# Patient Record
Sex: MALE | Race: WHITE | NOT HISPANIC OR LATINO | Employment: FULL TIME | URBAN - METROPOLITAN AREA
[De-identification: names, ages, dates, MRNs, and addresses within clinical notes are randomized per-mention and may not be internally consistent; named-entity substitution may affect disease eponyms.]

---

## 2017-02-03 RX ORDER — CARVEDILOL 6.25 MG/1
TABLET ORAL
Qty: 180 TABLET | Refills: 0 | Status: SHIPPED | OUTPATIENT
Start: 2017-02-03 | End: 2017-07-25 | Stop reason: SDUPTHER

## 2017-04-05 ENCOUNTER — TELEPHONE (OUTPATIENT)
Dept: SLEEP MEDICINE | Facility: HOSPITAL | Age: 72
End: 2017-04-05

## 2017-04-05 NOTE — TELEPHONE ENCOUNTER
"Returned pt's call. He states he opened a new mask Sunday (same make/model he has been using with no trouble), and he had \"red welts\" on his face Monday morning. He described the marks as red lines that extend from the nose down around the bottom of the face. He uses a full face mask, but the marks do not appear on bridge of nose. He said the skin feels tough where the redness is. He states he has never had this problem before. He did not wash the mask before using it. He states that he went to [facility] about it yesterday, and they suggested he wash the mask in mild soapy water, which he has not yet done. I suggested it would be a idea to wash it, and wash any new mask as well. He agreed to do the same tonight, but he asked that we get a message to Dr. Call to see how he advises pt proceed or if he thinks pt needs to be seen.  "

## 2017-04-10 ENCOUNTER — TELEPHONE (OUTPATIENT)
Dept: SLEEP MEDICINE | Facility: HOSPITAL | Age: 72
End: 2017-04-10

## 2017-04-10 NOTE — TELEPHONE ENCOUNTER
Left message regarding note added to epic by tamiko on 4/5/17.    Dr. Call agrees with and tamiko about cleaning msk.  Pt to f/u  With dr. Call if needed.

## 2017-04-19 ENCOUNTER — HOSPITAL ENCOUNTER (OUTPATIENT)
Dept: SLEEP MEDICINE | Facility: HOSPITAL | Age: 72
Discharge: HOME | End: 2017-04-19
Admitting: INTERNAL MEDICINE

## 2017-04-19 PROCEDURE — G0463 HOSPITAL OUTPT CLINIC VISIT: HCPCS

## 2017-04-19 PROCEDURE — 99213 OFFICE O/P EST LOW 20 MIN: CPT | Performed by: INTERNAL MEDICINE

## 2017-04-29 NOTE — PROGRESS NOTES
[facility] Note April 19, 2017      Patient Care Team:  Patricia Ferrara MD as PCP - General  Patricia Ferrara MD as PCP - Family Medicine    Chief Complaint:  PLACIDO     Interval History:   The patient was last seen by me in November 2016.  He obtained a new mask.  It was the same type that he normally gets.  However, he has had a skin reaction.  His nasolabial folds are thickened and somewhat hard and slightly painful.  They are slightly red.  He uses a fullface mask.  He goes to bed 11:30 PM and awakens at 8 AM.  He wakes up once or twice to urinate.  [place] Sleepiness Scale borderline normal at 7.    Review of Systems:  Recorded on the Sleep Questionnaire.  Unremarkable except for nasal congestion and anxiety.    Social History:  He does not smoke cigarettes.  6 alcoholic drinks per week.  3 cups of coffee a day.    Allergies:  Reviewed.     Medication Review:  Reviewed.      Vital Signs:  Height 67 inches and weight 172 he is overweight with a body mass index of 27.    Physical Exam:    Constitutional:  Well developed white male and appears in no apparent distress.  Awake & oriented times 3.  Normal mood with normal recent and remote memory and normal judgement.  Eyes:  Conjunctivae normal.  His nasolabial folds are somewhat thickened and firm.  Slightly tender.  Oropharynx:  moist mucous membranes without exudate and a normal sized tongue and previous UPPP and mild narrowing of the posterior pharyngeal opening and class II-III MP airway.      Results Review:  DME is [facility] and he uses a fullface mask.  Downloads between October 21, 2016 and April 18, 2017 compliances 90%.  There is a time greater than 2 weeks in which he used his travel Z1 CPAP.  Average usage is 8 hours.  Average AHI is normal without leak and his CPAP check is 12.5.       Impression:   Obstructive sleep apnea adequately treated with CPAP with good compliance and usage and no complaints of hypersomnolence.  He has  had a mild mask reaction?      Plan:  Good sleep hygiene measures should be maintained.  Some weight loss would be beneficial in this patient who is overweight by BMI.    The patient will continue his CPAP.  We did provide p.m. AP 10 medium interface to try for one or 2 weeks.  Further recommendations will be made after he calls us back.      Kaden Call MD  04/29/17  1:26 PM

## 2017-05-23 ENCOUNTER — LAB (OUTPATIENT)
Dept: INTERNAL MEDICINE | Facility: CLINIC | Age: 72
End: 2017-05-23

## 2017-05-23 DIAGNOSIS — E78.00 PURE HYPERCHOLESTEROLEMIA: ICD-10-CM

## 2017-05-23 DIAGNOSIS — R97.20 ELEVATED PSA: ICD-10-CM

## 2017-05-23 LAB
ALBUMIN SERPL-MCNC: 4.3 G/DL (ref 3.5–5.2)
ALBUMIN/GLOB SERPL: 1.9 G/DL
ALP SERPL-CCNC: 52 U/L (ref 39–117)
ALT SERPL-CCNC: 22 U/L (ref 1–41)
AST SERPL-CCNC: 20 U/L (ref 1–40)
BILIRUB SERPL-MCNC: 0.5 MG/DL (ref 0.1–1.2)
BUN SERPL-MCNC: 14 MG/DL (ref 8–23)
BUN/CREAT SERPL: 15.2 (ref 7–25)
CALCIUM SERPL-MCNC: 9.6 MG/DL (ref 8.6–10.5)
CHLORIDE SERPL-SCNC: 105 MMOL/L (ref 98–107)
CHOLEST SERPL-MCNC: 149 MG/DL (ref 0–200)
CO2 SERPL-SCNC: 27.1 MMOL/L (ref 22–29)
CREAT SERPL-MCNC: 0.92 MG/DL (ref 0.76–1.27)
GLOBULIN SER CALC-MCNC: 2.3 GM/DL
GLUCOSE SERPL-MCNC: 106 MG/DL (ref 65–99)
HDLC SERPL-MCNC: 54 MG/DL (ref 40–60)
LDLC SERPL CALC-MCNC: 70 MG/DL (ref 0–100)
POTASSIUM SERPL-SCNC: 4.3 MMOL/L (ref 3.5–5.2)
PROT SERPL-MCNC: 6.6 G/DL (ref 6–8.5)
PSA SERPL-MCNC: 3.73 NG/ML (ref 0–4)
SODIUM SERPL-SCNC: 144 MMOL/L (ref 136–145)
TRIGL SERPL-MCNC: 125 MG/DL (ref 0–150)
VLDLC SERPL-MCNC: 25 MG/DL (ref 5–40)

## 2017-06-07 ENCOUNTER — OFFICE VISIT (OUTPATIENT)
Dept: INTERNAL MEDICINE | Facility: CLINIC | Age: 72
End: 2017-06-07

## 2017-06-07 ENCOUNTER — TELEPHONE (OUTPATIENT)
Dept: INTERNAL MEDICINE | Facility: CLINIC | Age: 72
End: 2017-06-07

## 2017-06-07 VITALS
HEART RATE: 72 BPM | HEIGHT: 68 IN | BODY MASS INDEX: 26.95 KG/M2 | DIASTOLIC BLOOD PRESSURE: 60 MMHG | WEIGHT: 177.8 LBS | SYSTOLIC BLOOD PRESSURE: 122 MMHG

## 2017-06-07 DIAGNOSIS — E78.00 PURE HYPERCHOLESTEROLEMIA: Primary | ICD-10-CM

## 2017-06-07 DIAGNOSIS — M79.89 MASS OF SOFT TISSUE OF RIGHT UPPER EXTREMITY: ICD-10-CM

## 2017-06-07 DIAGNOSIS — R73.01 IMPAIRED FASTING BLOOD SUGAR: ICD-10-CM

## 2017-06-07 DIAGNOSIS — Z00.00 HEALTH CARE MAINTENANCE: ICD-10-CM

## 2017-06-07 DIAGNOSIS — R97.20 ELEVATED PSA: ICD-10-CM

## 2017-06-07 DIAGNOSIS — I10 BENIGN ESSENTIAL HYPERTENSION: ICD-10-CM

## 2017-06-07 PROCEDURE — 99214 OFFICE O/P EST MOD 30 MIN: CPT | Performed by: INTERNAL MEDICINE

## 2017-06-07 RX ORDER — ROSUVASTATIN CALCIUM 40 MG/1
40 TABLET, COATED ORAL DAILY
Qty: 90 TABLET | Refills: 3 | Status: SHIPPED | OUTPATIENT
Start: 2017-06-07 | End: 2018-07-06 | Stop reason: SDUPTHER

## 2017-06-07 NOTE — TELEPHONE ENCOUNTER
----- Message from Katlin Tejada MA sent at 6/7/2017 10:25 AM EDT -----  Pt calling to inform you that has has been taking Carvedilol BID but MAR says QD.  Pt wants to know if he should change and take only once daily    pls advise        Pt#[phone]

## 2017-06-07 NOTE — PATIENT INSTRUCTIONS
"HTN - well controlled with current medication regimen. Normal kidney tests and electrolytes. Recommended low salt diet. Continue same medical treatment.  Hyperlipidemia - well controlled with statin. Normal liver function tests. LDL target is below < 70 mg/dl (\"very high\" risk for CHD). Patient's 71. Continue same treatment. Regular exercise recommended.  Elevated PSA - improved. Will monitor.  Impaired fasting blood sugar - still elevated at 106. Needs to continue low starch, low carb diet and regular exercise.  Callus-like soft tissue tumor on the right thumb - patient had it excised twice, but had reoccured every time. Now it is to the point that it effects his bowling and recreation. Will refer for 2nd opinion to .    "

## 2017-06-07 NOTE — PROGRESS NOTES
"Darren Zheng is a 71 y.o. male. Here for HTN, hyperlipidemia and elevated PSA f/u.    History of Present Illness     /60  Pulse 72  Ht 68\" (172.7 cm)  Wt 177 lb 12.8 oz (80.6 kg)  BMI 27.03 kg/m2    Current Outpatient Prescriptions:   •  aspirin (ASPIR) 81 MG EC tablet, Take  by mouth., Disp: , Rfl:   •  carvedilol (COREG) 6.25 MG tablet, TAKE 1 TABLET BY MOUTH EVERY DAY, Disp: 180 tablet, Rfl: 0  •  carvedilol (COREG) 6.25 MG tablet, TAKE 1 TABLET BY MOUTH EVERY DAY, Disp: 180 tablet, Rfl: 0  •  Cetirizine HCl (ZYRTEC ALLERGY) 10 MG capsule, Take  by mouth., Disp: , Rfl:   •  Cholecalciferol (VITAMIN D3) 1000 UNIT/SPRAY liquid, Take  by mouth., Disp: , Rfl:   •  Cyanocobalamin (VITAMIN B 12) 100 MCG lozenge, Take  by mouth., Disp: , Rfl:   •  FLUZONE HIGH-DOSE 0.5 ML suspension prefilled syringe injection, , Disp: , Rfl:   •  Multiple Vitamin (MULTI VITAMIN DAILY) tablet, Take  by mouth., Disp: , Rfl:   •  Omega-3 Fatty Acids (FISH OIL DOUBLE STRENGTH) 1200 MG capsule, Take  by mouth., Disp: , Rfl:   •  rosuvastatin (CRESTOR) 40 MG tablet, TAKE 1 TABLET BY MOUTH DAILY, Disp: 90 tablet, Rfl: 0    Patient has long-standing benign essential HTN.  BP is usually well controlled with daily use of b-blocker. On low salt diet with good compliance. Takes medication regularly. Denies chest pain, dyspnea,lightheadedness,  lower extremity edema. Patient does not check blood pressure    Patient has been diagnosed with hyperlipidemia. Target LDL is < 70 mg/dl (\"very high\" risk for CHD). Patient is on low fat diet with good compliance. Patient is compliant with treatment: daily use of Crestor (rozuvastatin). Side effects of medication: none. Exercise 5-6 days a week and walking.    Patient had been diagnosed with elevated PSA. No FH of prostate cancer.     Patient c/o soft tissue growth on the PIP joint of the right thumb, that had appeared some time ago. He had excision by  twice, and every " time came back. No pain, but had affected his bowling. The area is a subject to the pressure while bowling, that he does once a week. Patient states that he has discomfort an desired second opinion.    The following portions of the patient's history were reviewed and updated as appropriate: allergies, current medications, past family history, past medical history, past social history, past surgical history and problem list.    Review of Systems   Constitutional: Negative.    HENT: Negative.    Eyes: Negative.    Respiratory: Negative.    Cardiovascular: Negative.    Gastrointestinal: Negative.    Endocrine: Negative.    Genitourinary: Negative.    Musculoskeletal: Negative.    Skin: Negative.    Allergic/Immunologic: Negative.    Neurological: Negative.    Hematological: Negative.    Psychiatric/Behavioral: Negative.        Objective   Physical Exam   Constitutional: He is oriented to person, place, and time. He appears well-developed and well-nourished.   HENT:   Head: Normocephalic and atraumatic.   Right Ear: Tympanic membrane, external ear and ear canal normal.   Left Ear: Tympanic membrane, external ear and ear canal normal.   Nose: Nose normal. Right sinus exhibits no maxillary sinus tenderness and no frontal sinus tenderness. Left sinus exhibits no maxillary sinus tenderness and no frontal sinus tenderness.   Mouth/Throat: Uvula is midline, oropharynx is clear and moist and mucous membranes are normal.   Eyes: Conjunctivae and EOM are normal. Pupils are equal, round, and reactive to light. Right eye exhibits no discharge. Left eye exhibits no discharge. No scleral icterus.   Neck: Neck supple. No JVD present.   Cardiovascular: Normal rate, regular rhythm and normal heart sounds.  Exam reveals no gallop and no friction rub.    No murmur heard.  Pulmonary/Chest: Effort normal and breath sounds normal. He has no wheezes. He has no rales.   Musculoskeletal: He exhibits deformity (callus like soft tissue mass over  "the PIP right thumb). He exhibits no edema.   Lymphadenopathy:     He has no cervical adenopathy.   Neurological: He is alert and oriented to person, place, and time. No cranial nerve deficit.   Skin: Skin is warm and dry. No rash noted.   Psychiatric: He has a normal mood and affect. His behavior is normal.   Vitals reviewed.      Assessment/Plan   Chuck was seen today for follow-up, hypertension and hyperlipidemia.    Diagnoses and all orders for this visit:    Pure hypercholesterolemia    Elevated PSA    Benign essential hypertension    Impaired fasting blood sugar    Mass of soft tissue of right upper extremity  -     Ambulatory Referral to Hand Surgery    [facility] maintenance  -     CBC & Differential; Future  -     Comprehensive Metabolic Panel; Future  -     Lipid Panel; Future  -     PSA; Future  -     TSH; Future  -     Urinalysis With / Microscopic If Indicated; Future      HTN - well controlled with current medication regimen. Normal kidney tests and electrolytes. Recommended low salt diet. Continue same medical treatment.  Hyperlipidemia - well controlled with statin. Normal liver function tests. LDL target is below < 70 mg/dl (\"very high\" risk for CHD). Patient's 71. Continue same treatment. Regular exercise recommended.  Elevated PSA - improved. Will monitor.  Impaired fasting blood sugar - still elevated at 106. Needs to continue low starch, low carb diet and regular exercise.  Callus-like soft tissue tumor on the right thumb - patient had it excised twice, but had reoccured every time. Now it is to the point that it effects his bowling and recreation. Will refer for 2nd opinion to .     "

## 2017-06-19 ENCOUNTER — TELEPHONE (OUTPATIENT)
Dept: INTERNAL MEDICINE | Facility: CLINIC | Age: 72
End: 2017-06-19

## 2017-06-19 RX ORDER — AZITHROMYCIN 250 MG/1
TABLET, FILM COATED ORAL
Qty: 6 TABLET | Refills: 0 | Status: SHIPPED | OUTPATIENT
Start: 2017-06-19 | End: 2017-07-03

## 2017-06-19 NOTE — TELEPHONE ENCOUNTER
"----- Message from Leeanne AMY Bower sent at 6/19/2017 10:09 AM EDT -----  Contact: Patient  Patient called with complaints of sinus infection x1 month.  States he does a nasal irrigation every morning with discolored \"chunks\" coming from nose.  Feels warm but does not believe he is running a fever.  Occasional nonproductive cough.  Would like a Z-Romie please.  Please advise.     Patient:  [phone]    Pharmacy:  [facility] [address] [place] [address] [address] AT [facility] - [phone]  - [phone]  "

## 2017-06-21 ENCOUNTER — TRANSCRIBE ORDERS (OUTPATIENT)
Dept: ADMINISTRATIVE | Facility: HOSPITAL | Age: 72
End: 2017-06-21

## 2017-06-21 ENCOUNTER — HOSPITAL ENCOUNTER (OUTPATIENT)
Dept: CARDIOLOGY | Facility: HOSPITAL | Age: 72
Discharge: HOME | End: 2017-06-21
Attending: PLASTIC SURGERY | Admitting: PLASTIC SURGERY

## 2017-06-21 DIAGNOSIS — Z01.818 PRE-OP TESTING: Primary | ICD-10-CM

## 2017-06-21 DIAGNOSIS — M67.449 GANGLION OF HAND, UNSPECIFIED LATERALITY: ICD-10-CM

## 2017-06-21 PROCEDURE — 93010 ELECTROCARDIOGRAM REPORT: CPT | Performed by: INTERNAL MEDICINE

## 2017-06-21 PROCEDURE — 93005 ELECTROCARDIOGRAM TRACING: CPT

## 2017-07-03 ENCOUNTER — OFFICE VISIT (OUTPATIENT)
Dept: INTERNAL MEDICINE | Facility: CLINIC | Age: 72
End: 2017-07-03

## 2017-07-03 VITALS
SYSTOLIC BLOOD PRESSURE: 104 MMHG | DIASTOLIC BLOOD PRESSURE: 60 MMHG | HEIGHT: 68 IN | WEIGHT: 178 LBS | BODY MASS INDEX: 26.98 KG/M2

## 2017-07-03 DIAGNOSIS — R53.83 FATIGUE, UNSPECIFIED TYPE: ICD-10-CM

## 2017-07-03 DIAGNOSIS — R73.01 IMPAIRED FASTING BLOOD SUGAR: Primary | ICD-10-CM

## 2017-07-03 PROBLEM — M79.89 MASS OF SOFT TISSUE OF RIGHT UPPER EXTREMITY: Status: RESOLVED | Noted: 2017-06-07 | Resolved: 2017-07-03

## 2017-07-03 LAB
ALBUMIN SERPL-MCNC: 4.5 G/DL (ref 3.5–5.2)
ALBUMIN/GLOB SERPL: 2 G/DL
ALP SERPL-CCNC: 59 U/L (ref 39–117)
ALT SERPL-CCNC: 22 U/L (ref 1–41)
AST SERPL-CCNC: 24 U/L (ref 1–40)
BILIRUB SERPL-MCNC: 0.5 MG/DL (ref 0.1–1.2)
BUN SERPL-MCNC: 13 MG/DL (ref 8–23)
BUN/CREAT SERPL: 12.3 (ref 7–25)
CALCIUM SERPL-MCNC: 9.3 MG/DL (ref 8.6–10.5)
CHLORIDE SERPL-SCNC: 104 MMOL/L (ref 98–107)
CO2 SERPL-SCNC: 27.2 MMOL/L (ref 22–29)
CREAT SERPL-MCNC: 1.06 MG/DL (ref 0.76–1.27)
ERYTHROCYTE [SEDIMENTATION RATE] IN BLOOD BY WESTERGREN METHOD: 4 MM/HR (ref 0–20)
GLOBULIN SER CALC-MCNC: 2.2 GM/DL
GLUCOSE SERPL-MCNC: 76 MG/DL (ref 65–99)
POTASSIUM SERPL-SCNC: 4.5 MMOL/L (ref 3.5–5.2)
PROT SERPL-MCNC: 6.7 G/DL (ref 6–8.5)
SODIUM SERPL-SCNC: 145 MMOL/L (ref 136–145)

## 2017-07-03 PROCEDURE — 99213 OFFICE O/P EST LOW 20 MIN: CPT | Performed by: INTERNAL MEDICINE

## 2017-07-03 NOTE — PATIENT INSTRUCTIONS
Fatigue, unusual degree - at least 3 of the medications could contribute: Zyrtec, that he takes in am, Coreg and Crestor.BP seems also run on a low side. Will ask patient to stop Coreg and Crestor. Change Zyrtec to Allegra or Claritin. In 190 days restart Coreg, and in a week or ao will reevaluate, if no change, will restart Crestor. Will check labs as per orders. If all negative - possibly due to increased load at work and excessive burn-out at work.

## 2017-07-03 NOTE — PROGRESS NOTES
"Subjective   Chuck Zheng is a 71 y.o. male. Here c/o not feeling well and being fatigued    History of Present Illness   /60  Ht 68\" (172.7 cm)  Wt 178 lb (80.7 kg)  BMI 27.06 kg/m2  Chuck Zheng71 y.o.male presents to the office c/o being very tired and fatigued, stated going to bed earlier, believes that it had affected his concentration at work and in social situations, like ability to stay in the conversation. Continues to play bridge and to win, but feel tiered all the time.  S-ms started few months ago. Precipitating event: possible inc reased work load , as he had to  somebody's load, as one of the coworkers is on extended leave. Patient describes just overwhelming fatigue. Intensity of the s-ms: severe. Patient denies  fevers, chills, chest pressure or pains. Had few episodes of heart racing, that lasted for few seconds only and resolved spontaneously. Denies dysponea. States that the fatigue had been constant. Lost 2 lbs since 11/2016. Has a cat at home, takes care of the litter box.  Patient  has had no similar episode in a past.   Treatments tried:none  PMH is significant for CAD and PLACIDO. Takes Zyrtec in am (usully he rotates antihistamines). Takes Coreg and Crestor.      Current Outpatient Prescriptions:   •  aspirin (ASPIR) 81 MG EC tablet, Take  by mouth., Disp: , Rfl:   •  carvedilol (COREG) 6.25 MG tablet, TAKE 1 TABLET BY MOUTH EVERY DAY, Disp: 180 tablet, Rfl: 0  •  Cetirizine HCl (ZYRTEC ALLERGY) 10 MG capsule, Take  by mouth., Disp: , Rfl:   •  Cholecalciferol (VITAMIN D3) 1000 UNIT/SPRAY liquid, Take  by mouth., Disp: , Rfl:   •  Cyanocobalamin (VITAMIN B 12) 100 MCG lozenge, Take  by mouth., Disp: , Rfl:   •  FLUZONE HIGH-DOSE 0.5 ML suspension prefilled syringe injection, , Disp: , Rfl:   •  Multiple Vitamin (MULTI VITAMIN DAILY) tablet, Take  by mouth., Disp: , Rfl:   •  Omega-3 Fatty Acids (FISH OIL DOUBLE STRENGTH) 1200 MG capsule, Take  by mouth., Disp: , Rfl: "   •  rosuvastatin (CRESTOR) 40 MG tablet, Take 1 tablet by mouth Daily., Disp: 90 tablet, Rfl: 3    The following portions of the patient's history were reviewed and updated as appropriate: allergies, current medications, past family history, past medical history, past social history, past surgical history and problem list.    Review of Systems   Constitutional: Positive for fatigue (becoming more and more tired. ). Negative for activity change, appetite change, chills, fever and unexpected weight change.   HENT: Negative for congestion, postnasal drip and sinus pressure.    Eyes: Negative for pain and itching.   Respiratory: Negative for cough and chest tightness.    Cardiovascular: Positive for palpitations (had few episodes that lasted 20-30 sec). Negative for chest pain and leg swelling.   Gastrointestinal: Negative for abdominal pain, anal bleeding, blood in stool and nausea.   Endocrine: Negative.    Genitourinary: Negative.    Musculoskeletal: Negative for arthralgias and myalgias.   Neurological: Negative for dizziness, light-headedness, numbness and headaches.   Psychiatric/Behavioral: Negative.        Objective   Physical Exam   Constitutional: He is oriented to person, place, and time. He appears well-developed and well-nourished.   HENT:   Head: Normocephalic and atraumatic.   Right Ear: Tympanic membrane, external ear and ear canal normal.   Left Ear: Tympanic membrane, external ear and ear canal normal.   Nose: Nose normal. Right sinus exhibits no maxillary sinus tenderness and no frontal sinus tenderness. Left sinus exhibits no maxillary sinus tenderness and no frontal sinus tenderness.   Mouth/Throat: Uvula is midline, oropharynx is clear and moist and mucous membranes are normal.   Eyes: Conjunctivae and EOM are normal. Pupils are equal, round, and reactive to light. Right eye exhibits no discharge. Left eye exhibits no discharge. No scleral icterus.   Neck: Neck supple. No JVD present.    Cardiovascular: Normal rate, regular rhythm and normal heart sounds.  Exam reveals no gallop and no friction rub.    No murmur heard.  Pulmonary/Chest: Effort normal and breath sounds normal. He has no wheezes. He has no rales.   Musculoskeletal: He exhibits no edema.   Lymphadenopathy:     He has no cervical adenopathy.   Neurological: He is alert and oriented to person, place, and time. No cranial nerve deficit.   Skin: Skin is warm and dry. No rash noted.   Psychiatric: He has a normal mood and affect. His behavior is normal.   Vitals reviewed.      Assessment/Plan   Diagnoses and all orders for this visit:    Impaired fasting blood sugar  -     Hemoglobin A1c; Future    Fatigue, unspecified type  -     Toxoplasma Antibodies IgG / IgM; Future  -     CBC & Differential; Future  -     Comprehensive Metabolic Panel; Future  -     Sedimentation Rate; Future      Fatigue, unusual degree - at least 3 of the medications could contribute: Zyrtec, that he takes in am, Coreg and Crestor.BP seems also run on a low side. Will ask patient to stop Coreg and Crestor. Change Zyrtec to Allegra or Claritin. In 190 days restart Coreg, and in a week or ao will reevaluate, if no change, will restart Crestor. Will check labs as per orders. If all negative - possibly due to increased load at work and excessive burn-out at work.

## 2017-07-04 LAB
BASOPHILS # BLD AUTO: 0.03 10*3/MM3 (ref 0–0.2)
BASOPHILS NFR BLD AUTO: 0.6 % (ref 0–1.5)
EOSINOPHIL # BLD AUTO: 0.07 10*3/MM3 (ref 0–0.7)
EOSINOPHIL # BLD AUTO: 1.5 % (ref 0.3–6.2)
ERYTHROCYTE [DISTWIDTH] IN BLOOD BY AUTOMATED COUNT: 13.8 % (ref 11.5–14.5)
HBA1C MFR BLD: 5.54 % (ref 4.8–5.6)
HCT VFR BLD AUTO: 41.6 % (ref 40.4–52.2)
HGB BLD-MCNC: 13.8 G/DL (ref 13.7–17.6)
IMM GRANULOCYTES # BLD: 0 10*3/MM3 (ref 0–0.03)
IMM GRANULOCYTES NFR BLD: 0 % (ref 0–0.5)
LABORATORY COMMENT REPORT: NORMAL
LYMPHOCYTES # BLD AUTO: 1.48 10*3/MM3 (ref 0.9–4.8)
LYMPHOCYTES NFR BLD AUTO: 31.7 % (ref 19.6–45.3)
MCH RBC QN AUTO: 28.8 PG (ref 27–32.7)
MCHC RBC AUTO-ENTMCNC: 33.2 G/DL (ref 32.6–36.4)
MCV RBC AUTO: 86.7 FL (ref 79.8–96.2)
MONOCYTES # BLD AUTO: 0.33 10*3/MM3 (ref 0.2–1.2)
MONOCYTES NFR BLD AUTO: 7.1 % (ref 5–12)
NEUTROPHILS # BLD AUTO: 2.76 10*3/MM3 (ref 1.9–8.1)
NEUTROPHILS NFR BLD AUTO: 59.1 % (ref 42.7–76)
PLATELET # BLD AUTO: 193 10*3/MM3 (ref 140–500)
RBC # BLD AUTO: 4.8 10*6/MM3 (ref 4.6–6)
T GONDII IGG SERPL IA-ACNC: <3 IU/ML (ref 0–7.1)
T GONDII IGM SER IA-ACNC: <3 AU/ML (ref 0–7.9)
WBC # BLD AUTO: 4.67 10*3/MM3 (ref 4.5–10.7)

## 2017-07-25 ENCOUNTER — OFFICE VISIT (OUTPATIENT)
Dept: INTERNAL MEDICINE | Facility: CLINIC | Age: 72
End: 2017-07-25

## 2017-07-25 VITALS
SYSTOLIC BLOOD PRESSURE: 114 MMHG | WEIGHT: 177 LBS | HEIGHT: 68 IN | RESPIRATION RATE: 14 BRPM | BODY MASS INDEX: 26.83 KG/M2 | DIASTOLIC BLOOD PRESSURE: 60 MMHG

## 2017-07-25 DIAGNOSIS — I25.10 ATHEROSCLEROSIS OF NATIVE CORONARY ARTERY OF NATIVE HEART WITHOUT ANGINA PECTORIS: Primary | ICD-10-CM

## 2017-07-25 PROCEDURE — 99213 OFFICE O/P EST LOW 20 MIN: CPT | Performed by: INTERNAL MEDICINE

## 2017-07-25 RX ORDER — CARVEDILOL 3.12 MG/1
1.5 TABLET ORAL 2 TIMES DAILY WITH MEALS
Qty: 30 TABLET | Refills: 11 | Status: SHIPPED | OUTPATIENT
Start: 2017-07-25 | End: 2017-12-12 | Stop reason: SDUPTHER

## 2017-07-25 NOTE — PATIENT INSTRUCTIONS
Fatigue - due to b-blocker. I had explained to the patient why he had been started on Carvedilol and what are the benefits. Will try to use very low dose: change to 1.56 mg twice a day. If not able to tolerate, call us back. If doing well, restart Crestor in 2 weeks.  CAD - restart lower dose of Carvedilol as above. If not able to tolerate, might consider use of Zebeta.

## 2017-07-25 NOTE — PROGRESS NOTES
Subjective   Chuck Zheng is a 71 y.o. male.     History of Present Illness   Chuck Zheng 71 y.o. male presents today for fatigue f/u. Last was seen on 07/03/2017 and on that visit medication was changed due to side effect  - fatigue.We had discontinued Carvedilol and Crestor..  Patient is compliant with treatment. Patient reports resolution of the symptoms. Fatigue is definitely related to the use of Carvedilol, had resolved immediately as he had stopped medication, and restarted as he is back on Carvedilol.Patient had been on Carvedilol for CAD - had stent to RCA. Denies chest pains, dyspnea.    The following portions of the patient's history were reviewed and updated as appropriate: allergies, current medications, past family history, past medical history, past social history, past surgical history and problem list.    Review of Systems   Constitutional: Negative for chills and fever.   Eyes: Negative for pain and redness.   Respiratory: Negative for cough and shortness of breath.    Cardiovascular: Negative for chest pain and leg swelling.   Neurological: Negative for dizziness and headaches.       Objective   Physical Exam   Constitutional: He is oriented to person, place, and time. He appears well-developed and well-nourished.   HENT:   Head: Normocephalic and atraumatic.   Right Ear: Tympanic membrane, external ear and ear canal normal.   Left Ear: Tympanic membrane, external ear and ear canal normal.   Nose: Nose normal. Right sinus exhibits no maxillary sinus tenderness and no frontal sinus tenderness. Left sinus exhibits no maxillary sinus tenderness and no frontal sinus tenderness.   Mouth/Throat: Uvula is midline, oropharynx is clear and moist and mucous membranes are normal.   Eyes: Conjunctivae and EOM are normal. Pupils are equal, round, and reactive to light. Right eye exhibits no discharge. Left eye exhibits no discharge. No scleral icterus.   Neck: Neck supple. No JVD present.    Cardiovascular: Normal rate, regular rhythm and normal heart sounds.  Exam reveals no gallop and no friction rub.    No murmur heard.  Pulmonary/Chest: Effort normal and breath sounds normal. He has no wheezes. He has no rales.   Musculoskeletal: He exhibits no edema.   Lymphadenopathy:     He has no cervical adenopathy.   Neurological: He is alert and oriented to person, place, and time. No cranial nerve deficit.   Skin: Skin is warm and dry. No rash noted.   Psychiatric: He has a normal mood and affect. His behavior is normal.   Vitals reviewed.      Assessment/Plan   Chuck was seen today for fatigue.    Diagnoses and all orders for this visit:    Atherosclerosis of native coronary artery of native heart without angina pectoris  -     carvedilol (COREG) 3.125 MG tablet; Take 0.5 tablets by mouth 2 (Two) Times a Day With Meals.      Fatigue - due to b-blocker. I had explained to the patient why he had been started on Carvedilol and what are the benefits. Will try to use very low dose: change to 1.56 mg twice a day. If not able to tolerate, call us back. If doing well, restart Crestor in 2 weeks.  CAD - restart lower dose of Carvedilol as above. If not able to tolerate, might consider use of Zebeta.

## 2017-08-01 ENCOUNTER — LAB REQUISITION (OUTPATIENT)
Dept: LAB | Facility: HOSPITAL | Age: 72
End: 2017-08-01

## 2017-08-01 DIAGNOSIS — M67.449 GANGLION OF HAND: ICD-10-CM

## 2017-08-01 PROCEDURE — 88305 TISSUE EXAM BY PATHOLOGIST: CPT | Performed by: PLASTIC SURGERY

## 2017-08-02 LAB
CYTO UR: NORMAL
LAB AP CASE REPORT: NORMAL
LAB AP CLINICAL INFORMATION: NORMAL
Lab: NORMAL
PATH REPORT.FINAL DX SPEC: NORMAL
PATH REPORT.GROSS SPEC: NORMAL

## 2017-09-11 ENCOUNTER — HOSPITAL ENCOUNTER (EMERGENCY)
Facility: HOSPITAL | Age: 72
Discharge: HOME | End: 2017-09-11
Attending: EMERGENCY MEDICINE | Admitting: EMERGENCY MEDICINE

## 2017-09-11 ENCOUNTER — APPOINTMENT (OUTPATIENT)
Dept: GENERAL RADIOLOGY | Facility: HOSPITAL | Age: 72
End: 2017-09-11

## 2017-09-11 VITALS
BODY MASS INDEX: 26.52 KG/M2 | SYSTOLIC BLOOD PRESSURE: 179 MMHG | DIASTOLIC BLOOD PRESSURE: 87 MMHG | TEMPERATURE: 97.8 F | HEIGHT: 68 IN | RESPIRATION RATE: 18 BRPM | WEIGHT: 175 LBS | HEART RATE: 70 BPM | OXYGEN SATURATION: 98 %

## 2017-09-11 DIAGNOSIS — R42 EPISODIC LIGHTHEADEDNESS: Primary | ICD-10-CM

## 2017-09-11 LAB
ALBUMIN SERPL-MCNC: 4.3 G/DL (ref 3.5–5.2)
ALBUMIN/GLOB SERPL: 1.5 G/DL
ALP SERPL-CCNC: 58 U/L (ref 39–117)
ALT SERPL W P-5'-P-CCNC: 24 U/L (ref 1–41)
ANION GAP SERPL CALCULATED.3IONS-SCNC: 9.6 MMOL/L
AST SERPL-CCNC: 19 U/L (ref 1–40)
BASOPHILS # BLD AUTO: 0.02 10*3/MM3 (ref 0–0.2)
BASOPHILS NFR BLD AUTO: 0.3 % (ref 0–1.5)
BILIRUB SERPL-MCNC: 0.3 MG/DL (ref 0.1–1.2)
BILIRUB UR QL STRIP: NEGATIVE
BUN BLD-MCNC: 15 MG/DL (ref 8–23)
BUN/CREAT SERPL: 15.2 (ref 7–25)
CALCIUM SPEC-SCNC: 9.3 MG/DL (ref 8.6–10.5)
CHLORIDE SERPL-SCNC: 102 MMOL/L (ref 98–107)
CLARITY UR: CLEAR
CO2 SERPL-SCNC: 28.4 MMOL/L (ref 22–29)
COLOR UR: YELLOW
CREAT BLD-MCNC: 0.99 MG/DL (ref 0.76–1.27)
DEPRECATED RDW RBC AUTO: 46.8 FL (ref 37–54)
EOSINOPHIL # BLD AUTO: 0.08 10*3/MM3 (ref 0–0.7)
EOSINOPHIL NFR BLD AUTO: 1.1 % (ref 0.3–6.2)
ERYTHROCYTE [DISTWIDTH] IN BLOOD BY AUTOMATED COUNT: 14.3 % (ref 11.5–14.5)
GFR SERPL CREATININE-BSD FRML MDRD: 75 ML/MIN/1.73
GLOBULIN UR ELPH-MCNC: 2.9 GM/DL
GLUCOSE BLD-MCNC: 106 MG/DL (ref 65–99)
GLUCOSE UR STRIP-MCNC: NEGATIVE MG/DL
HCT VFR BLD AUTO: 41.7 % (ref 40.4–52.2)
HGB BLD-MCNC: 13.4 G/DL (ref 13.7–17.6)
HGB UR QL STRIP.AUTO: NEGATIVE
HOLD SPECIMEN: NORMAL
HOLD SPECIMEN: NORMAL
IMM GRANULOCYTES # BLD: 0.02 10*3/MM3 (ref 0–0.03)
IMM GRANULOCYTES NFR BLD: 0.3 % (ref 0–0.5)
KETONES UR QL STRIP: NEGATIVE
LEUKOCYTE ESTERASE UR QL STRIP.AUTO: NEGATIVE
LYMPHOCYTES # BLD AUTO: 1.18 10*3/MM3 (ref 0.9–4.8)
LYMPHOCYTES NFR BLD AUTO: 15.7 % (ref 19.6–45.3)
MAGNESIUM SERPL-MCNC: 2.4 MG/DL (ref 1.6–2.4)
MCH RBC QN AUTO: 28.9 PG (ref 27–32.7)
MCHC RBC AUTO-ENTMCNC: 32.1 G/DL (ref 32.6–36.4)
MCV RBC AUTO: 89.9 FL (ref 79.8–96.2)
MONOCYTES # BLD AUTO: 0.48 10*3/MM3 (ref 0.2–1.2)
MONOCYTES NFR BLD AUTO: 6.4 % (ref 5–12)
NEUTROPHILS # BLD AUTO: 5.74 10*3/MM3 (ref 1.9–8.1)
NEUTROPHILS NFR BLD AUTO: 76.2 % (ref 42.7–76)
NITRITE UR QL STRIP: NEGATIVE
PH UR STRIP.AUTO: 5.5 [PH] (ref 5–8)
PLATELET # BLD AUTO: 217 10*3/MM3 (ref 140–500)
PMV BLD AUTO: 10.2 FL (ref 6–12)
POTASSIUM BLD-SCNC: 4.1 MMOL/L (ref 3.5–5.2)
PROT SERPL-MCNC: 7.2 G/DL (ref 6–8.5)
PROT UR QL STRIP: NEGATIVE
RBC # BLD AUTO: 4.64 10*6/MM3 (ref 4.6–6)
SODIUM BLD-SCNC: 140 MMOL/L (ref 136–145)
SP GR UR STRIP: 1.01 (ref 1–1.03)
TROPONIN T SERPL-MCNC: <0.01 NG/ML (ref 0–0.03)
UROBILINOGEN UR QL STRIP: NORMAL
WBC NRBC COR # BLD: 7.52 10*3/MM3 (ref 4.5–10.7)
WHOLE BLOOD HOLD SPECIMEN: NORMAL
WHOLE BLOOD HOLD SPECIMEN: NORMAL

## 2017-09-11 PROCEDURE — 85025 COMPLETE CBC W/AUTO DIFF WBC: CPT | Performed by: EMERGENCY MEDICINE

## 2017-09-11 PROCEDURE — 93005 ELECTROCARDIOGRAM TRACING: CPT | Performed by: EMERGENCY MEDICINE

## 2017-09-11 PROCEDURE — 83735 ASSAY OF MAGNESIUM: CPT | Performed by: EMERGENCY MEDICINE

## 2017-09-11 PROCEDURE — 84484 ASSAY OF TROPONIN QUANT: CPT | Performed by: EMERGENCY MEDICINE

## 2017-09-11 PROCEDURE — 81003 URINALYSIS AUTO W/O SCOPE: CPT | Performed by: EMERGENCY MEDICINE

## 2017-09-11 PROCEDURE — 80053 COMPREHEN METABOLIC PANEL: CPT | Performed by: EMERGENCY MEDICINE

## 2017-09-11 PROCEDURE — 93010 ELECTROCARDIOGRAM REPORT: CPT | Performed by: INTERNAL MEDICINE

## 2017-09-11 PROCEDURE — 71020 HC CHEST PA AND LATERAL: CPT

## 2017-09-11 PROCEDURE — 36415 COLL VENOUS BLD VENIPUNCTURE: CPT | Performed by: EMERGENCY MEDICINE

## 2017-09-11 PROCEDURE — 99284 EMERGENCY DEPT VISIT MOD MDM: CPT

## 2017-09-11 RX ORDER — SODIUM CHLORIDE 0.9 % (FLUSH) 0.9 %
10 SYRINGE (ML) INJECTION AS NEEDED
Status: DISCONTINUED | OUTPATIENT
Start: 2017-09-11 | End: 2017-09-11 | Stop reason: HOSPADM

## 2017-09-12 NOTE — ED PROVIDER NOTES
" EMERGENCY DEPARTMENT ENCOUNTER    CHIEF COMPLAINT  Chief Complaint: dizziness  History given by: pt  History limited by: nothing  Room Number: 17/17  PMD: Maura Ferrara MD      HPI:  Pt is a 71 y.o. male who presents complaining of episodic dizziness that first began when walking out of work 6 hours ago. The first episode lasted or one minute. He reports that the dizziness is more light-headed than spinning. Pt admits to nausea and fatigue but denies headache, diaphoresis, palpitations, and chest pain.    Duration:  6 hours  Onset: sudden  Timing: episodic  Location: head  Radiation: none  Quality: \"dizzy\"  Intensity/Severity: moderate  Progression: resolved  Associated Symptoms: nausea, fatigue  Aggravating Factors: unknown  Alleviating Factors: unknown  Previous Episodes: no  Treatment before arrival: unknown    PAST MEDICAL HISTORY  Active Ambulatory Problems     Diagnosis Date Noted   • Atopic rhinitis 05/16/2016   • Benign essential hypertension 05/16/2016   • Atherosclerosis of coronary artery 05/16/2016   • Impaired fasting blood sugar 05/16/2016   • Hyperlipidemia 05/16/2016   • Cobalamin deficiency 05/16/2016   • Primary insomnia 05/16/2016   • Health care maintenance 11/17/2016   • Elevated PSA 11/21/2016   • Benign non-nodular prostatic hyperplasia 11/21/2016   • PLACIDO on CPAP Check Pressure +11.5 11/27/2016   • Fatigue 07/03/2017     Resolved Ambulatory Problems     Diagnosis Date Noted   • Candidal intertrigo 05/16/2016   • Mass of soft tissue of right upper extremity 06/07/2017     Past Medical History:   Diagnosis Date   • Adhesive capsulitis of shoulder    • Benign prostatic hypertrophy    • H/O cardiovascular stress test    • Hemorrhoid    • Hyperlipidemia    • Myocardial infarction    • Nephrolithiasis    • Obstructive sleep apnea    • Rupture of flexor tendon of hand    • Syncope        PAST SURGICAL HISTORY  Past Surgical History:   Procedure Laterality Date   • CHOLECYSTECTOMY     • COLONOSCOPY "      06/2003, nl, , 02/2014 , nl, needs C-scope in 2024   • CORONARY STENT PLACEMENT      5/2007 distal RCA   • TONSILLECTOMY         FAMILY HISTORY  Family History   Problem Relation Age of Onset   • Dementia Mother    • Glaucoma Mother    • Heart disease Father    • Colon polyps Father        SOCIAL HISTORY  Social History     Social History   • Marital status:      Spouse name: N/A   • Number of children: N/A   • Years of education: N/A     Occupational History   • Not on file.     Social History Main Topics   • Smoking status: Never Smoker   • Smokeless tobacco: Not on file   • Alcohol use Yes      Comment: 3x weekly   • Drug use: Not on file   • Sexual activity: Not on file     Other Topics Concern   • Not on file     Social History Narrative   • No narrative on file       ALLERGIES  Ace inhibitors; Beta adrenergic blockers; Cephalexin; and Sulfa antibiotics    REVIEW OF SYSTEMS  Review of Systems   Constitutional: Positive for fatigue. Negative for activity change, appetite change and fever.   HENT: Negative for congestion and sore throat.    Eyes: Negative.    Respiratory: Negative for cough and shortness of breath.    Cardiovascular: Negative for chest pain and leg swelling.   Gastrointestinal: Positive for nausea. Negative for abdominal pain, diarrhea and vomiting.   Endocrine: Negative.    Genitourinary: Negative for decreased urine volume and dysuria.   Musculoskeletal: Negative for neck pain.   Skin: Negative for rash and wound.   Allergic/Immunologic: Negative.    Neurological: Positive for dizziness. Negative for weakness, numbness and headaches.   Hematological: Negative.    Psychiatric/Behavioral: Negative.    All other systems reviewed and are negative.      PHYSICAL EXAM  ED Triage Vitals   Temp Heart Rate Resp BP SpO2   09/11/17 1720 09/11/17 1720 09/11/17 1720 09/11/17 1720 09/11/17 1720   98.6 °F (37 °C) 60 16 174/100 99 %      Temp src Heart Rate Source Patient Position  BP Location FiO2 (%)   -- -- -- -- --              Physical Exam   Constitutional: He is oriented to person, place, and time and well-developed, well-nourished, and in no distress.   HENT:   Head: Normocephalic and atraumatic.   Eyes: EOM are normal. Pupils are equal, round, and reactive to light.   Neck: Normal range of motion. Neck supple.   Cardiovascular: Normal rate, regular rhythm and normal heart sounds.    Pulmonary/Chest: Effort normal and breath sounds normal. No respiratory distress.   Abdominal: Soft. There is no tenderness. There is no rebound and no guarding.   Musculoskeletal: Normal range of motion. He exhibits no edema.   Neurological: He is alert and oriented to person, place, and time. He has normal sensation and normal strength.   Skin: Skin is warm and dry.   Psychiatric: Mood and affect normal.   Nursing note and vitals reviewed.      LAB RESULTS  Lab Results (last 24 hours)     Procedure Component Value Units Date/Time    CBC & Differential [[id number]] Collected:  09/11/17 1804    Specimen:  Blood Updated:  09/11/17 1815    Narrative:       The following orders were created for panel order CBC & Differential.  Procedure                               Abnormality         Status                     ---------                               -----------         ------                     CBC Auto Differential[[id number]]        Abnormal            Final result                 Please view results for these tests on the individual orders.    Comprehensive Metabolic Panel [[id number]]  (Abnormal) Collected:  09/11/17 1804    Specimen:  Blood Updated:  09/11/17 1839     Glucose 106 (H) mg/dL      BUN 15 mg/dL      Creatinine 0.99 mg/dL      Sodium 140 mmol/L      Potassium 4.1 mmol/L      Chloride 102 mmol/L      CO2 28.4 mmol/L      Calcium 9.3 mg/dL      Total Protein 7.2 g/dL      Albumin 4.30 g/dL      ALT (SGPT) 24 U/L      AST (SGOT) 19 U/L      Alkaline Phosphatase 58 U/L      Total Bilirubin 0.3  mg/dL      eGFR Non African Amer 75 mL/min/1.73      Globulin 2.9 gm/dL      A/G Ratio 1.5 g/dL      BUN/Creatinine Ratio 15.2     Anion Gap 9.6 mmol/L     Troponin [[id number]]  (Normal) Collected:  09/11/17 1804    Specimen:  Blood Updated:  09/11/17 1839     Troponin T <0.010 ng/mL     Narrative:       Troponin T Reference Ranges:  Less than 0.03 ng/mL:    Negative for AMI  0.03 to 0.09 ng/mL:      Indeterminant for AMI  Greater than 0.09 ng/mL: Positive for AMI    Magnesium [[id number]]  (Normal) Collected:  09/11/17 1804    Specimen:  Blood Updated:  09/11/17 1839     Magnesium 2.4 mg/dL     CBC Auto Differential [[id number]]  (Abnormal) Collected:  09/11/17 1804    Specimen:  Blood Updated:  09/11/17 1815     WBC 7.52 10*3/mm3      RBC 4.64 10*6/mm3      Hemoglobin 13.4 (L) g/dL      Hematocrit 41.7 %      MCV 89.9 fL      MCH 28.9 pg      MCHC 32.1 (L) g/dL      RDW 14.3 %      RDW-SD 46.8 fl      MPV 10.2 fL      Platelets 217 10*3/mm3      Neutrophil % 76.2 (H) %      Lymphocyte % 15.7 (L) %      Monocyte % 6.4 %      Eosinophil % 1.1 %      Basophil % 0.3 %      Immature Grans % 0.3 %      Neutrophils, Absolute 5.74 10*3/mm3      Lymphocytes, Absolute 1.18 10*3/mm3      Monocytes, Absolute 0.48 10*3/mm3      Eosinophils, Absolute 0.08 10*3/mm3      Basophils, Absolute 0.02 10*3/mm3      Immature Grans, Absolute 0.02 10*3/mm3     Urinalysis With / Culture If Indicated [[id number]]  (Normal) Collected:  09/11/17 2053    Specimen:  Urine from Urine, Clean Catch Updated:  09/11/17 2106     Color, UA Yellow     Appearance, UA Clear     pH, UA 5.5     Specific Gravity, UA 1.011     Glucose, UA Negative     Ketones, UA Negative     Bilirubin, UA Negative     Blood, UA Negative     Protein, UA Negative     Leuk Esterase, UA Negative     Nitrite, UA Negative     Urobilinogen, UA 0.2 E.U./dL    Narrative:       Urine microscopic not indicated.          I ordered the above labs and reviewed the results    RADIOLOGY  XR  Chest 2 View   Final Result   No evidence for acute pulmonary process. Follow-up as   clinical indications persist.       This report was finalized on 9/11/2017 6:16 PM by Dr. Seymour Simon MD.               I ordered the above noted radiological studies. Interpreted by radiologist. Reviewed by me in PACS.       PROCEDURES  Procedures    EKG           EKG time: 2052  Rhythm/Rate: NSR 64  P waves and NM: normal  QRS, axis: normal   ST and T waves: normal     Interpreted Contemporaneously by me, independently viewed  No priors provided for comparison      PROGRESS AND CONSULTS  ED Course     1725 - Ordered labs, EKG, and CXR for further evaluation.     2146 - Notified pt of normal labs, EKG, and imaging. Discussed plan to discharge the pt. Pt understands and agrees with plan, all questions addressed.    MEDICAL DECISION MAKING  Results were reviewed/discussed with the patient and they were also made aware of online access. Pt also made aware that some labs, such as cultures, will not be resulted during ER visit and follow up with PMD is necessary.     MDM  Number of Diagnoses or Management Options     Amount and/or Complexity of Data Reviewed  Clinical lab tests: reviewed and ordered (unremarkable)  Tests in the radiology section of CPT®: reviewed and ordered (CXR - No evidence for acute pulmonary process. Follow-up as clinical indications persist.)  Tests in the medicine section of CPT®: ordered and reviewed (See EKG procedure note)           DIAGNOSIS  Final diagnoses:   Episodic lightheadedness       DISPOSITION  DISCHARGE    Patient discharged in stable condition.    Reviewed implications of results, diagnosis, meds, responsibility to follow up, warning signs and symptoms of possible worsening, potential complications and reasons to return to ER.    Patient/Family voiced understanding of above instructions.    Discussed plan for discharge, as there is no emergent indication for admission.  Pt/family is  agreeable and understands need for follow up and repeat testing.  Pt is aware that discharge does not mean that nothing is wrong but it indicates no emergency is present that requires admission and they must continue care with follow-up as given below or physician of their choice.     FOLLOW-UP  Patricia Ferrara MD  [address] [address]  [phone]    Schedule an appointment as soon as possible for a visit           Medication List      Notice     No changes were made to your prescriptions during this visit.            Latest Documented Vital Signs:  As of 9:55 PM  BP- 149/87 HR- 68 Temp- 98.6 °F (37 °C) O2 sat- 96%    --  Documentation assistance provided by niranjan Rodriguez for Dr. Figueroa.  Information recorded by the scribe was done at my direction and has been verified and validated by me.     Cullen Rodriguez  09/11/17 [id number]       Federico Figueroa MD  09/11/17 0961

## 2017-09-12 NOTE — ED NOTES
Pt ambulated back to ER room without noted difficulty. He reports while he was walking out ofwork today, he became dizzy and fatigued with nausea and no vomiting. He denies SOA or chest pain. Reassurance given; call light in reach. Pts breathing even and unlabored. Pt appears in NAD at this time.       Jenn Bobo RN  09/11/17 2051

## 2017-09-13 ENCOUNTER — OFFICE VISIT (OUTPATIENT)
Dept: INTERNAL MEDICINE | Facility: CLINIC | Age: 72
End: 2017-09-13

## 2017-09-13 VITALS
RESPIRATION RATE: 14 BRPM | DIASTOLIC BLOOD PRESSURE: 72 MMHG | HEIGHT: 68 IN | BODY MASS INDEX: 27.28 KG/M2 | SYSTOLIC BLOOD PRESSURE: 122 MMHG | WEIGHT: 180 LBS

## 2017-09-13 DIAGNOSIS — R19.7 DIARRHEA OF PRESUMED INFECTIOUS ORIGIN: ICD-10-CM

## 2017-09-13 DIAGNOSIS — E86.0 DEHYDRATION: Primary | ICD-10-CM

## 2017-09-13 PROCEDURE — 99213 OFFICE O/P EST LOW 20 MIN: CPT | Performed by: INTERNAL MEDICINE

## 2017-09-13 NOTE — PATIENT INSTRUCTIONS
Lightheadedness - due to dehydration - due to GI losses. Resolved, euvolumic on exam. Doing great. Continue same hydration.   Diarrhea - most likely viral, had resolved. No interventions needed at that time.

## 2017-09-13 NOTE — PROGRESS NOTES
Subjective   Chuck Zheng is a 71 y.o. male. Here for ED visit f/u    History of Present Illness   Chuck Zheng  presents for [facility] emergency department visit f/u. Patient had to seek treatment at the mentioned above facility for the dizziness. Symptoms started day of the visit.Patient tells me that he had a bout of watery diarrhea that started on 09/07/2017 and he had treated with OTC Immodium. Chuck Zheng was diagnosed with dehydration. Tests and procedures done: x-ray, blood tests, urinalysis and ECG. All normal. Treatments: none. Patient was discharged home. Medication prescribed: none. Had been feeling better since, still had to take a nap yesterday, that is rather unusual for him.  Patient reports that at home condition improved. Patient had been compliant with recommendations and medication. Last Imodium he took 09/11/2017 am. Last BM was this am. Normal consistency. Dizziness had improved.  Records reviewed and discussed with patient.  The following portions of the patient's history were reviewed and updated as appropriate: allergies, current medications, past family history, past medical history, past social history, past surgical history and problem list.    Review of Systems   Constitutional: Negative for chills and fever.   Eyes: Negative for pain and redness.   Respiratory: Negative for cough and shortness of breath.    Cardiovascular: Negative for chest pain and leg swelling.   Neurological: Positive for light-headedness. Negative for dizziness and headaches.       Objective   Physical Exam   Constitutional: He is oriented to person, place, and time. He appears well-developed and well-nourished.   HENT:   Head: Normocephalic and atraumatic.   Right Ear: Tympanic membrane, external ear and ear canal normal.   Left Ear: Tympanic membrane, external ear and ear canal normal.   Nose: Nose normal. Right sinus exhibits no maxillary sinus tenderness and no frontal sinus tenderness. Left  sinus exhibits no maxillary sinus tenderness and no frontal sinus tenderness.   Mouth/Throat: Uvula is midline, oropharynx is clear and moist and mucous membranes are normal.   Eyes: Conjunctivae and EOM are normal. Pupils are equal, round, and reactive to light. Right eye exhibits no discharge. Left eye exhibits no discharge. No scleral icterus.   Neck: Neck supple. No JVD present.   Cardiovascular: Normal rate, regular rhythm and normal heart sounds.  Exam reveals no gallop and no friction rub.    No murmur heard.  Pulmonary/Chest: Effort normal and breath sounds normal. He has no wheezes. He has no rales.   Abdominal: Soft. Bowel sounds are normal. He exhibits no distension and no mass. There is no tenderness. There is no rebound and no guarding.   Musculoskeletal: He exhibits no edema.   Lymphadenopathy:     He has no cervical adenopathy.   Neurological: He is alert and oriented to person, place, and time. No cranial nerve deficit.   Skin: Skin is warm and dry. No rash noted.   Psychiatric: He has a normal mood and affect. His behavior is normal.   Vitals reviewed.      Assessment/Plan   Chuck was seen today for lightheadedness.    Diagnoses and all orders for this visit:    Dehydration    Diarrhea of presumed infectious origin    Lightheadedness - due to dehydration - due to GI losses. Resolved, euvolumic on exam. Doing great. Continue same hydration.   Diarrhea - most likely viral, had resolved. No interventions needed at that time.

## 2017-11-21 ENCOUNTER — LAB (OUTPATIENT)
Dept: INTERNAL MEDICINE | Facility: CLINIC | Age: 72
End: 2017-11-21

## 2017-11-21 DIAGNOSIS — Z00.00 HEALTH CARE MAINTENANCE: ICD-10-CM

## 2017-11-21 LAB
ALBUMIN SERPL-MCNC: 4.4 G/DL (ref 3.5–5.2)
ALBUMIN/GLOB SERPL: 1.9 G/DL
ALP SERPL-CCNC: 61 U/L (ref 39–117)
ALT SERPL-CCNC: 19 U/L (ref 1–41)
APPEARANCE UR: ABNORMAL
AST SERPL-CCNC: 17 U/L (ref 1–40)
BASOPHILS # BLD AUTO: 0.02 10*3/MM3 (ref 0–0.2)
BASOPHILS NFR BLD AUTO: 0.4 % (ref 0–1.5)
BILIRUB SERPL-MCNC: 0.4 MG/DL (ref 0.1–1.2)
BILIRUB UR QL STRIP: NEGATIVE
BUN SERPL-MCNC: 20 MG/DL (ref 8–23)
BUN/CREAT SERPL: 19.4 (ref 7–25)
CALCIUM SERPL-MCNC: 9.5 MG/DL (ref 8.6–10.5)
CHLORIDE SERPL-SCNC: 105 MMOL/L (ref 98–107)
CHOLEST SERPL-MCNC: 158 MG/DL (ref 0–200)
CO2 SERPL-SCNC: 30.1 MMOL/L (ref 22–29)
COLOR UR: YELLOW
CREAT SERPL-MCNC: 1.03 MG/DL (ref 0.76–1.27)
EOSINOPHIL # BLD AUTO: 0.14 10*3/MM3 (ref 0–0.7)
EOSINOPHIL # BLD AUTO: 2.9 % (ref 0.3–6.2)
ERYTHROCYTE [DISTWIDTH] IN BLOOD BY AUTOMATED COUNT: 13.9 % (ref 11.5–14.5)
GLOBULIN SER CALC-MCNC: 2.3 GM/DL
GLUCOSE SERPL-MCNC: 97 MG/DL (ref 65–99)
GLUCOSE UR QL: NEGATIVE
HCT VFR BLD AUTO: 41.1 % (ref 40.4–52.2)
HDLC SERPL-MCNC: 56 MG/DL (ref 40–60)
HGB BLD-MCNC: 13.3 G/DL (ref 13.7–17.6)
HGB UR QL STRIP: NEGATIVE
IMM GRANULOCYTES # BLD: 0 10*3/MM3 (ref 0–0.03)
IMM GRANULOCYTES NFR BLD: 0 % (ref 0–0.5)
KETONES UR QL STRIP: NEGATIVE
LDLC SERPL CALC-MCNC: 81 MG/DL (ref 0–100)
LEUKOCYTE ESTERASE UR QL STRIP: NEGATIVE
LYMPHOCYTES # BLD AUTO: 1.26 10*3/MM3 (ref 0.9–4.8)
LYMPHOCYTES NFR BLD AUTO: 26.4 % (ref 19.6–45.3)
MCH RBC QN AUTO: 29.1 PG (ref 27–32.7)
MCHC RBC AUTO-ENTMCNC: 32.4 G/DL (ref 32.6–36.4)
MCV RBC AUTO: 89.9 FL (ref 79.8–96.2)
MONOCYTES # BLD AUTO: 0.68 10*3/MM3 (ref 0.2–1.2)
MONOCYTES NFR BLD AUTO: 14.2 % (ref 5–12)
NEUTROPHILS # BLD AUTO: 2.68 10*3/MM3 (ref 1.9–8.1)
NEUTROPHILS NFR BLD AUTO: 56.1 % (ref 42.7–76)
NITRITE UR QL STRIP: NEGATIVE
PH UR STRIP.AUTO: 5.5 [PH] (ref 5–8)
PLATELET # BLD AUTO: 188 10*3/MM3 (ref 140–500)
POTASSIUM SERPL-SCNC: 4.3 MMOL/L (ref 3.5–5.2)
PROT SERPL-MCNC: 6.7 G/DL (ref 6–8.5)
PROTEIN: NEGATIVE
PSA SERPL-MCNC: 3.72 NG/ML (ref 0–4)
RBC # BLD AUTO: 4.57 10*6/MM3 (ref 4.6–6)
SODIUM SERPL-SCNC: 142 MMOL/L (ref 136–145)
SP GR UR: ABNORMAL (ref 1–1.03)
TRIGL SERPL-MCNC: 105 MG/DL (ref 0–150)
TSH SERPL-ACNC: 1.52 MIU/ML (ref 0.27–4.2)
UROBILINOGEN UR STRIP-MCNC: ABNORMAL MG/DL
VLDLC SERPL-MCNC: 21 MG/DL (ref 5–40)
WBC # BLD AUTO: 4.78 10*3/MM3 (ref 4.5–10.7)

## 2017-12-12 ENCOUNTER — OFFICE VISIT (OUTPATIENT)
Dept: INTERNAL MEDICINE | Facility: CLINIC | Age: 72
End: 2017-12-12

## 2017-12-12 VITALS
RESPIRATION RATE: 14 BRPM | HEIGHT: 68 IN | BODY MASS INDEX: 26.52 KG/M2 | DIASTOLIC BLOOD PRESSURE: 62 MMHG | SYSTOLIC BLOOD PRESSURE: 122 MMHG | WEIGHT: 175 LBS

## 2017-12-12 DIAGNOSIS — I25.10 ATHEROSCLEROSIS OF NATIVE CORONARY ARTERY OF NATIVE HEART WITHOUT ANGINA PECTORIS: ICD-10-CM

## 2017-12-12 DIAGNOSIS — R39.198 URINARY DYSFUNCTION: Primary | ICD-10-CM

## 2017-12-12 DIAGNOSIS — Z00.00 HEALTH CARE MAINTENANCE: ICD-10-CM

## 2017-12-12 DIAGNOSIS — E78.00 PURE HYPERCHOLESTEROLEMIA: ICD-10-CM

## 2017-12-12 PROBLEM — R39.14 BENIGN PROSTATIC HYPERPLASIA WITH INCOMPLETE BLADDER EMPTYING: Status: ACTIVE | Noted: 2017-12-12

## 2017-12-12 PROBLEM — R53.83 FATIGUE: Status: RESOLVED | Noted: 2017-07-03 | Resolved: 2017-12-12

## 2017-12-12 PROBLEM — I25.2 HISTORY OF MYOCARDIAL INFARCTION: Status: ACTIVE | Noted: 2017-12-12

## 2017-12-12 PROBLEM — E86.0 DEHYDRATION: Status: RESOLVED | Noted: 2017-09-13 | Resolved: 2017-12-12

## 2017-12-12 PROBLEM — K59.1 FUNCTIONAL DIARRHEA: Status: ACTIVE | Noted: 2017-09-13

## 2017-12-12 PROBLEM — N40.1 BENIGN PROSTATIC HYPERPLASIA WITH INCOMPLETE BLADDER EMPTYING: Status: ACTIVE | Noted: 2017-12-12

## 2017-12-12 PROBLEM — I21.9 MYOCARDIAL INFARCTION (HCC): Status: ACTIVE | Noted: 2017-12-12

## 2017-12-12 PROBLEM — K59.1 FUNCTIONAL DIARRHEA: Status: RESOLVED | Noted: 2017-09-13 | Resolved: 2017-12-12

## 2017-12-12 PROCEDURE — 99397 PER PM REEVAL EST PAT 65+ YR: CPT | Performed by: INTERNAL MEDICINE

## 2017-12-12 RX ORDER — CARVEDILOL 3.12 MG/1
3.12 TABLET ORAL 2 TIMES DAILY WITH MEALS
Qty: 30 TABLET | Refills: 11
Start: 2017-12-12 | End: 2017-12-12 | Stop reason: SDUPTHER

## 2017-12-12 RX ORDER — CARVEDILOL 3.12 MG/1
3.12 TABLET ORAL 2 TIMES DAILY WITH MEALS
Qty: 180 TABLET | Refills: 3
Start: 2017-12-12 | End: 2018-01-30 | Stop reason: SDUPTHER

## 2017-12-12 RX ORDER — ALFUZOSIN HYDROCHLORIDE 10 MG/1
10 TABLET, EXTENDED RELEASE ORAL DAILY
Qty: 30 TABLET | Refills: 3 | Status: SHIPPED | OUTPATIENT
Start: 2017-12-12 | End: 2018-01-11 | Stop reason: SDUPTHER

## 2017-12-12 NOTE — PATIENT INSTRUCTIONS
Risk evaluation:  1. Cardiovascular risk factors: patient has CAD and is being treated.Will try to increase the mcgee of Carvedilol to 3.125 mg twice a day.  2. Diabetes risk factors: impaired fasting blood sugars.   3. Cancer risk factors: FH of colon polyps.  4. Risky behavior: none. Use of seat belts: regular. Use of sunscreens: none, patient states that practices sun avoidance. Usually he wears a hat. Tattoos: none. H/o blood transfusion/organ transplant before 1992 or clotting factors transfusion before 1987: none.    Prevention:   Cholesterol test up to date. Cholesterol is well controlled by medication.There had been mild increase in the LDL: from 70 to 81. Diet discussed. Continue same dose of Crestor.  .Blood sugar test up to date. Fasting blood sugar  is normal.Improved with gluten-free diet.  Hep C testing (for those born [id number]): completed..  Colonoscopy up to date. Recommended every 10 years. Next screening is due in 2024..  Testicular self exams recommended once a month. Advised that any firm testicular nodules to be reported immediately.  Prostate cancer screening and prevention discussed, including discrepancy in the guidelines and position of different professional societies on this issue, including current guidelines issued by USPTF, ACP and Urological Association. Patient prefers to continue yearly screening with PSA and rectal exams.    Urinary s-ms - due to BPH (benign prostate hyperplasia). Large prostate on rectal examination. Will try once a day Uroxatral. I had instructed patiwnt not to increase the dose of Carvedilol to 3.125 mg twice a day immediately, but let him start Uroxatral and in 3 weeks increase Carvedilol dose as discused. This is to avoid drop in the blood pressure. Patient to call us with any unusual symptoms or concerns.

## 2017-12-12 NOTE — PROGRESS NOTES
"Darren Zheng is a 71 y.o. male.     History of Present Illness   /62 (BP Location: Left arm, Patient Position: Sitting, Cuff Size: Adult)  Resp 14  Ht 172.7 cm (68\")  Wt 79.4 kg (175 lb)  BMI 26.61 kg/m2  Patient is here for yearly CPE. Last CPE was 1 year ago.  PMH, SH and FH reviewed. Changes in the FH: none.  Patient rates his health as good.  Tobacco use: none.  Alcohol use: 2-3 days a week.  Recreational drugs use: none.  Medication list rewieved.  Diet: gluten free.  Patient exercises  2 times a week and walking.  Marital status: .  Employment: full time.  Patient rates his stress level as low.  Dental health: good. Brushes teeth 2 times a day, flosses once a day. Dental visits 2 times a year.  Vision correction: not needed.  Hearing: normal.    Recent vaccinations:   flu   is up to date and recommended yearly  Tdap  is up to date  Zostavax completed.      Current Outpatient Prescriptions:   •  aspirin (ASPIR) 81 MG EC tablet, Take  by mouth., Disp: , Rfl:   •  carvedilol (COREG) 3.125 MG tablet, Take 0.5 tablets by mouth 2 (Two) Times a Day With Meals., Disp: 30 tablet, Rfl: 11  •  Cetirizine HCl (ZYRTEC ALLERGY) 10 MG capsule, Take  by mouth., Disp: , Rfl:   •  Cholecalciferol (VITAMIN D3) 1000 UNIT/SPRAY liquid, Take  by mouth., Disp: , Rfl:   •  Cyanocobalamin (VITAMIN B 12) 100 MCG lozenge, Take  by mouth., Disp: , Rfl:   •  Multiple Vitamin (MULTI VITAMIN DAILY) tablet, Take  by mouth., Disp: , Rfl:   •  Omega-3 Fatty Acids (FISH OIL DOUBLE STRENGTH) 1200 MG capsule, Take  by mouth., Disp: , Rfl:   •  rosuvastatin (CRESTOR) 40 MG tablet, Take 1 tablet by mouth Daily., Disp: 90 tablet, Rfl: 3                          The following portions of the patient's history were reviewed and updated as appropriate: allergies, current medications, past family history, past medical history, past social history, past surgical history and problem list.    Review of Systems "   Constitutional: Negative for chills and fever.   HENT: Positive for sinus pressure. Negative for postnasal drip and sore throat.    Eyes: Positive for visual disturbance. Negative for pain and itching.   Respiratory: Positive for cough. Negative for chest tightness.    Cardiovascular: Negative for chest pain and leg swelling.   Gastrointestinal: Negative for abdominal pain and blood in stool.   Endocrine: Negative for cold intolerance and heat intolerance.   Genitourinary: Positive for difficulty urinating (patient does noyt believe that he empries his baladder completely. Often he ahas some more to urinate few minutes after he had finished urinating. Nocturia x 2-3. No urgency) and frequency (increased frequency ). Negative for flank pain.   Musculoskeletal: Negative for back pain and neck pain.   Skin: Negative for color change and rash.   Neurological: Positive for dizziness and headaches. Negative for weakness.   Hematological: Negative for adenopathy. Does not bruise/bleed easily.   Psychiatric/Behavioral: Positive for sleep disturbance. The patient is not nervous/anxious.        Objective   Physical Exam   Constitutional: He is oriented to person, place, and time. Vital signs are normal. He appears well-developed and well-nourished. No distress.   HENT:   Head: Normocephalic and atraumatic.   Right Ear: External ear normal.   Left Ear: External ear normal.   Nose: Nose normal. No mucosal edema. Right sinus exhibits no maxillary sinus tenderness and no frontal sinus tenderness. Left sinus exhibits no maxillary sinus tenderness and no frontal sinus tenderness.   Mouth/Throat: Oropharynx is clear and moist. No oropharyngeal exudate.   Eyes: Conjunctivae, EOM and lids are normal. Pupils are equal, round, and reactive to light. Right eye exhibits no discharge. Left eye exhibits no discharge. Right conjunctiva is not injected. Left conjunctiva is not injected. No scleral icterus. Right eye exhibits normal  extraocular motion. Left eye exhibits normal extraocular motion.   Neck: Normal range of motion and full passive range of motion without pain. Neck supple. No JVD present. Carotid bruit is not present. No thyromegaly present.   Cardiovascular: Normal rate, regular rhythm, S1 normal, S2 normal, normal heart sounds and intact distal pulses.  PMI is not displaced.  Exam reveals no gallop and no friction rub.    No murmur heard.  Pulmonary/Chest: Effort normal and breath sounds normal. No accessory muscle usage. No respiratory distress. He has no decreased breath sounds. He has no wheezes. He has no rhonchi. He has no rales.   Abdominal: Soft. Bowel sounds are normal. He exhibits no distension, no pulsatile liver, no fluid wave, no abdominal bruit, no ascites and no mass. There is no tenderness. There is no rebound and no guarding.   Genitourinary: Rectal exam shows no external hemorrhoid, no internal hemorrhoid, no fissure, no mass, no tenderness and anal tone normal. Prostate is enlarged. Prostate is not tender.   Musculoskeletal: He exhibits no edema or deformity.   Lymphadenopathy:        Head (right side): No submental, no submandibular, no preauricular, no posterior auricular and no occipital adenopathy present.        Head (left side): No submental, no submandibular, no tonsillar, no preauricular, no posterior auricular and no occipital adenopathy present.     He has no cervical adenopathy.        Right cervical: No superficial cervical, no deep cervical and no posterior cervical adenopathy present.       Left cervical: No superficial cervical, no deep cervical and no posterior cervical adenopathy present.        Right: No supraclavicular adenopathy present.        Left: No supraclavicular adenopathy present.   Neurological: He is alert and oriented to person, place, and time. He has normal strength and normal reflexes. He displays normal reflexes. No cranial nerve deficit. He exhibits normal muscle tone.  Coordination normal.   Reflex Scores:       Bicep reflexes are 2+ on the right side and 2+ on the left side.       Patellar reflexes are 2+ on the right side and 2+ on the left side.  Skin: Skin is warm and dry. No rash noted. He is not diaphoretic. No erythema.   Few cherry hemangiomas   Psychiatric: He has a normal mood and affect. His speech is normal and behavior is normal. Thought content normal.   Vitals reviewed.      Assessment/Plan   Chuck was seen today for annual exam.    Diagnoses and all orders for this visit:    Urinary dysfunction  -     alfuzosin (UROXATRAL) 10 MG 24 hr tablet; Take 1 tablet by mouth Daily.    Atherosclerosis of native coronary artery of native heart without angina pectoris  -     Discontinue: carvedilol (COREG) 3.125 MG tablet; Take 1 tablet by mouth 2 (Two) Times a Day With Meals.  -     carvedilol (COREG) 3.125 MG tablet; Take 1 tablet by mouth 2 (Two) Times a Day With Meals.    Pure hypercholesterolemia    Health care maintenance        Risk evaluation:  1. Cardiovascular risk factors: patient has CAD and is being treated.Will try to increase the mcgee of Carvedilol to 3.125 mg twice a day.  2. Diabetes risk factors: impaired fasting blood sugars. Being treated with C-PAP for PLACIDO. Good compliance.  3. Cancer risk factors: FH of colon polyps.  4. Risky behavior: none. Use of seat belts: regular. Use of sunscreens: none, patient states that practices sun avoidance. Usually he wears a hat. Tattoos: none. H/o blood transfusion/organ transplant before 1992 or clotting factors transfusion before 1987: none.    Prevention:   Cholesterol test up to date. Cholesterol is well controlled by medication.There had been mild increase in the LDL: from 70 to 81. Diet discussed. Continue same dose of Crestor.  .Blood sugar test up to date. Fasting blood sugar  is normal.Improved with gluten-free diet.  Hep C testing (for those born [birth year]50-3439): completed..  Colonoscopy up to date. Recommended every  10 years. Next screening is due in 2024..  Testicular self exams recommended once a month. Advised that any firm testicular nodules to be reported immediately.  Prostate cancer screening and prevention discussed, including discrepancy in the guidelines and position of different professional societies on this issue, including current guidelines issued by USPTF, ACP and Urological Association. Patient prefers to continue yearly screening with PSA and rectal exams.    Urinary s-ms - due to BPH with prostatismus. Enlarged prostate on rectal examination. Will try once a day Uroxatral. I had instructed patient not to increase the dose of Carvedilol to 3.125 mg twice a day immediately, but let him start Uroxatral and in 3 weeks increase in Carvedilol dose. This is to avoid decrease in the blood pressure. Patient to call us if any unusual s-ms.

## 2018-01-11 ENCOUNTER — OFFICE VISIT (OUTPATIENT)
Dept: INTERNAL MEDICINE | Facility: CLINIC | Age: 73
End: 2018-01-11

## 2018-01-11 VITALS
BODY MASS INDEX: 26.37 KG/M2 | DIASTOLIC BLOOD PRESSURE: 64 MMHG | RESPIRATION RATE: 14 BRPM | HEIGHT: 68 IN | WEIGHT: 174 LBS | SYSTOLIC BLOOD PRESSURE: 122 MMHG

## 2018-01-11 DIAGNOSIS — R39.198 URINARY DYSFUNCTION: ICD-10-CM

## 2018-01-11 DIAGNOSIS — R39.14 BENIGN PROSTATIC HYPERPLASIA WITH INCOMPLETE BLADDER EMPTYING: Primary | ICD-10-CM

## 2018-01-11 DIAGNOSIS — E53.8 COBALAMIN DEFICIENCY: ICD-10-CM

## 2018-01-11 DIAGNOSIS — N40.1 BENIGN PROSTATIC HYPERPLASIA WITH INCOMPLETE BLADDER EMPTYING: Primary | ICD-10-CM

## 2018-01-11 DIAGNOSIS — E78.00 PURE HYPERCHOLESTEROLEMIA: ICD-10-CM

## 2018-01-11 PROCEDURE — 99213 OFFICE O/P EST LOW 20 MIN: CPT | Performed by: INTERNAL MEDICINE

## 2018-01-11 RX ORDER — ALFUZOSIN HYDROCHLORIDE 10 MG/1
10 TABLET, EXTENDED RELEASE ORAL DAILY
Qty: 90 TABLET | Refills: 3 | Status: SHIPPED | OUTPATIENT
Start: 2018-01-11 | End: 2018-02-20

## 2018-01-11 NOTE — PROGRESS NOTES
Subjective   Chuck Zheng is a 72 y.o. male.     History of Present Illness   Chuck Zheng 72 y.o. male presents today for BPH f/u. Last was seen on 12/12/2017 and on that visit medication was changed due to new onset of symptoms.We had started Uroxatral.  Patient is compliant with treatment and denies  side effects. Patient states that the mood had much improved with the change in medical treatment. Nocturia is down to 0-1, the frequency of urination is much less.    Chuck Zheng 72 y.o. male presents today for cardiovascular disease f/u. Last was seen on 12/12/2017 and on that visit medication was changed due to concerenes of optimyzing his teratment for full CV protection.We had increased the dose of Carvedilol to 3.125 mg.  Patient is compliant with treatment and denies  side effects. Patient reports no change in symptoms with medication change.     The following portions of the patient's history were reviewed and updated as appropriate: allergies, current medications, past family history, past medical history, past social history, past surgical history and problem list.    Review of Systems   Constitutional: Negative for chills and fever.   Eyes: Negative for pain and redness.   Respiratory: Negative for cough and shortness of breath.    Cardiovascular: Negative for chest pain and leg swelling.   Neurological: Negative for dizziness and headaches.       Objective   Physical Exam   Constitutional: He is oriented to person, place, and time. He appears well-developed and well-nourished.   HENT:   Head: Normocephalic and atraumatic.   Right Ear: Tympanic membrane, external ear and ear canal normal.   Left Ear: Tympanic membrane, external ear and ear canal normal.   Nose: Nose normal. Right sinus exhibits no maxillary sinus tenderness and no frontal sinus tenderness. Left sinus exhibits no maxillary sinus tenderness and no frontal sinus tenderness.   Mouth/Throat: Uvula is midline, oropharynx is  clear and moist and mucous membranes are normal.   Eyes: Conjunctivae and EOM are normal. Pupils are equal, round, and reactive to light. Right eye exhibits no discharge. Left eye exhibits no discharge. No scleral icterus.   Neck: Neck supple. No JVD present.   Cardiovascular: Normal rate, regular rhythm and normal heart sounds.  Exam reveals no gallop and no friction rub.    No murmur heard.  Pulmonary/Chest: Effort normal and breath sounds normal. He has no wheezes. He has no rales.   Musculoskeletal: He exhibits no edema.   Lymphadenopathy:     He has no cervical adenopathy.   Neurological: He is alert and oriented to person, place, and time. No cranial nerve deficit.   Skin: Skin is warm and dry. No rash noted.   Psychiatric: He has a normal mood and affect. His behavior is normal.   Vitals reviewed.      Assessment/Plan   Chuck was seen today for benign prostatic hypertrophy and coronary artery disease.    Diagnoses and all orders for this visit:    Benign prostatic hyperplasia with incomplete bladder emptying    Urinary dysfunction  -     alfuzosin (UROXATRAL) 10 MG 24 hr tablet; Take 1 tablet by mouth Daily.      BPH - much better controlled with Uroxatrol, non side effects. Will continue same.  CAD - able to tolerate increase in the dose. At the same time didn't have much chance to exercise. Will ask patient to exercise, and later will attempt next dose increase, if still no side effects.

## 2018-01-30 ENCOUNTER — TELEPHONE (OUTPATIENT)
Dept: INTERNAL MEDICINE | Facility: CLINIC | Age: 73
End: 2018-01-30

## 2018-01-30 DIAGNOSIS — I25.10 ATHEROSCLEROSIS OF NATIVE CORONARY ARTERY OF NATIVE HEART WITHOUT ANGINA PECTORIS: ICD-10-CM

## 2018-01-30 RX ORDER — CARVEDILOL 3.12 MG/1
3.12 TABLET ORAL 2 TIMES DAILY WITH MEALS
Qty: 180 TABLET | Refills: 1 | Status: SHIPPED | OUTPATIENT
Start: 2018-01-30 | End: 2018-02-20 | Stop reason: SDUPTHER

## 2018-01-30 NOTE — TELEPHONE ENCOUNTER
----- Message from Shelby Senior sent at 1/30/2018  9:54 AM EST -----  Contact: pt  Pt is calling for a refill     FOR  carvedilol (COREG) 3.125 MG tablet  90 days 2 a day      Patient requests RX SENT TO   [facility] [address] [place] [address] [address] AT [facility] [address]  - [phone]  - [phone] FX          Please and thank you.

## 2018-02-16 ENCOUNTER — APPOINTMENT (OUTPATIENT)
Dept: CT IMAGING | Facility: HOSPITAL | Age: 73
End: 2018-02-16

## 2018-02-16 ENCOUNTER — HOSPITAL ENCOUNTER (EMERGENCY)
Facility: HOSPITAL | Age: 73
Discharge: HOME | End: 2018-02-16
Attending: EMERGENCY MEDICINE | Admitting: EMERGENCY MEDICINE

## 2018-02-16 VITALS
OXYGEN SATURATION: 99 % | RESPIRATION RATE: 16 BRPM | HEIGHT: 68 IN | WEIGHT: 172 LBS | TEMPERATURE: 97.6 F | SYSTOLIC BLOOD PRESSURE: 153 MMHG | DIASTOLIC BLOOD PRESSURE: 82 MMHG | BODY MASS INDEX: 26.07 KG/M2 | HEART RATE: 73 BPM

## 2018-02-16 DIAGNOSIS — R10.13 EPIGASTRIC PAIN: Primary | ICD-10-CM

## 2018-02-16 DIAGNOSIS — R55 VASOVAGAL NEAR SYNCOPE: ICD-10-CM

## 2018-02-16 LAB
ALBUMIN SERPL-MCNC: 4 G/DL (ref 3.5–5.2)
ALBUMIN/GLOB SERPL: 1.5 G/DL
ALP SERPL-CCNC: 66 U/L (ref 39–117)
ALT SERPL W P-5'-P-CCNC: 45 U/L (ref 1–41)
ANION GAP SERPL CALCULATED.3IONS-SCNC: 11.8 MMOL/L
AST SERPL-CCNC: 70 U/L (ref 1–40)
BASOPHILS # BLD AUTO: 0.01 10*3/MM3 (ref 0–0.2)
BASOPHILS NFR BLD AUTO: 0.2 % (ref 0–1.5)
BILIRUB SERPL-MCNC: 0.3 MG/DL (ref 0.1–1.2)
BUN BLD-MCNC: 18 MG/DL (ref 8–23)
BUN/CREAT SERPL: 18.6 (ref 7–25)
CALCIUM SPEC-SCNC: 9.3 MG/DL (ref 8.6–10.5)
CHLORIDE SERPL-SCNC: 104 MMOL/L (ref 98–107)
CO2 SERPL-SCNC: 27.2 MMOL/L (ref 22–29)
CREAT BLD-MCNC: 0.97 MG/DL (ref 0.76–1.27)
DEPRECATED RDW RBC AUTO: 45.1 FL (ref 37–54)
EOSINOPHIL # BLD AUTO: 0.1 10*3/MM3 (ref 0–0.7)
EOSINOPHIL NFR BLD AUTO: 1.8 % (ref 0.3–6.2)
ERYTHROCYTE [DISTWIDTH] IN BLOOD BY AUTOMATED COUNT: 13.7 % (ref 11.5–14.5)
GFR SERPL CREATININE-BSD FRML MDRD: 76 ML/MIN/1.73
GLOBULIN UR ELPH-MCNC: 2.7 GM/DL
GLUCOSE BLD-MCNC: 122 MG/DL (ref 65–99)
HCT VFR BLD AUTO: 41.1 % (ref 40.4–52.2)
HGB BLD-MCNC: 13.1 G/DL (ref 13.7–17.6)
IMM GRANULOCYTES # BLD: 0 10*3/MM3 (ref 0–0.03)
IMM GRANULOCYTES NFR BLD: 0 % (ref 0–0.5)
LIPASE SERPL-CCNC: 32 U/L (ref 13–60)
LYMPHOCYTES # BLD AUTO: 1.73 10*3/MM3 (ref 0.9–4.8)
LYMPHOCYTES NFR BLD AUTO: 30.9 % (ref 19.6–45.3)
MCH RBC QN AUTO: 28.5 PG (ref 27–32.7)
MCHC RBC AUTO-ENTMCNC: 31.9 G/DL (ref 32.6–36.4)
MCV RBC AUTO: 89.5 FL (ref 79.8–96.2)
MONOCYTES # BLD AUTO: 0.43 10*3/MM3 (ref 0.2–1.2)
MONOCYTES NFR BLD AUTO: 7.7 % (ref 5–12)
NEUTROPHILS # BLD AUTO: 3.33 10*3/MM3 (ref 1.9–8.1)
NEUTROPHILS NFR BLD AUTO: 59.4 % (ref 42.7–76)
PLATELET # BLD AUTO: 186 10*3/MM3 (ref 140–500)
PMV BLD AUTO: 10.6 FL (ref 6–12)
POTASSIUM BLD-SCNC: 4.5 MMOL/L (ref 3.5–5.2)
PROT SERPL-MCNC: 6.7 G/DL (ref 6–8.5)
RBC # BLD AUTO: 4.59 10*6/MM3 (ref 4.6–6)
SODIUM BLD-SCNC: 143 MMOL/L (ref 136–145)
TROPONIN T SERPL-MCNC: <0.01 NG/ML (ref 0–0.03)
WBC NRBC COR # BLD: 5.6 10*3/MM3 (ref 4.5–10.7)

## 2018-02-16 PROCEDURE — 85025 COMPLETE CBC W/AUTO DIFF WBC: CPT | Performed by: EMERGENCY MEDICINE

## 2018-02-16 PROCEDURE — 84484 ASSAY OF TROPONIN QUANT: CPT | Performed by: EMERGENCY MEDICINE

## 2018-02-16 PROCEDURE — 80053 COMPREHEN METABOLIC PANEL: CPT | Performed by: EMERGENCY MEDICINE

## 2018-02-16 PROCEDURE — 99284 EMERGENCY DEPT VISIT MOD MDM: CPT

## 2018-02-16 PROCEDURE — 93010 ELECTROCARDIOGRAM REPORT: CPT | Performed by: INTERNAL MEDICINE

## 2018-02-16 PROCEDURE — 0 IOPAMIDOL 61 % SOLUTION: Performed by: EMERGENCY MEDICINE

## 2018-02-16 PROCEDURE — 74177 CT ABD & PELVIS W/CONTRAST: CPT

## 2018-02-16 PROCEDURE — 93005 ELECTROCARDIOGRAM TRACING: CPT | Performed by: EMERGENCY MEDICINE

## 2018-02-16 PROCEDURE — 83690 ASSAY OF LIPASE: CPT | Performed by: EMERGENCY MEDICINE

## 2018-02-16 RX ADMIN — IOPAMIDOL 85 ML: 612 INJECTION, SOLUTION INTRAVENOUS at 21:19

## 2018-02-16 RX ADMIN — SODIUM CHLORIDE 500 ML: 9 INJECTION, SOLUTION INTRAVENOUS at 20:23

## 2018-02-17 NOTE — ED TRIAGE NOTES
Pt to ER via EMS stretcher from [facility].  Started eating and had abdominal pain with near syncope.  Pain worse when sitting up.  B/P 92 systolic upon EMS arrival.  Pt feeling better now lying flat. EMS states when they sat him up for a blood pressure pt became diaphoretic as abd pressure increased.

## 2018-02-17 NOTE — ED PROVIDER NOTES
"EMERGENCY DEPARTMENT ENCOUNTER       CHIEF COMPLAINT  Chief Complaint: Near-Syncope  History given by: Patient, Family  History limited by: N/A  Room Number: 11/11  PMD: Maura Ferrara MD      HPI:  HPI Comments: Pt reports that while pt was having dinner tonight at [facility], pt developed \"pressure-like\" abdominal pain, lightheadedness, nausea, and diaphoresis. Consequently, pt lowered himself to the ground, but did not fully lose consciousness. Pt reports that his near-syncopal episode lasted briefly and has now resolved. Pt denies sustaining any significant injuries during the near-syncopal episode (including head injury). Pt reports that his abdominal pain, nausea, and diaphoresis have also now resolved. Pt denies focal weakness/numbness, speech/visual difficulties, chest pain, dyspnea, palpitations, headache, vomiting, and diarrhea. Pt reports that he had a similar episode of near-syncope several years ago and at that time, pt was diagnosed with a vasovagal episode. There are no other complaints at this time.     Patient is a 72 y.o. male presenting with dizziness.   Dizziness   Quality:  Lightheadedness  Severity:  Moderate  Onset quality:  Gradual  Duration: onset earlier tonight.  Timing:  Constant  Progression:  Resolved  Chronicity:  Recurrent  Context comment:  Near-syncopal episode occurred while pt was eating dinner tonight.  Relieved by:  Nothing  Worsened by:  Nothing  Associated symptoms: nausea (now resolved)    Associated symptoms: no chest pain, no diarrhea, no headaches, no palpitations, no shortness of breath, no syncope, no vomiting and no weakness          PAST MEDICAL HISTORY  Active Ambulatory Problems     Diagnosis Date Noted   • Atopic rhinitis 05/16/2016   • Benign essential hypertension 05/16/2016   • Atherosclerosis of coronary artery 05/16/2016   • Impaired fasting blood sugar 05/16/2016   • Hyperlipidemia 05/16/2016   • Cobalamin deficiency 05/16/2016   • Health care maintenance " 11/17/2016   • PLACIDO on CPAP Check Pressure +11.5 11/27/2016   • History of myocardial infarction 12/12/2017   • Benign prostatic hyperplasia with incomplete bladder emptying 12/12/2017     Resolved Ambulatory Problems     Diagnosis Date Noted   • Candidal intertrigo 05/16/2016   • Primary insomnia 05/16/2016   • Elevated PSA 11/21/2016   • Mass of soft tissue of right upper extremity 06/07/2017   • Fatigue 07/03/2017   • Dehydration 09/13/2017   • Functional diarrhea 09/13/2017     Past Medical History:   Diagnosis Date   • Adhesive capsulitis of shoulder    • Benign prostatic hypertrophy    • Functional diarrhea 9/13/2017   • H/O cardiovascular stress test    • Hemorrhoid    • Hyperlipidemia    • Myocardial infarction    • Nephrolithiasis    • Obstructive sleep apnea    • Primary insomnia 5/16/2016   • Rupture of flexor tendon of hand    • Syncope          PAST SURGICAL HISTORY  Past Surgical History:   Procedure Laterality Date   • CHOLECYSTECTOMY     • COLONOSCOPY      06/2003, nl, , 02/2014 , nl, needs C-scope in 2024   • CORONARY STENT PLACEMENT      5/2007 distal RCA   • TONSILLECTOMY           FAMILY HISTORY  Family History   Problem Relation Age of Onset   • Dementia Mother    • Glaucoma Mother    • Heart disease Father    • Colon polyps Father          SOCIAL HISTORY  Social History     Social History   • Marital status:      Spouse name: N/A   • Number of children: N/A   • Years of education: N/A     Occupational History   • Not on file.     Social History Main Topics   • Smoking status: Never Smoker   • Smokeless tobacco: Not on file   • Alcohol use Yes      Comment: 3x weekly   • Drug use: Not on file   • Sexual activity: Not on file     Other Topics Concern   • Not on file     Social History Narrative         ALLERGIES  Ace inhibitors; Beta adrenergic blockers; Cephalexin; and Sulfa antibiotics        REVIEW OF SYSTEMS  Review of Systems   Constitutional: Positive for diaphoresis  (now resolved). Negative for chills.   HENT: Negative for congestion, rhinorrhea and sore throat.    Eyes: Negative for pain and visual disturbance.   Respiratory: Negative for cough and shortness of breath.    Cardiovascular: Negative for chest pain, palpitations, leg swelling and syncope.   Gastrointestinal: Positive for abdominal pain (now resolved) and nausea (now resolved). Negative for diarrhea and vomiting.   Genitourinary: Negative for difficulty urinating, dysuria, flank pain and frequency.   Musculoskeletal: Negative for myalgias, neck pain and neck stiffness.   Skin: Negative for rash.   Neurological: Positive for dizziness (lightheadedness - now resolved) and light-headedness (now resolved). Negative for syncope, speech difficulty, weakness, numbness and headaches.   Psychiatric/Behavioral: Negative.    All other systems reviewed and are negative.           PHYSICAL EXAM  ED Triage Vitals   Temp Heart Rate Resp BP SpO2   02/16/18 1950 02/16/18 1945 02/16/18 1945 02/16/18 1945 02/16/18 1945   97.5 °F (36.4 °C) 48 18 120/68 98 %      Temp src Heart Rate Source Patient Position BP Location FiO2 (%)   02/16/18 1950 02/16/18 1945 02/16/18 1945 -- --   Oral Monitor Lying         Physical Exam   Constitutional: He is oriented to person, place, and time. No distress.   HENT:   Head: Normocephalic.   Mouth/Throat: Mucous membranes are normal.   Eyes: EOM are normal. Pupils are equal, round, and reactive to light.   Neck: Normal range of motion. Neck supple.   Cardiovascular: Regular rhythm and normal heart sounds.  Bradycardia present.    Pulmonary/Chest: Effort normal and breath sounds normal. No respiratory distress. He has no decreased breath sounds. He has no wheezes. He has no rhonchi. He has no rales.   Abdominal: Soft. There is tenderness in the epigastric area. There is no rebound and no guarding.   Musculoskeletal: Normal range of motion.   Neurological: He is alert and oriented to person, place, and  time. He has normal sensation.   Skin: Skin is warm and dry.   Psychiatric: Mood and affect normal.   Nursing note and vitals reviewed.          LAB RESULTS  Recent Results (from the past 24 hour(s))   Comprehensive Metabolic Panel    Collection Time: 02/16/18  8:21 PM   Result Value Ref Range    Glucose 122 (H) 65 - 99 mg/dL    BUN 18 8 - 23 mg/dL    Creatinine 0.97 0.76 - 1.27 mg/dL    Sodium 143 136 - 145 mmol/L    Potassium 4.5 3.5 - 5.2 mmol/L    Chloride 104 98 - 107 mmol/L    CO2 27.2 22.0 - 29.0 mmol/L    Calcium 9.3 8.6 - 10.5 mg/dL    Total Protein 6.7 6.0 - 8.5 g/dL    Albumin 4.00 3.50 - 5.20 g/dL    ALT (SGPT) 45 (H) 1 - 41 U/L    AST (SGOT) 70 (H) 1 - 40 U/L    Alkaline Phosphatase 66 39 - 117 U/L    Total Bilirubin 0.3 0.1 - 1.2 mg/dL    eGFR Non African Amer 76 >60 mL/min/1.73    Globulin 2.7 gm/dL    A/G Ratio 1.5 g/dL    BUN/Creatinine Ratio 18.6 7.0 - 25.0    Anion Gap 11.8 mmol/L   Troponin    Collection Time: 02/16/18  8:21 PM   Result Value Ref Range    Troponin T <0.010 0.000 - 0.030 ng/mL   Lipase    Collection Time: 02/16/18  8:21 PM   Result Value Ref Range    Lipase 32 13 - 60 U/L   CBC Auto Differential    Collection Time: 02/16/18  8:21 PM   Result Value Ref Range    WBC 5.60 4.50 - 10.70 10*3/mm3    RBC 4.59 (L) 4.60 - 6.00 10*6/mm3    Hemoglobin 13.1 (L) 13.7 - 17.6 g/dL    Hematocrit 41.1 40.4 - 52.2 %    MCV 89.5 79.8 - 96.2 fL    MCH 28.5 27.0 - 32.7 pg    MCHC 31.9 (L) 32.6 - 36.4 g/dL    RDW 13.7 11.5 - 14.5 %    RDW-SD 45.1 37.0 - 54.0 fl    MPV 10.6 6.0 - 12.0 fL    Platelets 186 140 - 500 10*3/mm3    Neutrophil % 59.4 42.7 - 76.0 %    Lymphocyte % 30.9 19.6 - 45.3 %    Monocyte % 7.7 5.0 - 12.0 %    Eosinophil % 1.8 0.3 - 6.2 %    Basophil % 0.2 0.0 - 1.5 %    Immature Grans % 0.0 0.0 - 0.5 %    Neutrophils, Absolute 3.33 1.90 - 8.10 10*3/mm3    Lymphocytes, Absolute 1.73 0.90 - 4.80 10*3/mm3    Monocytes, Absolute 0.43 0.20 - 1.20 10*3/mm3    Eosinophils, Absolute 0.10 0.00 -  0.70 10*3/mm3    Basophils, Absolute 0.01 0.00 - 0.20 10*3/mm3    Immature Grans, Absolute 0.00 0.00 - 0.03 10*3/mm3       Ordered the above labs and reviewed the results.        RADIOLOGY  CT Abdomen Pelvis With Contrast   Preliminary Result   1.  No definite acute abdominal pathology, no obstructive uropathy or   inflammatory bowel disease.    2.  Heterogeneous low-attenuation area in the posterior right hepatic   lobe measures 2.3 cm, this may be focal fatty infiltration, 3 month   follow-up is recommended.   3.  1 cm nonobstructing stone of the left kidney. Moderately large stool   in the colon please correlate with constipation.   4.  Post cholecystectomy with mild to moderate intra and extrahepatic   biliary ductal dilatation.   5.  0.8 cm low-attenuation focus of the upper pole spleen may be a   hemangioma, lymphangioma or a simple cyst, 3 month follow-up is   recommended.   6.  Mild dilatation of the right renal pelvis and ureter, no obstructing   calculus is seen, etiology may be a recently passed calculus or UTI   please clinically correlate.        Interpreted by radiologist. Independently viewed by me.                Ordered the above noted radiological studies. Reviewed by me in PACS.       PROCEDURES  Procedures    EKG:           EKG time: 20:23  Rhythm/Rate: NSR rate 60  P waves and KS: Normal P waves  QRS, axis: Normal QRS   ST and T waves: T wave inversion in III     Interpreted Contemporaneously by me, independently viewed  Unchanged compared to prior 09/11/17            PROGRESS AND CONSULTS  ED Course   Comment By Time   10:05 PM  Patient with abdominal pain that has resolved.  States he broke out into a sweat, got lightheaded and symptoms resolved here.  Ct abdomen negative.  Ambulates here without difficulty.  Discussed options with patient and he would like to go home.  Understands that we cannot test for all causes here but he had prodrome and workup here unremarkable. Charles Jackson MD  02/16 2208     8:10 PM:  Blood work, CT Abd, and EKG ordered for further evaluation. IV fluids ordered to hydrate pt.     9:40 PM:  Rechecked pt. Informed pt and family that pt's troponin is negative. CT Abd shows no acute intraabdominal abnormality. However, there is a low-attenuation area in the posterior right hepatic lobe and a low-attenuation focus of the upper pole spleen -> pt was advised to f/u with PMD for this within 3 months. Will ambulate pt in the ER to determine further course of care. Pt agrees with plan.    10:00 PM:  Per tech, pt ambulated in the ER independently without significant difficulty. Pt was offered admission to the hospital for further evaluation of pt's near-syncopal episode tonight. Pt declines admission and is requesting to go home. Pt was informed of the limitations of testing in the ER and he states that he understands these limitations. Pt understands that we are unable to rule out all possible causes of pt's near-syncopal episode.  Pt reports that he feels comfortable and agrees in being discharged home. Pt was advised to f/u with PMD. Strict RTER warnings given. Pt agrees with plan for discharge.               MEDICAL DECISION MAKING        MDM  Number of Diagnoses or Management Options  Epigastric pain:   Vasovagal syncope:      Amount and/or Complexity of Data Reviewed  Clinical lab tests: ordered and reviewed (Troponin is negative. )  Tests in the radiology section of CPT®: reviewed and ordered (CT Abd:  1.  No definite acute abdominal pathology, no obstructive uropathy or inflammatory bowel disease.   2.  Heterogeneous low-attenuation area in the posterior right hepatic lobe measures 2.3 cm, this may be focal fatty infiltration, 3 month follow-up is recommended.  3.  1 cm nonobstructing stone of the left kidney. Moderately large stool in the colon please correlate with constipation.  4.  Post cholecystectomy with mild to moderate intra and extrahepatic biliary ductal  dilatation.  5.  0.8 cm low-attenuation focus of the upper pole spleen may be a hemangioma, lymphangioma or a simple cyst, 3 month follow-up is recommended.  6.  Mild dilatation of the right renal pelvis and ureter, no obstructing calculus is seen, etiology may be a recently passed calculus or UTI please clinically correlate.)  Tests in the medicine section of CPT®: reviewed and ordered (EKG interpreted.   )    Patient Progress  Patient progress: stable             DIAGNOSIS  Final diagnoses:   Epigastric pain   Vasovagal near syncope         DISPOSITION  Pt discharged.      DISCHARGE    Patient discharged in stable condition.    Reviewed implications of results, diagnosis, meds, responsibility to follow up, warning signs and symptoms of possible worsening, potential complications and reasons to return to ER.    Patient/Family voiced understanding of above instructions.    Discussed plan for discharge, as there is no emergent indication for admission.  Pt/family is agreeable and understands need for follow up and repeat testing.  Pt is aware that discharge does not mean that nothing is wrong but it indicates no emergency is present that requires admission and they must continue care with follow-up as given below or physician of their choice.     FOLLOW-UP  Patricia Ferrara MD  [facility] [place] [address]  [phone]    Schedule an appointment as soon as possible for a visit                  Latest Documented Vital Signs:  As of 10:23 PM  BP- 153/82 HR- 73 Temp- 97.6 °F (36.4 °C) (Oral) O2 sat- 99%      --  Documentation assistance provided by niranjan Marcano for Dr. Manuel MD.  Information recorded by the scribe was done at my direction and has been verified and validated by me.               Yancy Marcano  02/16/18 [id number]       Charles Jakcson MD  02/16/18 1028

## 2018-02-20 ENCOUNTER — OFFICE VISIT (OUTPATIENT)
Dept: INTERNAL MEDICINE | Facility: CLINIC | Age: 73
End: 2018-02-20

## 2018-02-20 ENCOUNTER — TELEPHONE (OUTPATIENT)
Dept: SOCIAL WORK | Facility: HOSPITAL | Age: 73
End: 2018-02-20

## 2018-02-20 VITALS
HEART RATE: 67 BPM | DIASTOLIC BLOOD PRESSURE: 80 MMHG | BODY MASS INDEX: 26.46 KG/M2 | WEIGHT: 174 LBS | SYSTOLIC BLOOD PRESSURE: 122 MMHG | OXYGEN SATURATION: 97 %

## 2018-02-20 DIAGNOSIS — R55 NEAR SYNCOPE: Primary | ICD-10-CM

## 2018-02-20 DIAGNOSIS — R79.89 ABNORMAL LFTS (LIVER FUNCTION TESTS): ICD-10-CM

## 2018-02-20 DIAGNOSIS — I25.10 ATHEROSCLEROSIS OF NATIVE CORONARY ARTERY OF NATIVE HEART WITHOUT ANGINA PECTORIS: ICD-10-CM

## 2018-02-20 DIAGNOSIS — R93.2 ABNORMAL CT SCAN, LIVER: ICD-10-CM

## 2018-02-20 DIAGNOSIS — N40.1 BENIGN PROSTATIC HYPERPLASIA WITH INCOMPLETE BLADDER EMPTYING: ICD-10-CM

## 2018-02-20 DIAGNOSIS — R39.14 BENIGN PROSTATIC HYPERPLASIA WITH INCOMPLETE BLADDER EMPTYING: ICD-10-CM

## 2018-02-20 PROCEDURE — 99214 OFFICE O/P EST MOD 30 MIN: CPT | Performed by: INTERNAL MEDICINE

## 2018-02-20 RX ORDER — CARVEDILOL 3.12 MG/1
1.63 TABLET ORAL 2 TIMES DAILY WITH MEALS
Qty: 180 TABLET | Refills: 1
Start: 2018-02-20 | End: 2019-05-17 | Stop reason: DRUGHIGH

## 2018-02-20 NOTE — PROGRESS NOTES
Subjective   Chuck Zheng is a 72 y.o. male.     History of Present Illness   Chuck Zheng  presents for [facility] emergency department visit f/u. Patient had to seek treatment at the mentioned above facility for the abdominal pain and near syncopal episode while at [facility]. Symptoms started day of the visit. Chuck Zheng was diagnosed with low BP at 92 systolic. Tests and procedures done: CT scan and blood tests. Treatments: none IVFs. Patient was discharged home. Medication prescribed: none.  Patient reports that at home condition improved. Patient had been compliant with recommendations and medication. Had been very tired and sleepy lately.  Records reviewed and discussed with patient.    The following portions of the patient's history were reviewed and updated as appropriate: allergies, current medications, past family history, past medical history, past social history, past surgical history and problem list.    Review of Systems   Constitutional: Positive for chills and fatigue.   Respiratory: Negative for chest tightness and shortness of breath.    Cardiovascular: Negative for chest pain, palpitations and leg swelling.   Gastrointestinal: Negative for abdominal distention, abdominal pain and nausea.   Genitourinary: Negative for dysuria.   Musculoskeletal: Negative for back pain and joint swelling.   Neurological: Positive for syncope. Negative for numbness.   Psychiatric/Behavioral: Negative for behavioral problems and hallucinations.       Objective   Physical Exam   Constitutional: He is oriented to person, place, and time. He appears well-developed and well-nourished.   HENT:   Head: Normocephalic and atraumatic.   Right Ear: Tympanic membrane, external ear and ear canal normal.   Left Ear: Tympanic membrane, external ear and ear canal normal.   Nose: Nose normal. Right sinus exhibits no maxillary sinus tenderness and no frontal sinus tenderness. Left sinus exhibits no maxillary  sinus tenderness and no frontal sinus tenderness.   Mouth/Throat: Uvula is midline, oropharynx is clear and moist and mucous membranes are normal.   Eyes: Conjunctivae and EOM are normal. Pupils are equal, round, and reactive to light. Right eye exhibits no discharge. Left eye exhibits no discharge. No scleral icterus.   Neck: Neck supple. No JVD present.   Cardiovascular: Normal rate, regular rhythm and normal heart sounds.  Exam reveals no gallop and no friction rub.    No murmur heard.  Pulmonary/Chest: Effort normal and breath sounds normal. He has no wheezes. He has no rales.   Musculoskeletal: He exhibits no edema.   Lymphadenopathy:     He has no cervical adenopathy.   Neurological: He is alert and oriented to person, place, and time. No cranial nerve deficit.   Skin: Skin is warm and dry. No rash noted.   Psychiatric: He has a normal mood and affect. His behavior is normal.   Vitals reviewed.      Assessment/Plan   Diagnoses and all orders for this visit:    Near syncope    Near syncope - due to low BP. I believe that patient is not able to tolerate Uroxatral. Will discontinue this medication. Continue Carvedilol at half a pill twice a day.  Nocturia - as we have to discontinue Uroxatral, and not able to use leonor blockers, will attempt to try Myrbetriq 25 mg at bedtime.  Abnormal LFTs with focal fatty infiltration of the liver on the CT scan - will follow LFTs and repeat CT scan in 3 months.  Nocturia - will try Myrbetriq 25 mg a day.

## 2018-02-20 NOTE — PATIENT INSTRUCTIONS
Near syncope - due to low BP. I believe that patient is not able to tolerate Uroxatral. Will discontinue this medication. Continue Carvedilol at half a pill twice a day.  Nocturia - as we have to discontinue Uroxatral, and not able to use leonor blockers, will attempt to try Myrbetriq 25 mg at bedtime.  Abnormal LFTs with focal fatty infiltration of the liver on the CT scan - will follow LFTs and repeat CT scan in 3 months.  Nocturia - will try Myrbetriq 25 mg a day.

## 2018-03-20 ENCOUNTER — LAB (OUTPATIENT)
Dept: INTERNAL MEDICINE | Facility: CLINIC | Age: 73
End: 2018-03-20

## 2018-03-20 DIAGNOSIS — R79.89 ABNORMAL LFTS (LIVER FUNCTION TESTS): ICD-10-CM

## 2018-03-20 LAB
ALBUMIN SERPL-MCNC: 4.6 G/DL (ref 3.5–5.2)
ALBUMIN/GLOB SERPL: 2.2 G/DL
ALP SERPL-CCNC: 66 U/L (ref 39–117)
ALT SERPL-CCNC: 23 U/L (ref 1–41)
AST SERPL-CCNC: 19 U/L (ref 1–40)
BILIRUB SERPL-MCNC: 0.2 MG/DL (ref 0.1–1.2)
BUN SERPL-MCNC: 15 MG/DL (ref 8–23)
BUN/CREAT SERPL: 13.4 (ref 7–25)
CALCIUM SERPL-MCNC: 9.7 MG/DL (ref 8.6–10.5)
CHLORIDE SERPL-SCNC: 105 MMOL/L (ref 98–107)
CO2 SERPL-SCNC: 28.8 MMOL/L (ref 22–29)
CREAT SERPL-MCNC: 1.12 MG/DL (ref 0.76–1.27)
GLOBULIN SER CALC-MCNC: 2.1 GM/DL
GLUCOSE SERPL-MCNC: 95 MG/DL (ref 65–99)
POTASSIUM SERPL-SCNC: 4.4 MMOL/L (ref 3.5–5.2)
PROT SERPL-MCNC: 6.7 G/DL (ref 6–8.5)
SODIUM SERPL-SCNC: 145 MMOL/L (ref 136–145)

## 2018-03-23 ENCOUNTER — TELEPHONE (OUTPATIENT)
Dept: INTERNAL MEDICINE | Facility: CLINIC | Age: 73
End: 2018-03-23

## 2018-03-23 NOTE — TELEPHONE ENCOUNTER
----- Message from Shelby Senior sent at 3/23/2018 10:21 AM EDT -----  Contact: pt  Pt is calling regarding labs on 3/20, would like to know results.    Please advise.  Pt# [id number]

## 2018-05-03 ENCOUNTER — OFFICE VISIT (OUTPATIENT)
Dept: SLEEP MEDICINE | Facility: HOSPITAL | Age: 73
End: 2018-05-03
Attending: INTERNAL MEDICINE

## 2018-05-03 ENCOUNTER — DOCUMENTATION (OUTPATIENT)
Dept: SLEEP MEDICINE | Facility: HOSPITAL | Age: 73
End: 2018-05-03

## 2018-05-03 DIAGNOSIS — Z99.89 OSA ON CPAP: Primary | ICD-10-CM

## 2018-05-03 DIAGNOSIS — G47.33 OSA ON CPAP: Primary | ICD-10-CM

## 2018-05-03 PROCEDURE — 99213 OFFICE O/P EST LOW 20 MIN: CPT | Performed by: INTERNAL MEDICINE

## 2018-05-03 PROCEDURE — G0463 HOSPITAL OUTPT CLINIC VISIT: HCPCS

## 2018-05-03 NOTE — PROGRESS NOTES
Pt seen in sleep clinic today with Dr. Call. Per Dr. Call askd to change patients Travel cpap setting from 13cm to new setting of 11.5cm. Changes made on patients device in sleep lab while here. MAB

## 2018-05-03 NOTE — PROGRESS NOTES
Follow Up [facility] Note       Patient Care Team:  Patricia Ferrara MD as PCP - Internal Medicine  Kaden Call MD as Consulting Physician (Sleep Medicine)    Chief Complaint:  PLACIDO     Interval History:   The patient was last seen by me in April 2017.  He is stable and unchanged.  He goes to bed 11 PM awakens at 8 AM.  He awakens once for the bathroom.  [place] Sleepiness Scale is normal at 4.    The patient is questioning if he has had reactions to his straps.  Alternatively, it may be razor burns aggravated by the straps?    Review of Systems:  Recorded on the Sleep Questionnaire.  Unremarkable .    Social History:  3 cups of coffee a day  Social History     Social History   • Marital status:      Social History Main Topics   • Smoking status: Never Smoker   • Alcohol use Yes      Comment: 7 drinks weekly   • Drug use: Unknown     Other Topics Concern   • Not on file       Allergies:  Ace inhibitors; Beta adrenergic blockers; Cephalexin; and Sulfa antibiotics     Medication Review:  Reviewed.      Vital Signs:  Height 67 inches, weight 169 pounds and BMI overweight at 26-27.    Physical Exam:    Constitutional:  Well developed white male and appears in no apparent distress.  Awake & oriented times 3.  Normal mood with normal recent and remote memory and normal judgement.  Eyes:  Conjunctivae normal.  Oropharynx:  moist mucous membranes without exudate and a normal sized tongue and previous UPPP and mild narrowing of the posterior pharyngeal opening and class II-III MP airway      Results Review:  DME is [employer] and he uses a fullface mask.  Downloads between November 3 and May 1, 2018 compliances 86%.  The patient uses his Z1 CPAP the other times.  Average usage is 8 hours and 39 minutes.  Average AHI is normal without leak.  CPAP pressure is +11.5.       Impression:   Obstructive sleep apnea adequately treated with CPAP with good compliance and usage and no complaints of  hypersomnolence.      Plan:  Good sleep hygiene measures should be maintained.  Weight loss would be beneficial in this patient who is overweight by BMI.  The patient is benefiting from the treatment being provided.     The patient will continue CPAP.  The patient's Z1 CPAP was adjusted to +11.5.  A new prescription will be sent to his DME for all needed supplies    The patient will call for any problems and will follow up in one year.      Kaden Call MD  Sleep Medicine  05/06/18  5:16 PM

## 2018-05-07 ENCOUNTER — LAB (OUTPATIENT)
Dept: INTERNAL MEDICINE | Facility: CLINIC | Age: 73
End: 2018-05-07

## 2018-05-07 DIAGNOSIS — E53.8 COBALAMIN DEFICIENCY: ICD-10-CM

## 2018-05-07 DIAGNOSIS — E78.00 PURE HYPERCHOLESTEROLEMIA: ICD-10-CM

## 2018-05-07 LAB
ALBUMIN SERPL-MCNC: 3.9 G/DL (ref 3.5–5.2)
ALBUMIN/GLOB SERPL: 1.5 G/DL
ALP SERPL-CCNC: 63 U/L (ref 39–117)
ALT SERPL W P-5'-P-CCNC: 17 U/L (ref 1–41)
ANION GAP SERPL CALCULATED.3IONS-SCNC: 11.1 MMOL/L
AST SERPL-CCNC: 19 U/L (ref 1–40)
BILIRUB SERPL-MCNC: 0.3 MG/DL (ref 0.1–1.2)
BUN BLD-MCNC: 15 MG/DL (ref 8–23)
BUN/CREAT SERPL: 15.3 (ref 7–25)
CALCIUM SPEC-SCNC: 8.8 MG/DL (ref 8.6–10.5)
CHLORIDE SERPL-SCNC: 106 MMOL/L (ref 98–107)
CHOLEST SERPL-MCNC: 145 MG/DL (ref 0–200)
CO2 SERPL-SCNC: 27.9 MMOL/L (ref 22–29)
CREAT BLD-MCNC: 0.98 MG/DL (ref 0.76–1.27)
GFR SERPL CREATININE-BSD FRML MDRD: 75 ML/MIN/1.73
GLOBULIN UR ELPH-MCNC: 2.6 GM/DL
GLUCOSE BLD-MCNC: 100 MG/DL (ref 65–99)
HDLC SERPL-MCNC: 54 MG/DL (ref 40–60)
LDLC SERPL CALC-MCNC: 70 MG/DL (ref 0–100)
LDLC/HDLC SERPL: 1.29 {RATIO}
POTASSIUM BLD-SCNC: 4.2 MMOL/L (ref 3.5–5.2)
PROT SERPL-MCNC: 6.5 G/DL (ref 6–8.5)
SODIUM BLD-SCNC: 145 MMOL/L (ref 136–145)
TRIGL SERPL-MCNC: 106 MG/DL (ref 0–150)
VIT B12 SERPL-MCNC: 353 PG/ML (ref 211–946)
VLDLC SERPL-MCNC: 21.2 MG/DL (ref 5–40)

## 2018-05-07 PROCEDURE — 80053 COMPREHEN METABOLIC PANEL: CPT | Performed by: INTERNAL MEDICINE

## 2018-05-07 PROCEDURE — 80061 LIPID PANEL: CPT | Performed by: INTERNAL MEDICINE

## 2018-05-14 ENCOUNTER — OFFICE VISIT (OUTPATIENT)
Dept: INTERNAL MEDICINE | Facility: CLINIC | Age: 73
End: 2018-05-14

## 2018-05-14 VITALS
RESPIRATION RATE: 14 BRPM | DIASTOLIC BLOOD PRESSURE: 74 MMHG | WEIGHT: 171 LBS | HEIGHT: 68 IN | SYSTOLIC BLOOD PRESSURE: 122 MMHG | BODY MASS INDEX: 25.91 KG/M2

## 2018-05-14 DIAGNOSIS — E78.00 PURE HYPERCHOLESTEROLEMIA: ICD-10-CM

## 2018-05-14 DIAGNOSIS — N40.1 BENIGN PROSTATIC HYPERPLASIA WITH INCOMPLETE BLADDER EMPTYING: ICD-10-CM

## 2018-05-14 DIAGNOSIS — R39.14 BENIGN PROSTATIC HYPERPLASIA WITH INCOMPLETE BLADDER EMPTYING: ICD-10-CM

## 2018-05-14 DIAGNOSIS — E53.8 COBALAMIN DEFICIENCY: Primary | ICD-10-CM

## 2018-05-14 DIAGNOSIS — Z00.00 HEALTH CARE MAINTENANCE: ICD-10-CM

## 2018-05-14 PROBLEM — R79.89 ABNORMAL LFTS (LIVER FUNCTION TESTS): Status: RESOLVED | Noted: 2018-02-20 | Resolved: 2018-05-14

## 2018-05-14 PROBLEM — R55 NEAR SYNCOPE: Status: RESOLVED | Noted: 2018-02-20 | Resolved: 2018-05-14

## 2018-05-14 PROCEDURE — 99213 OFFICE O/P EST LOW 20 MIN: CPT | Performed by: INTERNAL MEDICINE

## 2018-05-14 NOTE — PROGRESS NOTES
"Subjective   Chuck Zheng is a 72 y.o. male. Here for hyperlipidemia and Vit B12 deficiency f/u.    History of Present Illness     /74 (BP Location: Left arm, Patient Position: Sitting, Cuff Size: Adult)   Resp 14   Ht 172.7 cm (68\")   Wt 77.6 kg (171 lb)   BMI 26.00 kg/m²     Current Outpatient Prescriptions:   •  aspirin (ASPIR) 81 MG EC tablet, Take  by mouth., Disp: , Rfl:   •  carvedilol (COREG) 3.125 MG tablet, Take 0.5 tablets by mouth 2 (Two) Times a Day With Meals. (Patient taking differently: Take 3.125 mg by mouth 2 (Two) Times a Day With Meals.), Disp: 180 tablet, Rfl: 1  •  Cetirizine HCl (ZYRTEC ALLERGY) 10 MG capsule, Take  by mouth., Disp: , Rfl:   •  Cholecalciferol (VITAMIN D3) 1000 UNIT/SPRAY liquid, Take  by mouth., Disp: , Rfl:   •  Cyanocobalamin (VITAMIN B 12) 100 MCG lozenge, Take  by mouth., Disp: , Rfl:   •  Mirabegron ER (MYRBETRIQ) 25 MG tablet sustained-release 24 hour 24 hr tablet, Take 1 tablet by mouth Daily., Disp: 90 tablet, Rfl: 3  •  Multiple Vitamin (MULTI VITAMIN DAILY) tablet, Take  by mouth., Disp: , Rfl:   •  Omega-3 Fatty Acids (FISH OIL DOUBLE STRENGTH) 1200 MG capsule, Take  by mouth., Disp: , Rfl:   •  rosuvastatin (CRESTOR) 40 MG tablet, Take 1 tablet by mouth Daily., Disp: 90 tablet, Rfl: 3    Patient has been diagnosed with hyperlipidemia. Target LDL is < 70 mg/dl (\"very high\" risk for CHD). Patient is on low fat diet with good compliance. Patient is compliant with treatment: daily use of Crestor (rozuvastatin). Side effects of medication: none. Exercise walking.He just got home from hiking in [place], where he had covered 200 mi - no chest pain/pressure/discomfort.    Patient had been diagnosed with Vitamin B12 deficiency. Takes OTC Vit B12 ans also MVIs and group B MVIs.. Patient has complaints of muscle or bone aches. Balance is good. No recent falls.     The following portions of the patient's history were reviewed and updated as appropriate: " allergies, current medications, past family history, past medical history, past social history, past surgical history and problem list.    Review of Systems   Constitutional: Negative for chills and fever.   Eyes: Negative for pain and redness.   Respiratory: Negative for cough and shortness of breath.    Cardiovascular: Negative for chest pain and leg swelling.   Neurological: Negative for dizziness and headaches.       Objective   Physical Exam   Constitutional: He is oriented to person, place, and time. He appears well-developed and well-nourished.   HENT:   Head: Normocephalic and atraumatic.   Right Ear: Tympanic membrane, external ear and ear canal normal.   Left Ear: Tympanic membrane, external ear and ear canal normal.   Nose: Nose normal. Right sinus exhibits no maxillary sinus tenderness and no frontal sinus tenderness. Left sinus exhibits no maxillary sinus tenderness and no frontal sinus tenderness.   Mouth/Throat: Uvula is midline, oropharynx is clear and moist and mucous membranes are normal.   Eyes: Conjunctivae and EOM are normal. Pupils are equal, round, and reactive to light. Right eye exhibits no discharge. Left eye exhibits no discharge. No scleral icterus.   Neck: Neck supple. No JVD present.   Cardiovascular: Normal rate, regular rhythm and normal heart sounds.  Exam reveals no gallop and no friction rub.    No murmur heard.  Pulmonary/Chest: Effort normal and breath sounds normal. He has no wheezes. He has no rales.   Musculoskeletal: He exhibits no edema.   Lymphadenopathy:     He has no cervical adenopathy.   Neurological: He is alert and oriented to person, place, and time. No cranial nerve deficit.   Skin: Skin is warm and dry. No rash noted.   Psychiatric: He has a normal mood and affect. His behavior is normal.   Vitals reviewed.      Assessment/Plan   Chuck was seen today for hyperlipidemia.    Diagnoses and all orders for this visit:    Cobalamin deficiency  -     Vitamin B12;  "Future    Benign prostatic hyperplasia with incomplete bladder emptying  -     Mirabegron ER (MYRBETRIQ) 25 MG tablet sustained-release 24 hour 24 hr tablet; Take 1 tablet by mouth Daily.    Pure hypercholesterolemia    Health care maintenance  -     Comprehensive Metabolic Panel; Future  -     CBC & Differential; Future  -     Lipid Panel; Future  -     TSH; Future  -     PSA Screen; Future  -     Urinalysis With / Microscopic If Indicated - Urine, Clean Catch; Future      Hyperlipidemia - well controlled with current  medication and low fat diet. Target LDL is  < 70 mg/dl (\"very high\" risk for CHD). Patient's 71. Normal liver function tests. Continue same medication and diet.Regular exercise recommended.   Vit B12 deficiency - well compensated with OTC supplement, will continue same.          "

## 2018-06-14 ENCOUNTER — HOSPITAL ENCOUNTER (OUTPATIENT)
Dept: SLEEP MEDICINE | Facility: HOSPITAL | Age: 73
Discharge: HOME | End: 2018-06-14

## 2018-06-15 NOTE — PROGRESS NOTES
Patient arrived for Sleep Education and mask fit.  He reports that twice in the last 4 weeks he has broken out in a raised, red, rash on the sides of his face and back of neck.  He visited his dermatologist who suggested he might be having sensitivity to the headgear of his CPAP mask. Pt. Applied mask in lab and it is determined that he was wearing his mask and headgear overly taught. Tech educated patient about proper mask fit of Quattro Air.  Since pt was tightening mask to avoid micro leaks, pt was sized and fit for Air Fit/ Air Touch F20 Medium, and Dreamwear FFM Medium with medium headgear.  Pt checked out Dreamwear for 7days.  Pt will return and exchange for Air Fit F20 Medium if he determines that DreamWear is not as conducive to good seal in practice. All patient questions answered to his satisfaction. max

## 2018-07-06 DIAGNOSIS — E78.00 PURE HYPERCHOLESTEROLEMIA: ICD-10-CM

## 2018-07-09 RX ORDER — ROSUVASTATIN CALCIUM 40 MG/1
40 TABLET, COATED ORAL DAILY
Qty: 90 TABLET | Refills: 0 | Status: SHIPPED | OUTPATIENT
Start: 2018-07-09 | End: 2018-12-18 | Stop reason: SDUPTHER

## 2018-08-03 DIAGNOSIS — I25.10 ATHEROSCLEROSIS OF NATIVE CORONARY ARTERY OF NATIVE HEART WITHOUT ANGINA PECTORIS: ICD-10-CM

## 2018-08-07 RX ORDER — CARVEDILOL 3.12 MG/1
TABLET ORAL
Qty: 180 TABLET | Refills: 0 | Status: SHIPPED | OUTPATIENT
Start: 2018-08-07 | End: 2018-11-03 | Stop reason: SDUPTHER

## 2018-11-03 DIAGNOSIS — I25.10 ATHEROSCLEROSIS OF NATIVE CORONARY ARTERY OF NATIVE HEART WITHOUT ANGINA PECTORIS: ICD-10-CM

## 2018-11-05 RX ORDER — CARVEDILOL 3.12 MG/1
TABLET ORAL
Qty: 180 TABLET | Refills: 1 | Status: SHIPPED | OUTPATIENT
Start: 2018-11-05 | End: 2019-02-07

## 2018-12-11 ENCOUNTER — LAB (OUTPATIENT)
Dept: INTERNAL MEDICINE | Facility: CLINIC | Age: 73
End: 2018-12-11

## 2018-12-11 DIAGNOSIS — Z00.00 HEALTH CARE MAINTENANCE: ICD-10-CM

## 2018-12-11 DIAGNOSIS — E53.8 COBALAMIN DEFICIENCY: ICD-10-CM

## 2018-12-11 LAB
ALBUMIN SERPL-MCNC: 4.3 G/DL (ref 3.5–5.2)
ALBUMIN/GLOB SERPL: 1.8 G/DL
ALP SERPL-CCNC: 56 U/L (ref 39–117)
ALT SERPL W P-5'-P-CCNC: 25 U/L (ref 1–41)
ANION GAP SERPL CALCULATED.3IONS-SCNC: 8.5 MMOL/L
AST SERPL-CCNC: 20 U/L (ref 1–40)
BASOPHILS # BLD AUTO: 0.01 10*3/MM3 (ref 0–0.2)
BASOPHILS NFR BLD AUTO: 0.2 % (ref 0–2)
BILIRUB SERPL-MCNC: 0.6 MG/DL (ref 0.1–1.2)
BILIRUB UR QL STRIP: NEGATIVE
BUN BLD-MCNC: 21 MG/DL (ref 8–23)
BUN/CREAT SERPL: 21.2 (ref 7–25)
CALCIUM SPEC-SCNC: 9.6 MG/DL (ref 8.6–10.5)
CHLORIDE SERPL-SCNC: 105 MMOL/L (ref 98–107)
CHOLEST SERPL-MCNC: 169 MG/DL (ref 0–200)
CLARITY UR: CLEAR
CO2 SERPL-SCNC: 29.5 MMOL/L (ref 22–29)
COLOR UR: YELLOW
CREAT BLD-MCNC: 0.99 MG/DL (ref 0.76–1.27)
DEPRECATED RDW RBC AUTO: 43.1 FL (ref 37–54)
EOSINOPHIL # BLD AUTO: 0.08 10*3/MM3 (ref 0–0.7)
EOSINOPHIL NFR BLD AUTO: 1.7 % (ref 0–5)
ERYTHROCYTE [DISTWIDTH] IN BLOOD BY AUTOMATED COUNT: 13.6 % (ref 11.5–15)
GFR SERPL CREATININE-BSD FRML MDRD: 75 ML/MIN/1.73
GLOBULIN UR ELPH-MCNC: 2.4 GM/DL
GLUCOSE BLD-MCNC: 109 MG/DL (ref 65–99)
GLUCOSE UR STRIP-MCNC: NEGATIVE MG/DL
HCT VFR BLD AUTO: 41.1 % (ref 40.1–51)
HDLC SERPL-MCNC: 51 MG/DL (ref 40–60)
HGB BLD-MCNC: 13.5 G/DL (ref 13.7–17.5)
HGB UR QL STRIP.AUTO: NEGATIVE
KETONES UR QL STRIP: NEGATIVE
LDLC SERPL CALC-MCNC: 90 MG/DL (ref 0–100)
LDLC/HDLC SERPL: 1.76 {RATIO}
LEUKOCYTE ESTERASE UR QL STRIP.AUTO: NEGATIVE
LYMPHOCYTES # BLD AUTO: 1.11 10*3/MM3 (ref 0.8–7)
LYMPHOCYTES NFR BLD AUTO: 23.6 % (ref 10–60)
MCH RBC QN AUTO: 28.9 PG (ref 26–34)
MCHC RBC AUTO-ENTMCNC: 32.8 G/DL (ref 31–37)
MCV RBC AUTO: 88 FL (ref 80–100)
MONOCYTES # BLD AUTO: 0.52 10*3/MM3 (ref 0–1)
MONOCYTES NFR BLD AUTO: 11 % (ref 0–13)
NEUTROPHILS # BLD AUTO: 2.99 10*3/MM3 (ref 1–11)
NEUTROPHILS NFR BLD AUTO: 63.5 % (ref 30–85)
NITRITE UR QL STRIP: NEGATIVE
PH UR STRIP.AUTO: 5.5 [PH] (ref 5–8)
PLATELET # BLD AUTO: 182 10*3/MM3 (ref 150–450)
PMV BLD AUTO: 10.7 FL (ref 6–12)
POTASSIUM BLD-SCNC: 4.5 MMOL/L (ref 3.5–5.2)
PROT SERPL-MCNC: 6.7 G/DL (ref 6–8.5)
PROT UR QL STRIP: NEGATIVE
PSA SERPL-MCNC: 4.91 NG/ML (ref 0–4)
RBC # BLD AUTO: 4.67 10*6/MM3 (ref 4.63–6.08)
SODIUM BLD-SCNC: 143 MMOL/L (ref 136–145)
SP GR UR STRIP: >=1.03 (ref 1–1.03)
TRIGL SERPL-MCNC: 140 MG/DL (ref 0–150)
TSH SERPL DL<=0.05 MIU/L-ACNC: 1.34 MIU/ML (ref 0.27–4.2)
UROBILINOGEN UR QL STRIP: NORMAL
VIT B12 BLD-MCNC: 564 PG/ML (ref 211–946)
VLDLC SERPL-MCNC: 28 MG/DL (ref 5–40)
WBC NRBC COR # BLD: 4.71 10*3/MM3 (ref 5–10)

## 2018-12-11 PROCEDURE — 36415 COLL VENOUS BLD VENIPUNCTURE: CPT | Performed by: INTERNAL MEDICINE

## 2018-12-11 PROCEDURE — 84443 ASSAY THYROID STIM HORMONE: CPT | Performed by: INTERNAL MEDICINE

## 2018-12-11 PROCEDURE — G0103 PSA SCREENING: HCPCS | Performed by: INTERNAL MEDICINE

## 2018-12-11 PROCEDURE — 81003 URINALYSIS AUTO W/O SCOPE: CPT | Performed by: INTERNAL MEDICINE

## 2018-12-11 PROCEDURE — 85025 COMPLETE CBC W/AUTO DIFF WBC: CPT | Performed by: INTERNAL MEDICINE

## 2018-12-11 PROCEDURE — 80061 LIPID PANEL: CPT | Performed by: INTERNAL MEDICINE

## 2018-12-11 PROCEDURE — 80053 COMPREHEN METABOLIC PANEL: CPT | Performed by: INTERNAL MEDICINE

## 2018-12-11 PROCEDURE — 82607 VITAMIN B-12: CPT | Performed by: INTERNAL MEDICINE

## 2018-12-18 ENCOUNTER — OFFICE VISIT (OUTPATIENT)
Dept: INTERNAL MEDICINE | Facility: CLINIC | Age: 73
End: 2018-12-18

## 2018-12-18 ENCOUNTER — TELEPHONE (OUTPATIENT)
Dept: INTERNAL MEDICINE | Facility: CLINIC | Age: 73
End: 2018-12-18

## 2018-12-18 VITALS
WEIGHT: 174 LBS | SYSTOLIC BLOOD PRESSURE: 124 MMHG | DIASTOLIC BLOOD PRESSURE: 72 MMHG | BODY MASS INDEX: 26.37 KG/M2 | RESPIRATION RATE: 14 BRPM | HEIGHT: 68 IN

## 2018-12-18 DIAGNOSIS — R39.14 BENIGN PROSTATIC HYPERPLASIA WITH INCOMPLETE BLADDER EMPTYING: ICD-10-CM

## 2018-12-18 DIAGNOSIS — R97.20 ELEVATED PSA, LESS THAN 10 NG/ML: ICD-10-CM

## 2018-12-18 DIAGNOSIS — N40.1 BENIGN PROSTATIC HYPERPLASIA WITH INCOMPLETE BLADDER EMPTYING: ICD-10-CM

## 2018-12-18 DIAGNOSIS — Z00.00 HEALTH CARE MAINTENANCE: Primary | ICD-10-CM

## 2018-12-18 DIAGNOSIS — E78.00 PURE HYPERCHOLESTEROLEMIA: ICD-10-CM

## 2018-12-18 PROBLEM — K76.0 FATTY LIVER: Status: ACTIVE | Noted: 2018-02-20

## 2018-12-18 PROCEDURE — 99397 PER PM REEVAL EST PAT 65+ YR: CPT | Performed by: INTERNAL MEDICINE

## 2018-12-18 RX ORDER — ROSUVASTATIN CALCIUM 40 MG/1
40 TABLET, COATED ORAL DAILY
Qty: 90 TABLET | Refills: 3 | Status: SHIPPED | OUTPATIENT
Start: 2018-12-18 | End: 2019-12-26

## 2018-12-18 RX ORDER — FEXOFENADINE HCL 180 MG/1
180 TABLET ORAL EVERY MORNING
COMMUNITY
End: 2021-12-06

## 2018-12-18 RX ORDER — VITAMIN B COMPLEX
1 CAPSULE ORAL DAILY
COMMUNITY
End: 2021-12-18 | Stop reason: HOSPADM

## 2018-12-18 NOTE — PROGRESS NOTES
"Subjective   Chuck Zheng is a 73 y.o. male.     History of Present Illness   /72 (BP Location: Left arm, Patient Position: Sitting, Cuff Size: Adult)   Resp 14   Ht 172.7 cm (68\")   Wt 78.9 kg (174 lb)   BMI 26.46 kg/m²   Patient is here for yearly CPE. Last CPE was 1 year ago.  PMH, SH and FH reviewed. Changes in the FH: none.  Patient rates his health as good.  Tobacco use: none.  Alcohol use: 2-3 days a week.  Recreational drugs use: none.  Medication list rewieved.  Diet: healthy heart.  Patient exercises  used to exercise regularly, but lately stopped exercise due to pain. Had knee sprain, also does not have time.  Marital status: .  Employment: full time.  Patient rates his stress level as moderate.  Dental health: good. Brushes teeth 2 times a day, flosses once a day. Dental visits 2 times a year.  Vision correction: reading glasses.  Hearing: normal.    Recent vaccinations:   flu   is up to date and recommended yearly  Tdap  is up to date  Shingles prevention  Zostavax completed, recommended to get new shingles vaccine Shindrix at [facility], benefits explained.      Current Outpatient Medications:   •  aspirin (ASPIR) 81 MG EC tablet, Take  by mouth., Disp: , Rfl:   •  B Complex Vitamins (VITAMIN B COMPLEX) capsule capsule, Take  by mouth Daily., Disp: , Rfl:   •  carvedilol (COREG) 3.125 MG tablet, Take 0.5 tablets by mouth 2 (Two) Times a Day With Meals. (Patient taking differently: Take 3.125 mg by mouth 2 (Two) Times a Day With Meals.), Disp: 180 tablet, Rfl: 1  •  Cholecalciferol (VITAMIN D3) 1000 UNIT/SPRAY liquid, Take  by mouth., Disp: , Rfl:   •  Cyanocobalamin (VITAMIN B 12) 100 MCG lozenge, Take  by mouth. 500 mg daily, Disp: , Rfl:   •  fexofenadine (ALLEGRA) 180 MG tablet, Take 180 mg by mouth Daily., Disp: , Rfl:   •  Mirabegron ER (MYRBETRIQ) 25 MG tablet sustained-release 24 hour 24 hr tablet, Take 1 tablet by mouth Daily., Disp: 90 tablet, Rfl: 3  •  Misc Natural " Products (OSTEO BI-FLEX ADV JOINT SHIELD PO), Take  by mouth., Disp: , Rfl:   •  Multiple Vitamin (MULTI VITAMIN DAILY) tablet, Take  by mouth., Disp: , Rfl:   •  Omega-3 Fatty Acids (FISH OIL DOUBLE STRENGTH) 1200 MG capsule, Take  by mouth., Disp: , Rfl:   •  carvedilol (COREG) 3.125 MG tablet, TAKE 1 TABLET BY MOUTH TWICE DAILY WITH MEALS, Disp: 180 tablet, Rfl: 1  •  Cetirizine HCl (ZYRTEC ALLERGY) 10 MG capsule, Take  by mouth., Disp: , Rfl:   •  rosuvastatin (CRESTOR) 40 MG tablet, TAKE 1 TABLET BY MOUTH DAILY (Patient taking differently: Take 20 mg by mouth Daily.), Disp: 90 tablet, Rfl: 0         The following portions of the patient's history were reviewed and updated as appropriate: allergies, current medications, past family history, past medical history, past social history, past surgical history and problem list.    Review of Systems   Constitutional: Negative for chills and fever.   HENT: Negative for postnasal drip, sinus pressure and sore throat.    Eyes: Negative for pain and itching.   Respiratory: Negative for cough and chest tightness.    Cardiovascular: Positive for leg swelling. Negative for chest pain.   Gastrointestinal: Negative for abdominal pain and blood in stool.   Endocrine: Negative for cold intolerance and heat intolerance.   Genitourinary: Negative for difficulty urinating and flank pain.   Musculoskeletal: Negative for back pain and neck pain.   Skin: Negative for color change and rash.   Neurological: Negative for dizziness, weakness and headaches.   Hematological: Negative for adenopathy. Does not bruise/bleed easily.   Psychiatric/Behavioral: Negative for sleep disturbance. The patient is not nervous/anxious.        Objective   Physical Exam   Constitutional: He is oriented to person, place, and time. Vital signs are normal. He appears well-developed and well-nourished. No distress.   HENT:   Head: Normocephalic and atraumatic.   Right Ear: External ear normal.   Left Ear:  External ear normal.   Nose: Nose normal. No mucosal edema. Right sinus exhibits no maxillary sinus tenderness and no frontal sinus tenderness. Left sinus exhibits no maxillary sinus tenderness and no frontal sinus tenderness.   Mouth/Throat: Oropharynx is clear and moist. No oropharyngeal exudate.   Eyes: Conjunctivae, EOM and lids are normal. Pupils are equal, round, and reactive to light. Right eye exhibits no discharge. Left eye exhibits no discharge. Right conjunctiva is not injected. Left conjunctiva is not injected. No scleral icterus. Right eye exhibits normal extraocular motion. Left eye exhibits normal extraocular motion.   Neck: Normal range of motion and full passive range of motion without pain. Neck supple. No JVD present. Carotid bruit is not present. No thyromegaly present.   Cardiovascular: Normal rate, regular rhythm, S1 normal, S2 normal, normal heart sounds and intact distal pulses. PMI is not displaced. Exam reveals no gallop and no friction rub.   No murmur heard.  Pulmonary/Chest: Effort normal and breath sounds normal. No accessory muscle usage. No respiratory distress. He has no decreased breath sounds. He has no wheezes. He has no rhonchi. He has no rales.   Abdominal: Soft. Bowel sounds are normal. He exhibits no distension, no pulsatile liver, no fluid wave, no abdominal bruit, no ascites and no mass. There is no tenderness. There is no rebound and no guarding.   Genitourinary: Rectal exam shows no external hemorrhoid, no internal hemorrhoid, no fissure, no mass, no tenderness and anal tone normal. Prostate is enlarged. Prostate is not tender.   Musculoskeletal: He exhibits no edema or deformity.   Lymphadenopathy:        Head (right side): No submental, no submandibular, no preauricular, no posterior auricular and no occipital adenopathy present.        Head (left side): No submental, no submandibular, no tonsillar, no preauricular, no posterior auricular and no occipital adenopathy  present.     He has no cervical adenopathy.        Right cervical: No superficial cervical, no deep cervical and no posterior cervical adenopathy present.       Left cervical: No superficial cervical, no deep cervical and no posterior cervical adenopathy present.        Right: No supraclavicular adenopathy present.        Left: No supraclavicular adenopathy present.   Neurological: He is alert and oriented to person, place, and time. He has normal strength and normal reflexes. He displays normal reflexes. No cranial nerve deficit. He exhibits normal muscle tone. Coordination normal.   Reflex Scores:       Bicep reflexes are 2+ on the right side and 2+ on the left side.       Patellar reflexes are 2+ on the right side and 2+ on the left side.  Skin: Skin is warm and dry. No rash noted. He is not diaphoretic. No erythema.   Dark discoloration outer corner of the right thumb nail, some cherry hemangiomas on the torso   Psychiatric: He has a normal mood and affect. His speech is normal and behavior is normal. Thought content normal.   Vitals reviewed.      Assessment/Plan   Chuck was seen today for annual exam.    Diagnoses and all orders for this visit:    Health care maintenance    Pure hypercholesterolemia  -     Comprehensive Metabolic Panel; Future  -     Lipid Panel; Future  -     rosuvastatin (CRESTOR) 40 MG tablet; Take 1 tablet by mouth Daily.    Elevated PSA, less than 10 ng/ml  -     PSA Screen; Future    Benign prostatic hyperplasia with incomplete bladder emptying  -     Mirabegron ER (MYRBETRIQ) 50 MG tablet sustained-release 24 hour 24 hr tablet; Take 50 mg by mouth Daily.        Risk evaluation:  1. Cardiovascular risk factors: patient has CAD and is being treated.  2. Diabetes risk factors: none.   3. Cancer risk factors: FH of colon polyps.  4. Risky behavior: none. Use of seat belts: regular. Use of sunscreens: regular. SPF 50.   Tattoos: none.   H/o blood transfusion/organ transplant before 1992 or  "clotting factors transfusion before 1987: none.    Prevention:   Cholesterol test up to date. Cholesterol is mildly elevated. You need to be more careful with your diet and restart exercise as soon as possioble..  Blood sugar test up to date. Fasting blood sugar is mildly elevated. Recommended to avoid sweets and starches: pasta, pizza, bread, potato, corn. .  Hep C testing (for those born [birth year]68-6477): completed..  Colonoscopy up to date. Recommended every 10 years. Next screening is due in 2024..  Testicular self exams recommended once a month. Advised that any firm testicular nodules to be reported immediately.  Prostate cancer screening and prevention : mildly elevated PSA, just borderline, same had took place few years ago - will monitor.      Recent vaccinations:   flu   is up to date and recommended yearly  Tdap  is up to date  Shingles prevention  Zostavax completed, recommended to get new shingles vaccine Shindrix at [facility], benefits explained.  Hep A vaccination - patient will check with [facility] when had the last immunization, no booster needed.                   BPH with incomplete bladder emptying and sone urinary frequency and nocturia - responded very well to Myrbetriq use. Compliant with CC -PAP use. Now perhaps more symptomatic - will increase the dose to 50 mg a day. Patient will call us if better for the refill.  HTN - well controlled.  CAD - asymptomatic. My only concern is elevation in LDL due to lack of exercise and poor diet over the holidays - recommended to try intermittent fasting.  Hyperlipidemia - not well contrlled with current  medication and low fat diet. Target LDL is  < 70 mg/dl (\"very high\" risk for CHD). Patient's 89. Normal liver function tests. Continue same medication and diet.Intermittent fasting and better diet recommended.  Impaired fasting blood sugar and known focal fatty liver - lifestyle modifications discussed with patient and intermittent fasting (limiting caloric intake to " 400-500 momo a day twice a week) recommended. Weight loss will be helpful.  Discoloration of the nail of the right hand thumb - due to the pressure from the ball while bowling. Reassured - will check if gloves are available for the protection of this area.

## 2018-12-18 NOTE — TELEPHONE ENCOUNTER
----- Message from Paulina Ibanez sent at 12/18/2018  3:37 PM EST -----  Patient is requesting for his last labs to be mailed to him. They were not released for me to print for him at the moment.   Pt can be reached [phone]

## 2018-12-18 NOTE — PATIENT INSTRUCTIONS
Risk evaluation:  1. Cardiovascular risk factors: patient has CAD and is being treated.  2. Diabetes risk factors: none.   3. Cancer risk factors: FH of colon polyps.  4. Risky behavior: none. Use of seat belts: regular. Use of sunscreens: regular. SPF 50.   Tattoos: none.   H/o blood transfusion/organ transplant before 1992 or clotting factors transfusion before 1987: none.    Prevention:   Cholesterol test up to date. Cholesterol is mildly elevated. You need to be more careful with your diet and restart exercise as soon as possioble..  Blood sugar test up to date. Fasting blood sugar is mildly elevated. Recommended to avoid sweets and starches: pasta, pizza, bread, potato, corn. .  Hep C testing (for those born [birth year]): completed..  Colonoscopy up to date. Recommended every 10 years. Next screening is due in 2024..  Testicular self exams recommended once a month. Advised that any firm testicular nodules to be reported immediately.  Prostate cancer screening and prevention : mildly elevated PSA, just borderline, same had took place few years ago - will monitor.                         Recent vaccinations:   flu   is up to date and recommended yearly  Tdap  is up to date  Shingles prevention  Zostavax completed, recommended to get new shingles vaccine Shindrix at [facility], benefits explained.  Hep A vaccination - patient will check with [facility] when had the last immunization, no booster needed.

## 2018-12-21 ENCOUNTER — TELEPHONE (OUTPATIENT)
Dept: INTERNAL MEDICINE | Facility: CLINIC | Age: 73
End: 2018-12-21

## 2018-12-21 DIAGNOSIS — N40.1 BENIGN PROSTATIC HYPERPLASIA WITH INCOMPLETE BLADDER EMPTYING: Primary | ICD-10-CM

## 2018-12-21 DIAGNOSIS — R39.14 BENIGN PROSTATIC HYPERPLASIA WITH INCOMPLETE BLADDER EMPTYING: Primary | ICD-10-CM

## 2018-12-21 NOTE — TELEPHONE ENCOUNTER
----- Message from Fany Cardenas sent at 12/21/2018 11:06 AM EST -----  Contact: pt  - Dr Ferrara's pt - RE: new Rx  Pt calling and would like Rx to be called to pharmacy. He informs tried for 3 (three) days and informs that this medication will work. Could you please call Rx to pharmacy? Please advise. Thanks      Mirabegron ER (MYRBETRIQ) 50 MG tablet sustained-release 24 hour 24 hr tablet   Sig - Route: Take 50 mg by mouth Daily. - Oral     [facility] [address] [place] [address] [address] AT [facility] [address] - [phone]  - [phone] FX    Pt # [phone]

## 2018-12-24 DIAGNOSIS — N40.1 BENIGN PROSTATIC HYPERPLASIA WITH INCOMPLETE BLADDER EMPTYING: ICD-10-CM

## 2018-12-24 DIAGNOSIS — R39.14 BENIGN PROSTATIC HYPERPLASIA WITH INCOMPLETE BLADDER EMPTYING: ICD-10-CM

## 2018-12-24 NOTE — TELEPHONE ENCOUNTER
Pt is calling states pharm does not have this prescription and patient goes out of town early morning.    Pt#[phone]

## 2019-01-10 ENCOUNTER — APPOINTMENT (OUTPATIENT)
Dept: PREADMISSION TESTING | Facility: HOSPITAL | Age: 74
End: 2019-01-10

## 2019-01-10 ENCOUNTER — HOSPITAL ENCOUNTER (OUTPATIENT)
Dept: GENERAL RADIOLOGY | Facility: HOSPITAL | Age: 74
Discharge: HOME | End: 2019-01-10
Admitting: ORTHOPAEDIC SURGERY

## 2019-01-10 VITALS
OXYGEN SATURATION: 97 % | DIASTOLIC BLOOD PRESSURE: 77 MMHG | SYSTOLIC BLOOD PRESSURE: 121 MMHG | TEMPERATURE: 97.7 F | BODY MASS INDEX: 26.67 KG/M2 | WEIGHT: 176 LBS | RESPIRATION RATE: 20 BRPM | HEART RATE: 72 BPM | HEIGHT: 68 IN

## 2019-01-10 PROCEDURE — 71046 X-RAY EXAM CHEST 2 VIEWS: CPT

## 2019-01-10 PROCEDURE — 93010 ELECTROCARDIOGRAM REPORT: CPT | Performed by: INTERNAL MEDICINE

## 2019-01-10 PROCEDURE — 93005 ELECTROCARDIOGRAM TRACING: CPT

## 2019-01-10 RX ORDER — MELATONIN
1000 DAILY
COMMUNITY
End: 2021-12-18 | Stop reason: HOSPADM

## 2019-01-10 RX ORDER — CARVEDILOL 3.12 MG/1
3.12 TABLET ORAL 2 TIMES DAILY WITH MEALS
COMMUNITY
End: 2019-02-07 | Stop reason: SDUPTHER

## 2019-01-10 NOTE — DISCHARGE INSTRUCTIONS
Take the following medications the morning of surgery with a small sip of water: CARVEDILOL        General Instructions:  • Do not eat solid food after midnight the night before surgery.  • You may drink clear liquids day of surgery but must stop at least one hour before your hospital arrival time.  • It is beneficial for you to have a clear drink that contains carbohydrates the day of surgery.  We suggest a 12 to 20 ounce bottle of Gatorade or Powerade for non-diabetic patients or a 12 to 20 ounce bottle of G2 or Powerade Zero for diabetic patients. (Pediatric patients, are not advised to drink a 12 to 20 ounce carbohydrate drink)    Clear liquids are liquids you can see through.  Nothing red in color.     Plain water                               Sports drinks  Sodas                                   Gelatin (Jell-O)  Fruit juices without pulp such as white grape juice and apple juice  Popsicles that contain no fruit or yogurt  Tea or coffee (no cream or milk added)  Gatorade / Powerade  G2 / Powerade Zero    • Infants may have breast milk up to four hours before surgery.  • Infants drinking formula may drink formula up to six hours before surgery.   • Patients who avoid smoking, chewing tobacco and alcohol for 4 weeks prior to surgery have a reduced risk of post-operative complications.  Quit smoking as many days before surgery as you can.  • Do not smoke, use chewing tobacco or drink alcohol the day of surgery.   • If applicable bring your C-PAP/ BI-PAP machine.  • Bring any papers given to you in the doctor’s office.  • Wear clean comfortable clothes and socks.  • Do not wear contact lenses or make-up.  Bring a case for your glasses.   • Bring crutches or walker if applicable.  • Remove all piercings.  Leave jewelry and any other valuables at home.  • Hair extensions with metal clips must be removed prior to surgery.  • The Pre-Admission Testing nurse will instruct you to bring medications if unable to obtain  an accurate list in Pre-Admission Testing.        If you were given a blood bank ID arm band remember to bring it with you the day of surgery.    Preventing a Surgical Site Infection:  • For 2 to 3 days before surgery, avoid shaving with a razor because the razor can irritate skin and make it easier to develop an infection.    • Any areas of open skin can increase the risk of a post-operative wound infection by allowing bacteria to enter and travel throughout the body.  Notify your surgeon if you have any skin wounds / rashes even if it is not near the expected surgical site.  The area will need assessed to determine if surgery should be delayed until it is healed.  • The night prior to surgery sleep in a clean bed with clean clothing.  Do not allow pets to sleep with you.  • Shower on the morning of surgery using a fresh bar of anti-bacterial soap (such as Dial) and clean washcloth.  Dry with a clean towel and dress in clean clothing.  • Ask your surgeon if you will be receiving antibiotics prior to surgery.  • Make sure you, your family, and all healthcare providers clean their hands with soap and water or an alcohol based hand  before caring for you or your wound.    Day of surgery:  Upon arrival, a Pre-op nurse and Anesthesiologist will review your health history, obtain vital signs, and answer questions you may have.  The only belongings needed at this time will be your home medications and if applicable your C-PAP/BI-PAP machine.  If you are staying overnight your family can leave the rest of your belongings in the car and bring them to your room later.  A Pre-op nurse will start an IV and you may receive medication in preparation for surgery, including something to help you relax.  Your family will be able to see you in the Pre-op area.  While you are in surgery your family should notify the waiting room  if they leave the waiting room area and provide a contact phone number.    Please be  aware that surgery does come with discomfort.  We want to make every effort to control your discomfort so please discuss any uncontrolled symptoms with your nurse.   Your doctor will most likely have prescribed pain medications.      If you are going home after surgery you will receive individualized written care instructions before being discharged.  A responsible adult must drive you to and from the hospital on the day of your surgery and stay with you for 24 hours.    If you are staying overnight following surgery, you will be transported to your hospital room following the recovery period.  [facility] has all private rooms.    You have received a list of surgical assistants for your reference.  If you have any questions please call Pre-Admission Testing at [phone].  Deductibles and co-payments are collected on the day of service. Please be prepared to pay the required co-pay, deductible or deposit on the day of service as defined by your plan.

## 2019-01-15 ENCOUNTER — ANESTHESIA (OUTPATIENT)
Dept: PERIOP | Facility: HOSPITAL | Age: 74
End: 2019-01-15

## 2019-01-15 ENCOUNTER — HOSPITAL ENCOUNTER (OUTPATIENT)
Facility: HOSPITAL | Age: 74
Setting detail: HOSPITAL OUTPATIENT SURGERY
Discharge: HOME | End: 2019-01-15
Attending: ORTHOPAEDIC SURGERY | Admitting: ORTHOPAEDIC SURGERY

## 2019-01-15 ENCOUNTER — ANESTHESIA EVENT (OUTPATIENT)
Dept: PERIOP | Facility: HOSPITAL | Age: 74
End: 2019-01-15

## 2019-01-15 VITALS
DIASTOLIC BLOOD PRESSURE: 81 MMHG | HEART RATE: 71 BPM | TEMPERATURE: 97.9 F | OXYGEN SATURATION: 98 % | SYSTOLIC BLOOD PRESSURE: 146 MMHG | RESPIRATION RATE: 18 BRPM

## 2019-01-15 PROCEDURE — 25010000002 KETOROLAC TROMETHAMINE PER 15 MG: Performed by: NURSE ANESTHETIST, CERTIFIED REGISTERED

## 2019-01-15 PROCEDURE — 25010000002 PROPOFOL 10 MG/ML EMULSION: Performed by: NURSE ANESTHETIST, CERTIFIED REGISTERED

## 2019-01-15 PROCEDURE — 25010000002 MIDAZOLAM PER 1 MG: Performed by: ANESTHESIOLOGY

## 2019-01-15 PROCEDURE — 25010000002 EPINEPHRINE PER 0.1 MG: Performed by: ORTHOPAEDIC SURGERY

## 2019-01-15 PROCEDURE — 25010000002 FENTANYL CITRATE (PF) 100 MCG/2ML SOLUTION: Performed by: NURSE ANESTHETIST, CERTIFIED REGISTERED

## 2019-01-15 PROCEDURE — 25010000002 ONDANSETRON PER 1 MG: Performed by: NURSE ANESTHETIST, CERTIFIED REGISTERED

## 2019-01-15 PROCEDURE — 25010000002 DEXAMETHASONE PER 1 MG: Performed by: NURSE ANESTHETIST, CERTIFIED REGISTERED

## 2019-01-15 RX ORDER — PROPOFOL 10 MG/ML
VIAL (ML) INTRAVENOUS AS NEEDED
Status: DISCONTINUED | OUTPATIENT
Start: 2019-01-15 | End: 2019-01-15 | Stop reason: SURG

## 2019-01-15 RX ORDER — FENTANYL CITRATE 50 UG/ML
50 INJECTION, SOLUTION INTRAMUSCULAR; INTRAVENOUS
Status: DISCONTINUED | OUTPATIENT
Start: 2019-01-15 | End: 2019-01-15 | Stop reason: HOSPADM

## 2019-01-15 RX ORDER — SODIUM CHLORIDE 0.9 % (FLUSH) 0.9 %
1-10 SYRINGE (ML) INJECTION AS NEEDED
Status: DISCONTINUED | OUTPATIENT
Start: 2019-01-15 | End: 2019-01-15 | Stop reason: HOSPADM

## 2019-01-15 RX ORDER — PROMETHAZINE HYDROCHLORIDE 25 MG/ML
12.5 INJECTION, SOLUTION INTRAMUSCULAR; INTRAVENOUS ONCE AS NEEDED
Status: DISCONTINUED | OUTPATIENT
Start: 2019-01-15 | End: 2019-01-15 | Stop reason: HOSPADM

## 2019-01-15 RX ORDER — PROMETHAZINE HYDROCHLORIDE 25 MG/1
25 SUPPOSITORY RECTAL ONCE AS NEEDED
Status: DISCONTINUED | OUTPATIENT
Start: 2019-01-15 | End: 2019-01-15 | Stop reason: HOSPADM

## 2019-01-15 RX ORDER — CLINDAMYCIN PHOSPHATE 900 MG/50ML
900 INJECTION INTRAVENOUS ONCE
Status: COMPLETED | OUTPATIENT
Start: 2019-01-15 | End: 2019-01-15

## 2019-01-15 RX ORDER — FENTANYL CITRATE 50 UG/ML
INJECTION, SOLUTION INTRAMUSCULAR; INTRAVENOUS AS NEEDED
Status: DISCONTINUED | OUTPATIENT
Start: 2019-01-15 | End: 2019-01-15 | Stop reason: SURG

## 2019-01-15 RX ORDER — LIDOCAINE HYDROCHLORIDE 20 MG/ML
INJECTION, SOLUTION INFILTRATION; PERINEURAL AS NEEDED
Status: DISCONTINUED | OUTPATIENT
Start: 2019-01-15 | End: 2019-01-15 | Stop reason: SURG

## 2019-01-15 RX ORDER — HYDROCODONE BITARTRATE AND ACETAMINOPHEN 7.5; 325 MG/1; MG/1
1 TABLET ORAL ONCE AS NEEDED
Status: DISCONTINUED | OUTPATIENT
Start: 2019-01-15 | End: 2019-01-15 | Stop reason: HOSPADM

## 2019-01-15 RX ORDER — KETOROLAC TROMETHAMINE 30 MG/ML
INJECTION, SOLUTION INTRAMUSCULAR; INTRAVENOUS AS NEEDED
Status: DISCONTINUED | OUTPATIENT
Start: 2019-01-15 | End: 2019-01-15 | Stop reason: SURG

## 2019-01-15 RX ORDER — SODIUM CHLORIDE, SODIUM LACTATE, POTASSIUM CHLORIDE, CALCIUM CHLORIDE 600; 310; 30; 20 MG/100ML; MG/100ML; MG/100ML; MG/100ML
9 INJECTION, SOLUTION INTRAVENOUS CONTINUOUS
Status: DISCONTINUED | OUTPATIENT
Start: 2019-01-15 | End: 2019-01-15 | Stop reason: HOSPADM

## 2019-01-15 RX ORDER — FAMOTIDINE 10 MG/ML
20 INJECTION, SOLUTION INTRAVENOUS ONCE
Status: COMPLETED | OUTPATIENT
Start: 2019-01-15 | End: 2019-01-15

## 2019-01-15 RX ORDER — ONDANSETRON 2 MG/ML
4 INJECTION INTRAMUSCULAR; INTRAVENOUS ONCE AS NEEDED
Status: DISCONTINUED | OUTPATIENT
Start: 2019-01-15 | End: 2019-01-15 | Stop reason: HOSPADM

## 2019-01-15 RX ORDER — ONDANSETRON 2 MG/ML
INJECTION INTRAMUSCULAR; INTRAVENOUS AS NEEDED
Status: DISCONTINUED | OUTPATIENT
Start: 2019-01-15 | End: 2019-01-15 | Stop reason: SURG

## 2019-01-15 RX ORDER — NALOXONE HCL 0.4 MG/ML
0.2 VIAL (ML) INJECTION AS NEEDED
Status: DISCONTINUED | OUTPATIENT
Start: 2019-01-15 | End: 2019-01-15 | Stop reason: HOSPADM

## 2019-01-15 RX ORDER — DIPHENHYDRAMINE HYDROCHLORIDE 50 MG/ML
12.5 INJECTION INTRAMUSCULAR; INTRAVENOUS
Status: DISCONTINUED | OUTPATIENT
Start: 2019-01-15 | End: 2019-01-15 | Stop reason: HOSPADM

## 2019-01-15 RX ORDER — FLUMAZENIL 0.1 MG/ML
0.2 INJECTION INTRAVENOUS AS NEEDED
Status: DISCONTINUED | OUTPATIENT
Start: 2019-01-15 | End: 2019-01-15 | Stop reason: HOSPADM

## 2019-01-15 RX ORDER — DIPHENHYDRAMINE HCL 25 MG
25 CAPSULE ORAL
Status: DISCONTINUED | OUTPATIENT
Start: 2019-01-15 | End: 2019-01-15 | Stop reason: HOSPADM

## 2019-01-15 RX ORDER — HYDRALAZINE HYDROCHLORIDE 20 MG/ML
5 INJECTION INTRAMUSCULAR; INTRAVENOUS
Status: DISCONTINUED | OUTPATIENT
Start: 2019-01-15 | End: 2019-01-15 | Stop reason: HOSPADM

## 2019-01-15 RX ORDER — MIDAZOLAM HYDROCHLORIDE 1 MG/ML
1 INJECTION INTRAMUSCULAR; INTRAVENOUS
Status: DISCONTINUED | OUTPATIENT
Start: 2019-01-15 | End: 2019-01-15 | Stop reason: HOSPADM

## 2019-01-15 RX ORDER — DEXAMETHASONE SODIUM PHOSPHATE 10 MG/ML
INJECTION INTRAMUSCULAR; INTRAVENOUS AS NEEDED
Status: DISCONTINUED | OUTPATIENT
Start: 2019-01-15 | End: 2019-01-15 | Stop reason: SURG

## 2019-01-15 RX ORDER — LIDOCAINE HYDROCHLORIDE 10 MG/ML
0.5 INJECTION, SOLUTION EPIDURAL; INFILTRATION; INTRACAUDAL; PERINEURAL ONCE AS NEEDED
Status: DISCONTINUED | OUTPATIENT
Start: 2019-01-15 | End: 2019-01-15 | Stop reason: HOSPADM

## 2019-01-15 RX ORDER — HYDROMORPHONE HYDROCHLORIDE 1 MG/ML
0.5 INJECTION, SOLUTION INTRAMUSCULAR; INTRAVENOUS; SUBCUTANEOUS
Status: DISCONTINUED | OUTPATIENT
Start: 2019-01-15 | End: 2019-01-15 | Stop reason: HOSPADM

## 2019-01-15 RX ORDER — PROMETHAZINE HYDROCHLORIDE 25 MG/1
25 TABLET ORAL ONCE AS NEEDED
Status: DISCONTINUED | OUTPATIENT
Start: 2019-01-15 | End: 2019-01-15 | Stop reason: HOSPADM

## 2019-01-15 RX ORDER — BUPIVACAINE HYDROCHLORIDE AND EPINEPHRINE 5; 5 MG/ML; UG/ML
INJECTION, SOLUTION PERINEURAL AS NEEDED
Status: DISCONTINUED | OUTPATIENT
Start: 2019-01-15 | End: 2019-01-15 | Stop reason: HOSPADM

## 2019-01-15 RX ORDER — MIDAZOLAM HYDROCHLORIDE 1 MG/ML
2 INJECTION INTRAMUSCULAR; INTRAVENOUS
Status: DISCONTINUED | OUTPATIENT
Start: 2019-01-15 | End: 2019-01-15 | Stop reason: HOSPADM

## 2019-01-15 RX ORDER — OXYCODONE AND ACETAMINOPHEN 7.5; 325 MG/1; MG/1
1 TABLET ORAL ONCE AS NEEDED
Status: COMPLETED | OUTPATIENT
Start: 2019-01-15 | End: 2019-01-15

## 2019-01-15 RX ORDER — ACETAMINOPHEN 650 MG/1
650 SUPPOSITORY RECTAL ONCE AS NEEDED
Status: DISCONTINUED | OUTPATIENT
Start: 2019-01-15 | End: 2019-01-15 | Stop reason: HOSPADM

## 2019-01-15 RX ORDER — ACETAMINOPHEN 325 MG/1
650 TABLET ORAL ONCE AS NEEDED
Status: DISCONTINUED | OUTPATIENT
Start: 2019-01-15 | End: 2019-01-15 | Stop reason: HOSPADM

## 2019-01-15 RX ORDER — EPHEDRINE SULFATE 50 MG/ML
5 INJECTION, SOLUTION INTRAVENOUS ONCE AS NEEDED
Status: DISCONTINUED | OUTPATIENT
Start: 2019-01-15 | End: 2019-01-15 | Stop reason: HOSPADM

## 2019-01-15 RX ADMIN — SODIUM CHLORIDE, POTASSIUM CHLORIDE, SODIUM LACTATE AND CALCIUM CHLORIDE 9 ML/HR: 600; 310; 30; 20 INJECTION, SOLUTION INTRAVENOUS at 11:33

## 2019-01-15 RX ADMIN — DEXAMETHASONE SODIUM PHOSPHATE 4 MG: 10 INJECTION INTRAMUSCULAR; INTRAVENOUS at 12:20

## 2019-01-15 RX ADMIN — ONDANSETRON 4 MG: 2 INJECTION INTRAMUSCULAR; INTRAVENOUS at 12:45

## 2019-01-15 RX ADMIN — CLINDAMYCIN PHOSPHATE 900 MG: 900 INJECTION INTRAVENOUS at 12:20

## 2019-01-15 RX ADMIN — KETOROLAC TROMETHAMINE 30 MG: 30 INJECTION, SOLUTION INTRAMUSCULAR; INTRAVENOUS at 12:45

## 2019-01-15 RX ADMIN — FENTANYL CITRATE 50 MCG: 50 INJECTION INTRAMUSCULAR; INTRAVENOUS at 12:55

## 2019-01-15 RX ADMIN — OXYCODONE HYDROCHLORIDE AND ACETAMINOPHEN 1 TABLET: 7.5; 325 TABLET ORAL at 13:32

## 2019-01-15 RX ADMIN — FENTANYL CITRATE 50 MCG: 50 INJECTION INTRAMUSCULAR; INTRAVENOUS at 12:14

## 2019-01-15 RX ADMIN — PROPOFOL 180 MG: 10 INJECTION, EMULSION INTRAVENOUS at 12:14

## 2019-01-15 RX ADMIN — FAMOTIDINE 20 MG: 10 INJECTION, SOLUTION INTRAVENOUS at 11:46

## 2019-01-15 RX ADMIN — MIDAZOLAM HYDROCHLORIDE 1 MG: 2 INJECTION, SOLUTION INTRAMUSCULAR; INTRAVENOUS at 11:47

## 2019-01-15 RX ADMIN — LIDOCAINE HYDROCHLORIDE 100 MG: 20 INJECTION, SOLUTION INFILTRATION; PERINEURAL at 12:14

## 2019-01-15 NOTE — DISCHARGE INSTRUCTIONS
YOU HAD ONE PAIN PILL (PERCOCET 7.5MG)  AT 1:30 PM      SEE DR. RIVAS HANDOUT FOR FURTHER INSTRUCTIONS.

## 2019-01-15 NOTE — ANESTHESIA PROCEDURE NOTES
ANESTHESIA INTUBATION  Urgency: elective    Airway not difficult    General Information and Staff    Patient location during procedure: OR  Anesthesiologist: Rob Kapadia MD  CRNA: Alethea Casillas CRNA    Indications and Patient Condition  Indications for airway management: airway protection    Preoxygenated: yes (Pt pre-O2 with 100% O2)  Mask difficulty assessment: 0 - not attempted    Final Airway Details  Final airway type: supraglottic airway      Successful airway: classic  Size 5    Number of attempts at approach: 1    Additional Comments  Appears ATOLMA x1. No change in dentition. + ETCO2. Airway seal pressure <20cm H2O.

## 2019-01-15 NOTE — ANESTHESIA POSTPROCEDURE EVALUATION
Patient: Chuck Zheng    Procedure Summary     Date:  01/15/19 Room / Location:   [facility] [address] [place] [address] [facility]    Anesthesia Start:  1213 Anesthesia Stop:  1257    Procedure:  LEFT KNEE ARTHROSCOPY,  PARTIAL MEDIAL MENISECTOMY (Left Knee) Diagnosis:      Surgeon:  Mason Ochoa MD Provider:  Rob Kapadia MD    Anesthesia Type:  general ASA Status:  3          Anesthesia Type: general  Last vitals  BP   146/81 (01/15/19 1343)   Temp   36.6 °C (97.9 °F) (01/15/19 1253)   Pulse   71 (01/15/19 1343)   Resp   18 (01/15/19 1343)     SpO2   98 % (01/15/19 1343)     Post Anesthesia Care and Evaluation    Patient location during evaluation: bedside  Patient participation: complete - patient participated  Level of consciousness: awake  Pain score: 2  Pain management: adequate  Airway patency: patent  Anesthetic complications: No anesthetic complications  PONV Status: none  Cardiovascular status: acceptable  Respiratory status: acceptable  Hydration status: acceptable    Comments: /81   Pulse 71   Temp 36.6 °C (97.9 °F) (Temporal)   Resp 18   SpO2 98%

## 2019-01-15 NOTE — BRIEF OP NOTE
KNEE ARTHROSCOPY  Progress Note    Chuck Zheng  1/15/2019    Pre-op Diagnosis:   Lt mmt, djd       Post-Op Diagnosis Codes:   pmm, chondroplasty    Procedure/CPT® Codes:      Procedure(s):  LEFT KNEE ARTHROSCOPY,  PARTIAL MEDIAL MENISECTOMY    Surgeon(s):  Mason Ochoa MD    Anesthesia: General    Staff:   Circulator: Maricarmen Farris RN; Radha Yarbrough RN  Scrub Person: Damaris Lenz Noraine  Assistant: Kita Sierra    Estimated Blood Loss: minimal    Urine Voided: * No values recorded between 1/15/2019 12:11 PM and 1/15/2019 12:42 PM *    Specimens:                None      Drains:      Findings: above, adv djd    Complications: none known      DEBBY Ochoa MD     Date: 1/15/2019  Time: 12:42 PM

## 2019-01-15 NOTE — ANESTHESIA PREPROCEDURE EVALUATION
Anesthesia Evaluation     Patient summary reviewed and Nursing notes reviewed   NPO Solid Status: > 8 hours  NPO Liquid Status: > 2 hours           Airway   Mallampati: II  TM distance: >3 FB  Neck ROM: full  Dental - normal exam     Pulmonary - normal exam   (+) sleep apnea on CPAP,   Cardiovascular - normal exam    ECG reviewed    (+) hypertension, past MI , CAD, hyperlipidemia,       Neuro/Psych  (+) syncope,     GI/Hepatic/Renal/Endo    (+)   liver disease fatty liver disease,     Musculoskeletal (-) negative ROS    Abdominal    Substance History - negative use     OB/GYN negative ob/gyn ROS         Other                        Anesthesia Plan    ASA 3     general     Anesthetic plan, all risks, benefits, and alternatives have been provided, discussed and informed consent has been obtained with: patient.

## 2019-02-07 ENCOUNTER — OFFICE VISIT (OUTPATIENT)
Dept: INTERNAL MEDICINE | Facility: CLINIC | Age: 74
End: 2019-02-07

## 2019-02-07 ENCOUNTER — APPOINTMENT (OUTPATIENT)
Dept: WOMENS IMAGING | Facility: HOSPITAL | Age: 74
End: 2019-02-07

## 2019-02-07 VITALS
WEIGHT: 173 LBS | SYSTOLIC BLOOD PRESSURE: 122 MMHG | HEIGHT: 68 IN | BODY MASS INDEX: 26.22 KG/M2 | RESPIRATION RATE: 14 BRPM | DIASTOLIC BLOOD PRESSURE: 74 MMHG

## 2019-02-07 DIAGNOSIS — M25.512 LEFT ANTERIOR SHOULDER PAIN: Primary | ICD-10-CM

## 2019-02-07 PROCEDURE — 99213 OFFICE O/P EST LOW 20 MIN: CPT | Performed by: INTERNAL MEDICINE

## 2019-02-07 PROCEDURE — 73060 X-RAY EXAM OF HUMERUS: CPT | Performed by: RADIOLOGY

## 2019-02-07 PROCEDURE — 73060 X-RAY EXAM OF HUMERUS: CPT | Performed by: INTERNAL MEDICINE

## 2019-02-07 NOTE — PATIENT INSTRUCTIONS
Will check x-rays. Most likely bicept tendonitis. Needs to use heat, Asparcream, Salonpas patches.Might need an MRI. Already has f/u with Dr.Smith Brisa houser in a week.

## 2019-02-10 DIAGNOSIS — M25.512 LEFT ANTERIOR SHOULDER PAIN: Primary | ICD-10-CM

## 2019-02-11 DIAGNOSIS — M25.512 LEFT ANTERIOR SHOULDER PAIN: Primary | ICD-10-CM

## 2019-02-12 ENCOUNTER — TELEPHONE (OUTPATIENT)
Dept: INTERNAL MEDICINE | Facility: CLINIC | Age: 74
End: 2019-02-12

## 2019-02-12 DIAGNOSIS — M25.512 LEFT ANTERIOR SHOULDER PAIN: Primary | ICD-10-CM

## 2019-02-12 NOTE — TELEPHONE ENCOUNTER
----- Message from Fany Cardenas sent at 2/12/2019 10:45 AM EST -----  Contact: pt - Dr Ferrara's pt - RE: Xray results  Pt calling and would like a return call regarding [facility] Xray results. Could you please call pt to discuss? Please advise. Thanks    Pt would like to know if MRI is needed.    Pt # [phone]

## 2019-02-12 NOTE — TELEPHONE ENCOUNTER
Patient notified Xray was normal, he is being referred for MRI, which we are trying to obtain authorization from his insurance company once approved the hospital will be contacting him to establish a testing date. Patient was very pleased

## 2019-02-12 NOTE — TELEPHONE ENCOUNTER
Please call patient: I am very confused, because I had asked to call patient few days ago. So nl x-ray, nothing to explain the pain. We are working with his insurance and got him approved for the MRI, which is being scheduled. There had been delay due to computer glitch, and I had requested help from IT, as I do not have an ability to order the MRI of this area with available software.

## 2019-02-13 ENCOUNTER — OFFICE VISIT (OUTPATIENT)
Dept: SLEEP MEDICINE | Facility: HOSPITAL | Age: 74
End: 2019-02-13
Attending: INTERNAL MEDICINE

## 2019-02-13 ENCOUNTER — DOCUMENTATION (OUTPATIENT)
Dept: SLEEP MEDICINE | Facility: HOSPITAL | Age: 74
End: 2019-02-13

## 2019-02-13 VITALS — HEIGHT: 68 IN | BODY MASS INDEX: 26.43 KG/M2 | WEIGHT: 174.4 LBS

## 2019-02-13 DIAGNOSIS — Z99.89 OSA ON CPAP: Primary | ICD-10-CM

## 2019-02-13 DIAGNOSIS — G47.33 OSA ON CPAP: Primary | ICD-10-CM

## 2019-02-13 PROCEDURE — 99213 OFFICE O/P EST LOW 20 MIN: CPT | Performed by: INTERNAL MEDICINE

## 2019-02-13 PROCEDURE — G0463 HOSPITAL OUTPT CLINIC VISIT: HCPCS

## 2019-02-13 NOTE — PROGRESS NOTES
"Follow Up [facility] Disorders [facility] Note     Chief Complaint:  PLACIDO     Primary Care Physician: Patricia Ferrara MD    Interval History:   I last saw the patient in May.  He reports not sleeping well?  In November he did have a knee scope.  He is presently having some arm issues.  He goes to bed at 10:30 PM and awakens at 7:45 AM.  [place] Sleepiness Scale is abnormal at 9    Review of Systems:  Recorded on the Sleep Questionnaire.  Unremarkable except for nasal congestion    Social History: He will have 3 cups of coffee a day.  Social History     Socioeconomic History   • Marital status:      Spouse name: Not on file   • Number of children: Not on file   • Years of education: Not on file   • Highest education level: Not on file   Tobacco Use   • Smoking status: Never Smoker   • Smokeless tobacco: Never Used   Substance and Sexual Activity   • Alcohol use: No     Frequency: Never     Comment: 7 drinks weekly   • Drug use: No       Allergies:  Ace inhibitors; Beta adrenergic blockers; Cephalexin; and Sulfa antibiotics     Medication Review:  Reviewed.      Vital Signs:    Vitals:    02/13/19 0900   Weight: 79.1 kg (174 lb 6.4 oz)   Height: 172.7 cm (68\")     Body mass index is 26.52 kg/m².    Physical Exam:    Constitutional:  Well developed 73 y.o. male that appears in no apparent distress.  Awake & oriented times 3.  Normal mood with normal recent and remote memory and normal judgement.  Eyes:  Conjunctivae normal.  Oropharynx:  moist mucous membranes without exudate and a normal sized tongue and previous UPPP and mild narrowing of the posterior pharyngeal opening and class II-3 MP airway     Results Review:  DME is [employer] and he uses a fullface mask.  Downloads between 11/14 through 2/11/2019 compliance 100%.  Average usage is 8 hours and 29 minutes.  Average AHI is mildly abnormal at 10.1 without leak.  Average CPAP-Check pressure is 10.5.       Impression:   Obstructive sleep apnea yearly adequately " treated with CPAP check device with good compliance and usage and some complaints of hypersomnolence.    Plan:  Good sleep hygiene measures should be maintained.  Some weight loss would be beneficial in this patient who is overweight by BMI.  The patient is benefiting from the treatment being provided.     The patient will continue with the same device.  The lower pressure limit will be increased from 9 up to 10.  A new prescription will be sent to his DME    The patient will call for any problems and will follow up in 1 year.      Kaden Call MD  Sleep Medicine  02/13/19  10:51 AM

## 2019-02-13 NOTE — PROGRESS NOTES
Patient seen in sleep clinic with Dr. Call, Per  Wanted pressure change to setting of 10cm. Changes made via [employer] through patients active modem. MAB

## 2019-02-14 ENCOUNTER — TRANSCRIBE ORDERS (OUTPATIENT)
Dept: ADMINISTRATIVE | Facility: HOSPITAL | Age: 74
End: 2019-02-14

## 2019-02-14 DIAGNOSIS — Z03.89 CORONARY ARTERY DISEASE (CAD) EXCLUDED: ICD-10-CM

## 2019-02-14 DIAGNOSIS — R06.02 SHORTNESS OF BREATH: Primary | ICD-10-CM

## 2019-03-18 ENCOUNTER — HOSPITAL ENCOUNTER (OUTPATIENT)
Dept: NUCLEAR MEDICINE | Facility: HOSPITAL | Age: 74
Discharge: HOME | End: 2019-03-18

## 2019-03-18 DIAGNOSIS — R06.02 SHORTNESS OF BREATH: ICD-10-CM

## 2019-03-18 DIAGNOSIS — Z03.89 CORONARY ARTERY DISEASE (CAD) EXCLUDED: ICD-10-CM

## 2019-03-18 PROCEDURE — 93017 CV STRESS TEST TRACING ONLY: CPT

## 2019-03-18 PROCEDURE — 93016 CV STRESS TEST SUPVJ ONLY: CPT | Performed by: INTERNAL MEDICINE

## 2019-03-18 PROCEDURE — 0 TECHNETIUM SESTAMIBI: Performed by: INTERNAL MEDICINE

## 2019-03-18 PROCEDURE — 78452 HT MUSCLE IMAGE SPECT MULT: CPT

## 2019-03-18 PROCEDURE — A9500 TC99M SESTAMIBI: HCPCS | Performed by: INTERNAL MEDICINE

## 2019-03-18 PROCEDURE — 78452 HT MUSCLE IMAGE SPECT MULT: CPT | Performed by: INTERNAL MEDICINE

## 2019-03-18 PROCEDURE — 93018 CV STRESS TEST I&R ONLY: CPT | Performed by: INTERNAL MEDICINE

## 2019-03-18 RX ADMIN — TECHNETIUM TC 99M SESTAMIBI 1 DOSE: 1 INJECTION INTRAVENOUS at 07:52

## 2019-03-18 RX ADMIN — TECHNETIUM TC 99M SESTAMIBI 1 DOSE: 1 INJECTION INTRAVENOUS at 08:48

## 2019-03-20 LAB
BH CV STRESS BP STAGE 1: NORMAL
BH CV STRESS BP STAGE 2: NORMAL
BH CV STRESS BP STAGE 3: NORMAL
BH CV STRESS BP STAGE 4: NORMAL
BH CV STRESS DURATION MIN STAGE 1: 3
BH CV STRESS DURATION MIN STAGE 2: 3
BH CV STRESS DURATION MIN STAGE 3: 3
BH CV STRESS DURATION MIN STAGE 4: 0
BH CV STRESS DURATION SEC STAGE 1: 0
BH CV STRESS DURATION SEC STAGE 2: 0
BH CV STRESS DURATION SEC STAGE 3: 0
BH CV STRESS DURATION SEC STAGE 4: 30
BH CV STRESS GRADE STAGE 1: 10
BH CV STRESS GRADE STAGE 2: 12
BH CV STRESS GRADE STAGE 3: 14
BH CV STRESS GRADE STAGE 4: 16
BH CV STRESS HR STAGE 1: 104
BH CV STRESS HR STAGE 2: 113
BH CV STRESS HR STAGE 3: 126
BH CV STRESS HR STAGE 4: 137
BH CV STRESS METS STAGE 1: 5
BH CV STRESS METS STAGE 2: 7.5
BH CV STRESS METS STAGE 3: 10
BH CV STRESS METS STAGE 4: 13.5
BH CV STRESS PROTOCOL 1: NORMAL
BH CV STRESS RECOVERY BP: NORMAL MMHG
BH CV STRESS RECOVERY HR: 95 BPM
BH CV STRESS SPEED STAGE 1: 1.7
BH CV STRESS SPEED STAGE 2: 2.5
BH CV STRESS SPEED STAGE 3: 3.4
BH CV STRESS SPEED STAGE 4: 4.2
BH CV STRESS STAGE 1: 1
BH CV STRESS STAGE 2: 2
BH CV STRESS STAGE 3: 3
BH CV STRESS STAGE 4: 4
MAXIMAL PREDICTED HEART RATE: 148 BPM
PERCENT MAX PREDICTED HR: 92.57 %
STRESS BASELINE BP: NORMAL MMHG
STRESS BASELINE HR: 72 BPM
STRESS PERCENT HR: 109 %
STRESS POST ESTIMATED WORKLOAD: 10.6 METS
STRESS POST EXERCISE DUR MIN: 9 MIN
STRESS POST EXERCISE DUR SEC: 30 SEC
STRESS POST PEAK BP: NORMAL MMHG
STRESS POST PEAK HR: 137 BPM
STRESS TARGET HR: 126 BPM

## 2019-04-08 ENCOUNTER — TELEPHONE (OUTPATIENT)
Dept: INTERNAL MEDICINE | Facility: CLINIC | Age: 74
End: 2019-04-08

## 2019-04-08 RX ORDER — ZOLPIDEM TARTRATE 10 MG/1
10 TABLET ORAL NIGHTLY PRN
Qty: 20 TABLET | Refills: 0 | OUTPATIENT
Start: 2019-04-08 | End: 2019-10-03 | Stop reason: SDUPTHER

## 2019-04-08 NOTE — TELEPHONE ENCOUNTER
----- Message from Shelby Senior sent at 4/8/2019 11:41 AM EDT -----  Contact: pt  Pt is calling, he was asked to call before leaving the country to get help with sleeping by  medication.    Pt#[phone]     [facility] [employer] [address] [place] [address] [address] AT [facility] [address]  - [phone]  - [phone] FX

## 2019-04-08 NOTE — TELEPHONE ENCOUNTER
Patient states on past trip Ambien 10 mg has worked well for him. He will be gone 20 days all together. He states quantity 20 will be plenty. Please advise

## 2019-05-04 DIAGNOSIS — I25.10 ATHEROSCLEROSIS OF NATIVE CORONARY ARTERY OF NATIVE HEART WITHOUT ANGINA PECTORIS: ICD-10-CM

## 2019-05-06 RX ORDER — CARVEDILOL 3.12 MG/1
TABLET ORAL
Qty: 180 TABLET | Refills: 1 | Status: SHIPPED | OUTPATIENT
Start: 2019-05-06 | End: 2019-10-29 | Stop reason: SDUPTHER

## 2019-05-08 ENCOUNTER — LAB (OUTPATIENT)
Dept: INTERNAL MEDICINE | Facility: CLINIC | Age: 74
End: 2019-05-08

## 2019-05-08 DIAGNOSIS — E78.00 PURE HYPERCHOLESTEROLEMIA: ICD-10-CM

## 2019-05-08 DIAGNOSIS — R97.20 ELEVATED PSA, LESS THAN 10 NG/ML: Primary | ICD-10-CM

## 2019-05-08 LAB
ALBUMIN SERPL-MCNC: 4.5 G/DL (ref 3.5–5.2)
ALBUMIN/GLOB SERPL: 1.9 G/DL
ALP SERPL-CCNC: 59 U/L (ref 39–117)
ALT SERPL-CCNC: 21 U/L (ref 1–41)
AST SERPL-CCNC: 17 U/L (ref 1–40)
BILIRUB SERPL-MCNC: 0.6 MG/DL (ref 0.2–1.2)
BUN SERPL-MCNC: 15 MG/DL (ref 8–23)
BUN/CREAT SERPL: 15.2 (ref 7–25)
CALCIUM SERPL-MCNC: 10 MG/DL (ref 8.6–10.5)
CHLORIDE SERPL-SCNC: 104 MMOL/L (ref 98–107)
CHOLEST SERPL-MCNC: 168 MG/DL (ref 0–200)
CO2 SERPL-SCNC: 30.1 MMOL/L (ref 22–29)
CREAT SERPL-MCNC: 0.99 MG/DL (ref 0.76–1.27)
GLOBULIN SER CALC-MCNC: 2.4 GM/DL
GLUCOSE SERPL-MCNC: 94 MG/DL (ref 65–99)
HDLC SERPL-MCNC: 58 MG/DL (ref 40–60)
LDLC SERPL CALC-MCNC: 92 MG/DL (ref 0–100)
POTASSIUM SERPL-SCNC: 4.6 MMOL/L (ref 3.5–5.2)
PROT SERPL-MCNC: 6.9 G/DL (ref 6–8.5)
PSA SERPL-MCNC: 6.52 NG/ML (ref 0–4)
SODIUM SERPL-SCNC: 142 MMOL/L (ref 136–145)
TRIGL SERPL-MCNC: 90 MG/DL (ref 0–150)
VLDLC SERPL-MCNC: 18 MG/DL

## 2019-05-17 ENCOUNTER — OFFICE VISIT (OUTPATIENT)
Dept: INTERNAL MEDICINE | Facility: CLINIC | Age: 74
End: 2019-05-17

## 2019-05-17 VITALS
SYSTOLIC BLOOD PRESSURE: 130 MMHG | OXYGEN SATURATION: 98 % | WEIGHT: 170 LBS | DIASTOLIC BLOOD PRESSURE: 80 MMHG | BODY MASS INDEX: 25.76 KG/M2 | HEIGHT: 68 IN | HEART RATE: 87 BPM

## 2019-05-17 DIAGNOSIS — E78.00 PURE HYPERCHOLESTEROLEMIA: ICD-10-CM

## 2019-05-17 DIAGNOSIS — F51.01 PRIMARY INSOMNIA: ICD-10-CM

## 2019-05-17 DIAGNOSIS — R97.20 ELEVATED PSA, LESS THAN 10 NG/ML: Primary | ICD-10-CM

## 2019-05-17 DIAGNOSIS — I10 BENIGN ESSENTIAL HYPERTENSION: ICD-10-CM

## 2019-05-17 PROCEDURE — 99214 OFFICE O/P EST MOD 30 MIN: CPT | Performed by: INTERNAL MEDICINE

## 2019-05-17 RX ORDER — AMITRIPTYLINE HYDROCHLORIDE 25 MG/1
25 TABLET, FILM COATED ORAL NIGHTLY
Qty: 30 TABLET | Refills: 11 | Status: SHIPPED | OUTPATIENT
Start: 2019-05-17 | End: 2020-05-11

## 2019-05-17 NOTE — PATIENT INSTRUCTIONS
"HTN - well controlled with current medication regimen. Normal kidney tests and electrolytes. Recommended low salt diet. Continue same medical treatment.  Hyperlipidemia - not well contrlled with statin. Normal liver function tests. LDL target is below < 70 mg/dl (\"very high\" risk for CHD). Patient's 89. Continue Crestor, but change the dose to 40 mg. Diet discussed. Regular exercise recommended.  Elevated PSA - the level keeps increasing - will refer to urologist.  Insomnia - staying asleep seems to be a problem. Will try Amitriptyline 25 mg, and is still needs improvement consider Trazodone or Doxepine.      "

## 2019-05-17 NOTE — PROGRESS NOTES
"Darren Zheng is a 73 y.o. male.     History of Present Illness     /80   Pulse 87   Ht 172.7 cm (67.99\")   Wt 77.1 kg (170 lb)   SpO2 98%   BMI 25.85 kg/m²     Current Outpatient Medications:   •  aspirin (ASPIR) 81 MG EC tablet, Take 81 mg by mouth Every Night. STOPPED 1/2/19, Disp: , Rfl:   •  B Complex Vitamins (VITAMIN B COMPLEX) capsule capsule, Take  by mouth Daily., Disp: , Rfl:   •  carvedilol (COREG) 3.125 MG tablet, TAKE 1 TABLET BY MOUTH TWICE DAILY WITH MEALS, Disp: 180 tablet, Rfl: 1  •  Cetirizine HCl (ZYRTEC ALLERGY) 10 MG capsule, Take 10 mg by mouth Every Morning., Disp: , Rfl:   •  cholecalciferol (VITAMIN D3) 1000 units tablet, Take 1,000 Units by mouth Daily. STOPPED 1/2/19, Disp: , Rfl:   •  Cyanocobalamin (VITAMIN B 12) 100 MCG lozenge, Take 1 tablet by mouth Every Morning. 500 mg daily STOPPED 1/2/19, Disp: , Rfl:   •  fexofenadine (ALLEGRA) 180 MG tablet, Take 180 mg by mouth Every Morning., Disp: , Rfl:   •  Mirabegron ER (MYRBETRIQ) 50 MG tablet sustained-release 24 hour 24 hr tablet, Take 50 mg by mouth Daily. (Patient taking differently: Take 50 mg by mouth Every Morning.), Disp: 90 tablet, Rfl: 3  •  Misc Natural Products (OSTEO BI-FLEX ADV JOINT SHIELD PO), Take  by mouth. STOPPED 1/2/19, Disp: , Rfl:   •  Multiple Vitamin (MULTI VITAMIN DAILY) tablet, Take  by mouth. STOPPED 1/2/19, Disp: , Rfl:   •  Omega-3 Fatty Acids (FISH OIL DOUBLE STRENGTH) 1200 MG capsule, Take  by mouth. STOPPED 1/2/19, Disp: , Rfl:   •  rosuvastatin (CRESTOR) 40 MG tablet, Take 1 tablet by mouth Daily. (Patient taking differently: Take 40 mg by mouth Every Morning.), Disp: 90 tablet, Rfl: 3  •  zolpidem (AMBIEN) 10 MG tablet, Take 1 tablet by mouth At Night As Needed for Sleep., Disp: 20 tablet, Rfl: 0    Patient has long-standing benign essential HTN.  BP is usually well controlled with daily use of b-blocker. On low salt diet with good compliance. Takes medication regularly. " "Denies chest pain, dyspnea,lightheadedness,  lower extremity edema. Patient does not check blood pressure    Patient has been diagnosed with hyperlipidemia. Target LDL is < 70 mg/dl (\"very high\" risk for CHD). Patient is on low fat diet with good compliance. Patient is compliant with treatment: daily use of Crestor (rozuvastatin). Side effects of medication: none. Exercise walking and and home PT.    Patient had been noticed to have elevated PSA.    Chuck Zheng 73 y.o. male is here for chronic insomnia f/u. Patient had been suffering from insomnia for years. The major issue had been frequent night time awakening. Contributing factors include PLACIDO and stress.Patient is compliant with nightly C-PAP use. Patient had tried Ambien (Zolpidem). Patient had found great relief while on medication.He had also tried Amitriptyline of his wife's and had found great relief while on this medication..     Chuck Zheng has no history of substance abuse.     The following portions of the patient's history were reviewed and updated as appropriate: allergies, current medications, past family history, past medical history, past social history, past surgical history and problem list.    Review of Systems   Constitutional: Negative for chills and fever.   Eyes: Negative for pain and redness.   Respiratory: Negative for cough and shortness of breath.    Cardiovascular: Negative for chest pain and leg swelling.   Neurological: Negative for dizziness and headaches.       Objective   Physical Exam   Constitutional: He is oriented to person, place, and time. He appears well-developed and well-nourished.   HENT:   Head: Normocephalic and atraumatic.   Right Ear: Tympanic membrane, external ear and ear canal normal.   Left Ear: Tympanic membrane, external ear and ear canal normal.   Nose: Nose normal. Right sinus exhibits no maxillary sinus tenderness and no frontal sinus tenderness. Left sinus exhibits no maxillary sinus tenderness " "and no frontal sinus tenderness.   Mouth/Throat: Uvula is midline, oropharynx is clear and moist and mucous membranes are normal.   Eyes: Conjunctivae and EOM are normal. Pupils are equal, round, and reactive to light. Right eye exhibits no discharge. Left eye exhibits no discharge. No scleral icterus.   Neck: Neck supple. No JVD present.   Cardiovascular: Normal rate, regular rhythm and normal heart sounds. Exam reveals no gallop and no friction rub.   No murmur heard.  Pulmonary/Chest: Effort normal and breath sounds normal. He has no wheezes. He has no rales.   Musculoskeletal: He exhibits tenderness (left shoulder as above). He exhibits no edema.   Positive Neer test and Hewkins-caryl test, some pain in AC joint on the scarf test and some pain in internal rotation - all on the left side.   Lymphadenopathy:     He has no cervical adenopathy.   Neurological: He is alert and oriented to person, place, and time. No cranial nerve deficit.   Skin: Skin is warm and dry. No rash noted.   Psychiatric: He has a normal mood and affect. His behavior is normal.   Vitals reviewed.      Assessment/Plan   Chuck was seen today for hypertension, hyperlipidemia, elevated psa and insomnia.    Diagnoses and all orders for this visit:    Elevated PSA, less than 10 ng/ml  -     Ambulatory Referral to Urology    Benign essential hypertension    Pure hypercholesterolemia  -     Comprehensive Metabolic Panel; Future  -     Lipid Panel; Future    Primary insomnia  -     amitriptyline (ELAVIL) 25 MG tablet; Take 1 tablet by mouth Every Night.      HTN - well controlled with current medication regimen. Normal kidney tests and electrolytes. Recommended low salt diet. Continue same medical treatment.  Hyperlipidemia - not well contrlled with statin. Normal liver function tests. LDL target is below < 70 mg/dl (\"very high\" risk for CHD). Patient's 89. Continue Crestor, but change the dose to 40 mg. Diet discussed. Regular exercise " recommended.  Elevated PSA - the level keeps increasing - will refer to urologist.  Insomnia - staying asleep seems to be a problem. Will try Amitriptyline 25 mg, and is still needs improvement consider Trazodone or Doxepine.

## 2019-05-24 ENCOUNTER — TELEPHONE (OUTPATIENT)
Dept: INTERNAL MEDICINE | Facility: CLINIC | Age: 74
End: 2019-05-24

## 2019-05-24 NOTE — TELEPHONE ENCOUNTER
----- Message from Leeanne Bower sent at 5/24/2019 10:12 AM EDT -----  Contact: Patient  Patient was seen last week.  Dr. Ferrara increased his Crestor to 40 mg a week ago, but patient developed fatigue, nausea, and dizziness.  The patient cut the dose himself back to 20 mg 3 days ago with improvement in symptoms.  Patient inquiring whether 20 mg is okay to use, or if another medication needs to be tried.  Please advise.    Patient:  [phone] - until 4 PM                [phone] - cell after 4 PM    [email or web address] [id number] [place] [address] [address] AT [facility] [address] - [phone]  - [phone] FX

## 2019-08-13 ENCOUNTER — LAB (OUTPATIENT)
Dept: INTERNAL MEDICINE | Facility: CLINIC | Age: 74
End: 2019-08-13

## 2019-08-13 DIAGNOSIS — E78.00 PURE HYPERCHOLESTEROLEMIA: ICD-10-CM

## 2019-08-13 LAB
ALBUMIN SERPL-MCNC: 4.5 G/DL (ref 3.5–5.2)
ALBUMIN/GLOB SERPL: 2.1 G/DL
ALP SERPL-CCNC: 69 U/L (ref 39–117)
ALT SERPL-CCNC: 18 U/L (ref 1–41)
AST SERPL-CCNC: 18 U/L (ref 1–40)
BILIRUB SERPL-MCNC: 0.6 MG/DL (ref 0.2–1.2)
BUN SERPL-MCNC: 14 MG/DL (ref 8–23)
BUN/CREAT SERPL: 14.9 (ref 7–25)
CALCIUM SERPL-MCNC: 9.4 MG/DL (ref 8.6–10.5)
CHLORIDE SERPL-SCNC: 103 MMOL/L (ref 98–107)
CHOLEST SERPL-MCNC: 136 MG/DL (ref 0–200)
CO2 SERPL-SCNC: 31 MMOL/L (ref 22–29)
CREAT SERPL-MCNC: 0.94 MG/DL (ref 0.76–1.27)
GLOBULIN SER CALC-MCNC: 2.1 GM/DL
GLUCOSE SERPL-MCNC: 99 MG/DL (ref 65–99)
HDLC SERPL-MCNC: 54 MG/DL (ref 40–60)
LDLC SERPL CALC-MCNC: 64 MG/DL (ref 0–100)
POTASSIUM SERPL-SCNC: 4.9 MMOL/L (ref 3.5–5.2)
PROT SERPL-MCNC: 6.6 G/DL (ref 6–8.5)
SODIUM SERPL-SCNC: 142 MMOL/L (ref 136–145)
TRIGL SERPL-MCNC: 90 MG/DL (ref 0–150)
VLDLC SERPL-MCNC: 18 MG/DL

## 2019-08-13 PROCEDURE — 36415 COLL VENOUS BLD VENIPUNCTURE: CPT | Performed by: INTERNAL MEDICINE

## 2019-08-20 ENCOUNTER — OFFICE VISIT (OUTPATIENT)
Dept: INTERNAL MEDICINE | Facility: CLINIC | Age: 74
End: 2019-08-20

## 2019-08-20 VITALS
WEIGHT: 166 LBS | SYSTOLIC BLOOD PRESSURE: 104 MMHG | DIASTOLIC BLOOD PRESSURE: 62 MMHG | RESPIRATION RATE: 14 BRPM | BODY MASS INDEX: 25.16 KG/M2 | HEIGHT: 68 IN

## 2019-08-20 DIAGNOSIS — E53.8 COBALAMIN DEFICIENCY: ICD-10-CM

## 2019-08-20 DIAGNOSIS — R97.20 ELEVATED PSA, LESS THAN 10 NG/ML: ICD-10-CM

## 2019-08-20 DIAGNOSIS — Z00.00 HEALTH CARE MAINTENANCE: ICD-10-CM

## 2019-08-20 DIAGNOSIS — R73.01 IMPAIRED FASTING BLOOD SUGAR: ICD-10-CM

## 2019-08-20 DIAGNOSIS — E78.00 PURE HYPERCHOLESTEROLEMIA: Primary | ICD-10-CM

## 2019-08-20 PROCEDURE — 99213 OFFICE O/P EST LOW 20 MIN: CPT | Performed by: INTERNAL MEDICINE

## 2019-08-20 NOTE — PROGRESS NOTES
"Subjective   Chuck Zheng is a 73 y.o. male.     History of Present Illness   Chuck Zheng 73 y.o. male presents today for hyperlipidemia f/u. Last was seen on 05/17/2019 and on that visit it was suggested that medication might need to be changed due to inadequate control.Patient had changed his diet and exercise to much more intense. He stayed on 20 mg of Crestor a day.      /62 (BP Location: Left arm, Patient Position: Sitting, Cuff Size: Adult)   Resp 14   Ht 172.7 cm (68\")   Wt 75.3 kg (166 lb)   BMI 25.24 kg/m²     Current Outpatient Medications:   •  amitriptyline (ELAVIL) 25 MG tablet, Take 1 tablet by mouth Every Night., Disp: 30 tablet, Rfl: 11  •  aspirin (ASPIR) 81 MG EC tablet, Take 81 mg by mouth Every Night. STOPPED 1/2/19, Disp: , Rfl:   •  B Complex Vitamins (VITAMIN B COMPLEX) capsule capsule, Take  by mouth Daily., Disp: , Rfl:   •  carvedilol (COREG) 3.125 MG tablet, TAKE 1 TABLET BY MOUTH TWICE DAILY WITH MEALS, Disp: 180 tablet, Rfl: 1  •  Cetirizine HCl (ZYRTEC ALLERGY) 10 MG capsule, Take 10 mg by mouth Every Morning., Disp: , Rfl:   •  cholecalciferol (VITAMIN D3) 1000 units tablet, Take 1,000 Units by mouth Daily. STOPPED 1/2/19, Disp: , Rfl:   •  Cyanocobalamin (VITAMIN B 12) 100 MCG lozenge, Take 1 tablet by mouth Every Morning. 500 mg daily STOPPED 1/2/19, Disp: , Rfl:   •  fexofenadine (ALLEGRA) 180 MG tablet, Take 180 mg by mouth Every Morning., Disp: , Rfl:   •  Mirabegron ER (MYRBETRIQ) 50 MG tablet sustained-release 24 hour 24 hr tablet, Take 50 mg by mouth Daily. (Patient taking differently: Take 50 mg by mouth Every Morning.), Disp: 90 tablet, Rfl: 3  •  Misc Natural Products (OSTEO BI-FLEX ADV JOINT SHIELD PO), Take  by mouth. STOPPED 1/2/19, Disp: , Rfl:   •  Multiple Vitamin (MULTI VITAMIN DAILY) tablet, Take  by mouth. STOPPED 1/2/19, Disp: , Rfl:   •  Omega-3 Fatty Acids (FISH OIL DOUBLE STRENGTH) 1200 MG capsule, Take  by mouth. STOPPED 1/2/19, Disp: , " Rfl:   •  rosuvastatin (CRESTOR) 40 MG tablet, Take 1 tablet by mouth Daily. (Patient taking differently: Take 40 mg by mouth Every Morning.), Disp: 90 tablet, Rfl: 3  •  zolpidem (AMBIEN) 10 MG tablet, Take 1 tablet by mouth At Night As Needed for Sleep., Disp: 20 tablet, Rfl: 0  The following portions of the patient's history were reviewed and updated as appropriate: allergies, current medications, past family history, past medical history, past social history, past surgical history and problem list.    Review of Systems   Constitutional: Negative for chills and fever.   Eyes: Negative for pain and redness.   Respiratory: Negative for cough and shortness of breath.    Cardiovascular: Negative for chest pain and leg swelling.   Neurological: Negative for dizziness and headaches.       Objective   Physical Exam   Constitutional: He is oriented to person, place, and time. He appears well-developed and well-nourished.   HENT:   Head: Normocephalic and atraumatic.   Right Ear: Tympanic membrane, external ear and ear canal normal.   Left Ear: Tympanic membrane, external ear and ear canal normal.   Nose: Nose normal. Right sinus exhibits no maxillary sinus tenderness and no frontal sinus tenderness. Left sinus exhibits no maxillary sinus tenderness and no frontal sinus tenderness.   Mouth/Throat: Uvula is midline, oropharynx is clear and moist and mucous membranes are normal.   Eyes: Conjunctivae and EOM are normal. Pupils are equal, round, and reactive to light. Right eye exhibits no discharge. Left eye exhibits no discharge. No scleral icterus.   Neck: Neck supple. No JVD present.   Cardiovascular: Normal rate, regular rhythm and normal heart sounds. Exam reveals no gallop and no friction rub.   No murmur heard.  Pulmonary/Chest: Effort normal and breath sounds normal. He has no wheezes. He has no rales.   Musculoskeletal: He exhibits no edema.   Lymphadenopathy:     He has no cervical adenopathy.   Neurological: He is  "alert and oriented to person, place, and time. No cranial nerve deficit.   Skin: Skin is warm and dry. No rash noted.   Psychiatric: He has a normal mood and affect. His behavior is normal.   Vitals reviewed.      Assessment/Plan   Chuck was seen today for hyperlipidemia.    Diagnoses and all orders for this visit:    Pure hypercholesterolemia    Hyperlipidemia - well controlled with current  medication and increased exercise and changes in the diet. Target LDL is  < 70 mg/dl (\"very high\" risk for CHD). Patient's 65. Normal liver function tests. Continue same medication and diet.Regular exercise recommended.  H/o elevate fasting blood sugar - had resolved with better diet and exercise. Will monitor.             "

## 2019-10-03 RX ORDER — ZOLPIDEM TARTRATE 10 MG/1
10 TABLET ORAL NIGHTLY PRN
Qty: 10 TABLET | Refills: 0 | OUTPATIENT
Start: 2019-10-03 | End: 2020-07-23 | Stop reason: SDUPTHER

## 2019-10-03 NOTE — TELEPHONE ENCOUNTER
Kalina patient needs Rm I was not able to log in. He needs refill on zolpidem he is going out of town soon

## 2019-10-18 ENCOUNTER — OFFICE VISIT (OUTPATIENT)
Dept: INTERNAL MEDICINE | Facility: CLINIC | Age: 74
End: 2019-10-18

## 2019-10-18 ENCOUNTER — TELEPHONE (OUTPATIENT)
Dept: INTERNAL MEDICINE | Facility: CLINIC | Age: 74
End: 2019-10-18

## 2019-10-18 VITALS
WEIGHT: 168 LBS | OXYGEN SATURATION: 100 % | TEMPERATURE: 98.2 F | BODY MASS INDEX: 25.46 KG/M2 | SYSTOLIC BLOOD PRESSURE: 120 MMHG | HEIGHT: 68 IN | DIASTOLIC BLOOD PRESSURE: 80 MMHG | HEART RATE: 71 BPM

## 2019-10-18 DIAGNOSIS — J01.10 ACUTE NON-RECURRENT FRONTAL SINUSITIS: Primary | ICD-10-CM

## 2019-10-18 PROCEDURE — 99213 OFFICE O/P EST LOW 20 MIN: CPT | Performed by: NURSE PRACTITIONER

## 2019-10-18 RX ORDER — GUAIFENESIN 600 MG/1
600 TABLET, EXTENDED RELEASE ORAL EVERY 12 HOURS SCHEDULED
Qty: 14 TABLET
Start: 2019-10-18 | End: 2019-10-25

## 2019-10-18 RX ORDER — AMOXICILLIN AND CLAVULANATE POTASSIUM 875; 125 MG/1; MG/1
1 TABLET, FILM COATED ORAL EVERY 12 HOURS SCHEDULED
Qty: 14 TABLET | Refills: 0 | Status: SHIPPED | OUTPATIENT
Start: 2019-10-18 | End: 2019-10-25

## 2019-10-18 RX ORDER — FLUTICASONE PROPIONATE 50 MCG
2 SPRAY, SUSPENSION (ML) NASAL DAILY
Qty: 1 BOTTLE | Refills: 3
Start: 2019-10-18 | End: 2019-11-17

## 2019-10-18 NOTE — PROGRESS NOTES
Subjective   Chuck Zheng is a 73 y.o. male who presents due to respiratory symptoms.    He presents due to worsening sinus pressure and drainage over the past week despite taking Dayquil and Nyquil. He c/o a mild cough which is mainly dry in nature. Denies fever/chills. No nausea/vomiting.      URI    This is a new problem. The current episode started in the past 7 days. The problem has been rapidly worsening. There has been no fever. Associated symptoms include congestion, coughing, rhinorrhea, sinus pain, sneezing and a sore throat. Pertinent negatives include no abdominal pain, chest pain, diarrhea, dysuria, ear pain, nausea, vomiting or wheezing. Treatments tried: sinus rinse.        The following portions of the patient's history were reviewed and updated as appropriate: allergies, current medications, past social history and problem list.    Past Medical History:   Diagnosis Date   • Adhesive capsulitis of shoulder    • Benign non-nodular prostatic hyperplasia 11/21/2016   • Benign prostatic hypertrophy    • Coronary artery disease    • Functional diarrhea 9/13/2017    Resolved with gluten free diet   • H/O cardiovascular stress test     7/14 normal   • Hemorrhoid    • Hyperlipidemia    • Hypertension    • Myocardial infarction (CMS/[facility])      05/2007, distal RCA 80% stenosis, s/p PTCA   • Nephrolithiasis     left kidney   • Obstructive sleep apnea    • Primary insomnia 5/16/2016   • Rupture of flexor tendon of hand    • Syncope     12/2004 neg w/u         Current Outpatient Medications:   •  amitriptyline (ELAVIL) 25 MG tablet, Take 1 tablet by mouth Every Night., Disp: 30 tablet, Rfl: 11  •  aspirin (ASPIR) 81 MG EC tablet, Take 81 mg by mouth Every Night. STOPPED 1/2/19, Disp: , Rfl:   •  B Complex Vitamins (VITAMIN B COMPLEX) capsule capsule, Take  by mouth Daily., Disp: , Rfl:   •  carvedilol (COREG) 3.125 MG tablet, TAKE 1 TABLET BY MOUTH TWICE DAILY WITH MEALS, Disp: 180 tablet, Rfl: 1  •   Cetirizine HCl (ZYRTEC ALLERGY) 10 MG capsule, Take 10 mg by mouth Every Morning., Disp: , Rfl:   •  cholecalciferol (VITAMIN D3) 1000 units tablet, Take 1,000 Units by mouth Daily. STOPPED 1/2/19, Disp: , Rfl:   •  Cyanocobalamin (VITAMIN B 12) 100 MCG lozenge, Take 1 tablet by mouth Every Morning. 500 mg daily STOPPED 1/2/19, Disp: , Rfl:   •  fexofenadine (ALLEGRA) 180 MG tablet, Take 180 mg by mouth Every Morning., Disp: , Rfl:   •  Misc Natural Products (OSTEO BI-FLEX ADV JOINT SHIELD PO), Take  by mouth. STOPPED 1/2/19, Disp: , Rfl:   •  Multiple Vitamin (MULTI VITAMIN DAILY) tablet, Take  by mouth. STOPPED 1/2/19, Disp: , Rfl:   •  Omega-3 Fatty Acids (FISH OIL DOUBLE STRENGTH) 1200 MG capsule, Take  by mouth. STOPPED 1/2/19, Disp: , Rfl:   •  rosuvastatin (CRESTOR) 40 MG tablet, Take 1 tablet by mouth Daily. (Patient taking differently: Take 40 mg by mouth Every Morning.), Disp: 90 tablet, Rfl: 3  •  zolpidem (AMBIEN) 10 MG tablet, Take 1 tablet by mouth At Night As Needed for Sleep., Disp: 10 tablet, Rfl: 0  •  amoxicillin-clavulanate (AUGMENTIN) 875-125 MG per tablet, Take 1 tablet by mouth Every 12 (Twelve) Hours for 7 days., Disp: 14 tablet, Rfl: 0  •  fluticasone (FLONASE) 50 MCG/ACT nasal spray, 2 sprays into the nostril(s) as directed by provider Daily for 30 days., Disp: 1 bottle, Rfl: 3  •  guaiFENesin (MUCINEX) 600 MG 12 hr tablet, Take 1 tablet by mouth Every 12 (Twelve) Hours for 7 days., Disp: 14 tablet, Rfl:     Allergies   Allergen Reactions   • Ace Inhibitors Cough   • Beta Adrenergic Blockers      Can tolerate in low dose   • Cephalexin Other (See Comments)     FELT TERRIBLE   • Sulfa Antibiotics Other (See Comments)     FELT BAD       Review of Systems   Constitutional: Positive for fatigue. Negative for activity change, appetite change, chills, fever and unexpected weight change.   HENT: Positive for congestion, postnasal drip, rhinorrhea, sinus pressure, sinus pain, sneezing and sore  "throat. Negative for drooling, ear pain, facial swelling, hearing loss, mouth sores, nosebleeds, trouble swallowing and voice change.    Eyes: Negative for pain, discharge, itching and visual disturbance.   Respiratory: Positive for cough. Negative for choking, chest tightness, shortness of breath and wheezing.    Cardiovascular: Negative for chest pain and palpitations.   Gastrointestinal: Negative for abdominal pain, constipation, diarrhea, nausea and vomiting.   Endocrine: Negative for polyuria.   Genitourinary: Negative for dysuria and frequency.   Musculoskeletal: Negative for back pain and joint swelling.       Objective   Vitals:    10/18/19 1442   BP: 120/80   BP Location: Left arm   Patient Position: Sitting   Cuff Size: Adult   Pulse: 71   Temp: 98.2 °F (36.8 °C)   TempSrc: Oral   SpO2: 100%   Weight: 76.2 kg (168 lb)   Height: 172.7 cm (68\")     Physical Exam   Constitutional: He appears well-developed and well-nourished. He is cooperative. He does not have a sickly appearance. He does not appear ill.   HENT:   Head: Normocephalic.   Right Ear: Hearing, tympanic membrane and external ear normal. No drainage, swelling or tenderness. Tympanic membrane is not injected, not erythematous and not bulging. No middle ear effusion.   Left Ear: Hearing, tympanic membrane and external ear normal. No drainage, swelling or tenderness. Tympanic membrane is not injected, not erythematous and not bulging.  No middle ear effusion.   Nose: No mucosal edema, rhinorrhea or sinus tenderness. Right sinus exhibits frontal sinus tenderness. Right sinus exhibits no maxillary sinus tenderness. Left sinus exhibits frontal sinus tenderness. Left sinus exhibits no maxillary sinus tenderness.   Mouth/Throat: Mucous membranes are normal. Posterior oropharyngeal erythema present.   Eyes: Conjunctivae and lids are normal. Right eye exhibits no discharge and no exudate. Left eye exhibits no discharge and no exudate.   Neck: Trachea normal " and normal range of motion. Edema present.   Cardiovascular: Regular rhythm, normal heart sounds and normal pulses.   No murmur heard.  Pulmonary/Chest: Breath sounds normal. No respiratory distress. He has no decreased breath sounds. He has no wheezes. He has no rhonchi. He has no rales.   Lymphadenopathy:     He has no cervical adenopathy.   Neurological: He is alert.   Skin: Skin is warm, dry and intact.   Nursing note and vitals reviewed.      Assessment/Plan   Chuck was seen today for sinus problem.    Diagnoses and all orders for this visit:    Acute non-recurrent frontal sinusitis  -     amoxicillin-clavulanate (AUGMENTIN) 875-125 MG per tablet; Take 1 tablet by mouth Every 12 (Twelve) Hours for 7 days.  -     guaiFENesin (MUCINEX) 600 MG 12 hr tablet; Take 1 tablet by mouth Every 12 (Twelve) Hours for 7 days.  -     fluticasone (FLONASE) 50 MCG/ACT nasal spray; 2 sprays into the nostril(s) as directed by provider Daily for 30 days.    Discussed side effects of decongestant in Dayquil which include elevated blood pressure and prostate irritation, recommended switching to Flonase and Mucinex bid.

## 2019-10-18 NOTE — TELEPHONE ENCOUNTER
----- Message from Shelby Senior sent at 10/18/2019 10:56 AM EDT -----  Contact: Pt   Pt is calling to get something called in.  States he has taken day quill and delsym   Symptoms  Coughing.  Congestion.  Green mucus.   No fever    Pt# [phone]

## 2019-10-29 DIAGNOSIS — I25.10 ATHEROSCLEROSIS OF NATIVE CORONARY ARTERY OF NATIVE HEART WITHOUT ANGINA PECTORIS: ICD-10-CM

## 2019-10-30 RX ORDER — CARVEDILOL 3.12 MG/1
TABLET ORAL
Qty: 180 TABLET | Refills: 1 | Status: SHIPPED | OUTPATIENT
Start: 2019-10-30 | End: 2020-05-11

## 2019-12-24 DIAGNOSIS — E78.00 PURE HYPERCHOLESTEROLEMIA: ICD-10-CM

## 2019-12-26 RX ORDER — ROSUVASTATIN CALCIUM 40 MG/1
40 TABLET, COATED ORAL EVERY MORNING
Qty: 90 TABLET | Refills: 1 | Status: SHIPPED | OUTPATIENT
Start: 2019-12-26

## 2020-01-08 ENCOUNTER — LAB (OUTPATIENT)
Dept: INTERNAL MEDICINE | Facility: CLINIC | Age: 75
End: 2020-01-08

## 2020-01-08 DIAGNOSIS — R97.20 ELEVATED PSA, LESS THAN 10 NG/ML: ICD-10-CM

## 2020-01-08 DIAGNOSIS — E53.8 COBALAMIN DEFICIENCY: ICD-10-CM

## 2020-01-08 DIAGNOSIS — Z00.00 HEALTH CARE MAINTENANCE: ICD-10-CM

## 2020-01-08 LAB
ALBUMIN SERPL-MCNC: 4.4 G/DL (ref 3.5–5.2)
ALBUMIN/GLOB SERPL: 1.8 G/DL
ALP SERPL-CCNC: 71 U/L (ref 39–117)
ALT SERPL-CCNC: 19 U/L (ref 1–41)
AST SERPL-CCNC: 19 U/L (ref 1–40)
BASOPHILS # BLD AUTO: 0.04 10*3/MM3 (ref 0–0.2)
BASOPHILS NFR BLD AUTO: 1 % (ref 0–1.5)
BILIRUB SERPL-MCNC: 0.5 MG/DL (ref 0.2–1.2)
BUN SERPL-MCNC: 17 MG/DL (ref 8–23)
BUN/CREAT SERPL: 14.5 (ref 7–25)
CALCIUM SERPL-MCNC: 9.4 MG/DL (ref 8.6–10.5)
CHLORIDE SERPL-SCNC: 104 MMOL/L (ref 98–107)
CHOLEST SERPL-MCNC: 149 MG/DL (ref 0–200)
CO2 SERPL-SCNC: 28.5 MMOL/L (ref 22–29)
CREAT SERPL-MCNC: 1.17 MG/DL (ref 0.76–1.27)
EOSINOPHIL # BLD AUTO: 0.06 10*3/MM3 (ref 0–0.4)
EOSINOPHIL # BLD AUTO: 1.4 % (ref 0.3–6.2)
ERYTHROCYTE [DISTWIDTH] IN BLOOD BY AUTOMATED COUNT: 13.7 % (ref 12.3–15.4)
GLOBULIN SER CALC-MCNC: 2.4 GM/DL
GLUCOSE SERPL-MCNC: 100 MG/DL (ref 65–99)
HCT VFR BLD AUTO: 42 % (ref 37.5–51)
HDLC SERPL-MCNC: 56 MG/DL (ref 40–60)
HGB BLD-MCNC: 13.5 G/DL (ref 13–17.7)
IMM GRANULOCYTES # BLD: 0.02 10*3/MM3 (ref 0–0.05)
IMM GRANULOCYTES NFR BLD: 0.5 % (ref 0–0.5)
LDLC SERPL CALC-MCNC: 78 MG/DL (ref 0–100)
LYMPHOCYTES # BLD AUTO: 1.13 10*3/MM3 (ref 0.7–3.1)
LYMPHOCYTES NFR BLD AUTO: 27.3 % (ref 19.6–45.3)
MCH RBC QN AUTO: 28.5 PG (ref 26.6–33)
MCHC RBC AUTO-ENTMCNC: 32.1 G/DL (ref 31.5–35.7)
MCV RBC AUTO: 88.8 FL (ref 79–97)
MONOCYTES # BLD AUTO: 0.5 10*3/MM3 (ref 0.1–0.9)
MONOCYTES NFR BLD AUTO: 12.1 % (ref 5–12)
NEUTROPHILS # BLD AUTO: 2.39 10*3/MM3 (ref 1.7–7)
NEUTROPHILS NFR BLD AUTO: 57.7 % (ref 42.7–76)
NRBC BLD AUTO-RTO: 0 /100 WBC (ref 0–0.2)
PLATELET # BLD AUTO: 207 10*3/MM3 (ref 140–450)
POTASSIUM SERPL-SCNC: 5 MMOL/L (ref 3.5–5.2)
PROT SERPL-MCNC: 6.8 G/DL (ref 6–8.5)
RBC # BLD AUTO: 4.73 10*6/MM3 (ref 4.14–5.8)
SODIUM SERPL-SCNC: 145 MMOL/L (ref 136–145)
TRIGL SERPL-MCNC: 74 MG/DL (ref 0–150)
VLDLC SERPL-MCNC: 14.8 MG/DL
WBC # BLD AUTO: 4.14 10*3/MM3 (ref 3.4–10.8)

## 2020-01-09 LAB
APPEARANCE UR: ABNORMAL
BILIRUB UR QL STRIP: NEGATIVE
COLOR UR: ABNORMAL
GLUCOSE UR QL: NEGATIVE
HGB UR QL STRIP: NEGATIVE
KETONES UR QL STRIP: NEGATIVE
LEUKOCYTE ESTERASE UR QL STRIP: NEGATIVE
NITRITE UR QL STRIP: NEGATIVE
PH UR STRIP.AUTO: <=5 [PH] (ref 5–8)
PROTEIN: NEGATIVE
PSA SERPL-MCNC: 5.2 NG/ML (ref 0–4)
SP GR UR: 1.03 (ref 1–1.03)
TSH SERPL-ACNC: 1.3 UIU/ML (ref 0.27–4.2)
UROBILINOGEN UR STRIP-MCNC: ABNORMAL MG/DL
VIT B12 SERPL-MCNC: 661 PG/ML (ref 211–946)

## 2020-01-14 ENCOUNTER — OFFICE VISIT (OUTPATIENT)
Dept: INTERNAL MEDICINE | Facility: CLINIC | Age: 75
End: 2020-01-14

## 2020-01-14 VITALS
HEIGHT: 68 IN | DIASTOLIC BLOOD PRESSURE: 72 MMHG | BODY MASS INDEX: 25.31 KG/M2 | WEIGHT: 167 LBS | RESPIRATION RATE: 14 BRPM | SYSTOLIC BLOOD PRESSURE: 138 MMHG

## 2020-01-14 DIAGNOSIS — R97.20 ELEVATED PSA, LESS THAN 10 NG/ML: ICD-10-CM

## 2020-01-14 DIAGNOSIS — Z00.00 HEALTH CARE MAINTENANCE: Primary | ICD-10-CM

## 2020-01-14 DIAGNOSIS — E78.00 PURE HYPERCHOLESTEROLEMIA: ICD-10-CM

## 2020-01-14 PROCEDURE — 99397 PER PM REEVAL EST PAT 65+ YR: CPT | Performed by: INTERNAL MEDICINE

## 2020-01-14 RX ORDER — TAMSULOSIN HYDROCHLORIDE 0.4 MG/1
CAPSULE ORAL
COMMUNITY
Start: 2019-11-28

## 2020-01-14 NOTE — PROGRESS NOTES
"Subjective   Chuck Zheng is a 74 y.o. male.     History of Present Illness   /72 (BP Location: Left arm, Patient Position: Sitting, Cuff Size: Adult)   Resp 14   Ht 172.7 cm (68\")   Wt 75.8 kg (167 lb)   BMI 25.39 kg/m²   Patient is here for yearly CPE. Last CPE was 1 year ago.  PMH, SH and FH reviewed. Changes in the FH: none.  Patient rates his health as good.  Tobacco use: none.  Alcohol use: seldom.  Recreational drugs use: none.  Medication list rewieved.  Diet: well balanced.  Patient exercises  3-4 days a week, resistance training and aerobic exercise.  Marital status: .  Employment: full time.  Patient rates his stress level as low.  Dental health: good. Brushes teeth 2 times a day, flosses once a day. Dental visits 2 times a year.  Vision correction: reading glasses.  Hearing: normal.    Recent vaccinations:   flu   is up to date and recommended yearly  Tdap  is up to date  Shingles prevention completed.      Current Outpatient Medications:   •  amitriptyline (ELAVIL) 25 MG tablet, Take 1 tablet by mouth Every Night., Disp: 30 tablet, Rfl: 11  •  aspirin (ASPIR) 81 MG EC tablet, Take 81 mg by mouth Every Night. STOPPED 1/2/19, Disp: , Rfl:   •  B Complex Vitamins (VITAMIN B COMPLEX) capsule capsule, Take  by mouth Daily., Disp: , Rfl:   •  carvedilol (COREG) 3.125 MG tablet, TAKE 1 TABLET BY MOUTH TWICE DAILY WITH MEALS, Disp: 180 tablet, Rfl: 1  •  Cetirizine HCl (ZYRTEC ALLERGY) 10 MG capsule, Take 10 mg by mouth Every Morning., Disp: , Rfl:   •  cholecalciferol (VITAMIN D3) 1000 units tablet, Take 1,000 Units by mouth Daily. STOPPED 1/2/19, Disp: , Rfl:   •  Cyanocobalamin (VITAMIN B 12) 100 MCG lozenge, Take 1 tablet by mouth Every Morning. 500 mg daily STOPPED 1/2/19, Disp: , Rfl:   •  fexofenadine (ALLEGRA) 180 MG tablet, Take 180 mg by mouth Every Morning., Disp: , Rfl:   •  Misc Natural Products (OSTEO BI-FLEX ADV JOINT SHIELD PO), Take  by mouth. STOPPED 1/2/19, Disp: , Rfl: "   •  Multiple Vitamin (MULTI VITAMIN DAILY) tablet, Take  by mouth. STOPPED 1/2/19, Disp: , Rfl:   •  Omega-3 Fatty Acids (FISH OIL DOUBLE STRENGTH) 1200 MG capsule, Take  by mouth. STOPPED 1/2/19, Disp: , Rfl:   •  rosuvastatin (CRESTOR) 40 MG tablet, Take 1 tablet by mouth Every Morning., Disp: 90 tablet, Rfl: 1  •  tamsulosin (FLOMAX) 0.4 MG capsule 24 hr capsule, , Disp: , Rfl:   •  zolpidem (AMBIEN) 10 MG tablet, Take 1 tablet by mouth At Night As Needed for Sleep., Disp: 10 tablet, Rfl: 0                              The following portions of the patient's history were reviewed and updated as appropriate: allergies, current medications, past family history, past medical history, past social history, past surgical history and problem list.    Review of Systems   Constitutional: Negative for chills and fever.   HENT: Negative for postnasal drip, sinus pressure and sore throat.    Eyes: Negative for pain and itching.   Respiratory: Negative for cough and chest tightness.    Cardiovascular: Negative for chest pain and leg swelling.   Gastrointestinal: Negative for abdominal pain and blood in stool.   Endocrine: Negative for cold intolerance and heat intolerance.   Genitourinary: Negative for difficulty urinating and flank pain.   Musculoskeletal: Negative for back pain and neck pain.   Skin: Negative for color change and rash.   Neurological: Negative for dizziness, weakness and headaches.   Hematological: Negative for adenopathy. Does not bruise/bleed easily.   Psychiatric/Behavioral: Positive for sleep disturbance. The patient is not nervous/anxious.        Objective   Physical Exam   Constitutional: He is oriented to person, place, and time. Vital signs are normal. He appears well-developed and well-nourished. No distress.   HENT:   Head: Normocephalic and atraumatic.   Right Ear: External ear normal.   Left Ear: External ear normal.   Nose: Nose normal. No mucosal edema. Right sinus exhibits no maxillary sinus  tenderness and no frontal sinus tenderness. Left sinus exhibits no maxillary sinus tenderness and no frontal sinus tenderness.   Mouth/Throat: Oropharynx is clear and moist. No oropharyngeal exudate.   Eyes: Pupils are equal, round, and reactive to light. Conjunctivae, EOM and lids are normal. Right eye exhibits no discharge. Left eye exhibits no discharge. Right conjunctiva is not injected. Left conjunctiva is not injected. No scleral icterus. Right eye exhibits normal extraocular motion. Left eye exhibits normal extraocular motion.   Neck: Normal range of motion and full passive range of motion without pain. Neck supple. No JVD present. Carotid bruit is not present. No thyromegaly present.   Cardiovascular: Normal rate, regular rhythm, S1 normal, S2 normal, normal heart sounds and intact distal pulses. PMI is not displaced. Exam reveals no gallop and no friction rub.   No murmur heard.  Pulmonary/Chest: Effort normal and breath sounds normal. No accessory muscle usage. No respiratory distress. He has no decreased breath sounds. He has no wheezes. He has no rhonchi. He has no rales.   Abdominal: Soft. Bowel sounds are normal. He exhibits no distension, no pulsatile liver, no fluid wave, no abdominal bruit, no ascites and no mass. There is no tenderness. There is no rebound and no guarding.   Musculoskeletal: He exhibits no edema or deformity.   Lymphadenopathy:        Head (right side): No submental, no submandibular, no preauricular, no posterior auricular and no occipital adenopathy present.        Head (left side): No submental, no submandibular, no tonsillar, no preauricular, no posterior auricular and no occipital adenopathy present.     He has no cervical adenopathy.        Right cervical: No superficial cervical, no deep cervical and no posterior cervical adenopathy present.       Left cervical: No superficial cervical, no deep cervical and no posterior cervical adenopathy present.        Right: No  supraclavicular adenopathy present.        Left: No supraclavicular adenopathy present.   Neurological: He is alert and oriented to person, place, and time. He has normal strength and normal reflexes. He displays normal reflexes. No cranial nerve deficit. He exhibits normal muscle tone. Coordination normal.   Reflex Scores:       Bicep reflexes are 2+ on the right side and 2+ on the left side.       Patellar reflexes are 2+ on the right side and 2+ on the left side.  Skin: Skin is warm and dry. No rash noted. He is not diaphoretic. No erythema.   Psychiatric: He has a normal mood and affect. His speech is normal and behavior is normal. Thought content normal.   Vitals reviewed.      Assessment/Plan   Chuck was seen today for annual exam.    Diagnoses and all orders for this visit:    Health care maintenance    Elevated PSA, less than 10 ng/ml  -     PSA DIAGNOSTIC; Future    Pure hypercholesterolemia  -     Lipid Panel; Future  -     Comprehensive Metabolic Panel; Future          Risk evaluation:  1. Cardiovascular risk factors: patient has CAD and is being treated.  2. Diabetes risk factors: impaired fasting blood sugars.   3. Cancer risk factors: FH of colon polyps.  4. Risky behavior: none. Use of seat belts: regular. Use of sunscreens: regular. SPF 50.   Tattoos: none.   H/o blood transfusion/organ transplant before 1992 or clotting factors transfusion before 1987: none.    Prevention:   Cholesterol test up to date. Cholesterol is mildly elevated. Target LDL is below 70, patient is up from 64 to 78. Needs to continue exercise and to try to stay on better diet..  Blood sugar test up to date. Fasting blood sugar Recommended to avoid sweets and starches: pasta, pizza, bread, potato, corn.  Fasting blood sugar was borderline at 100, will monitor..  Hep C testing (for those born [id number]): completed..  Colonoscopy up to date. Recommended every 10 years. Next screening is due in 2024..  Prostate cancer screening and  "prevention: patient is under the care of urologist. Psa had improved a bit..    HTN - well controlled with current medication regimen. Normal kidney tests and electrolytes. Recommended low salt diet. Continue same medical treatment.  Hyperlipidemia - well controlled with statin. Normal liver function tests. LDL target is below < 70 mg/dl (\"very high\" risk for CHD). Patient's is mildly elevated this time at 78 after the holidays. Continue same treatment. Regular exercise recommended.  Borderline fasting blood sugar. Low carb diet and regular exercise recommended.   Few pounds weight loss will be very beneficial.   Coronary artery disease-patient is asymptomatic, good exercise tolerance.  Will continue current efforts for cholesterol, blood pressure and blood sugar control.  Target LDL is below 70.  Mildly elevated PSA-patient had been seen by urologist, and had unremarkable work-up.  We will repeat PSA in 6 months.  No need for interventionsat that time     "

## 2020-01-14 NOTE — PATIENT INSTRUCTIONS
"Risk evaluation:  1. Cardiovascular risk factors: patient has CAD and is being treated.  2. Diabetes risk factors: impaired fasting blood sugars.   3. Cancer risk factors: FH of colon polyps.  4. Risky behavior: none. Use of seat belts: regular. Use of sunscreens: regular. SPF 50.   Tattoos: none.   H/o blood transfusion/organ transplant before 1992 or clotting factors transfusion before 1987: none.    Prevention:   Cholesterol test up to date. Cholesterol is mildly elevated. Target LDL is below 70, patient is up from 64 to 78. Needs to continue exercise and to try to stay on better diet..  Blood sugar test up to date. Fasting blood sugar Recommended to avoid sweets and starches: pasta, pizza, bread, potato, corn.  Fasting blood sugar was borderline at 100, will monitor..  Hep C testing (for those born [id number]): completed..  Colonoscopy up to date. Recommended every 10 years. Next screening is due in 2024..  Prostate cancer screening and prevention: patient is under the care of urologist. Psa had improved a bit..    HTN - well controlled with current medication regimen. Normal kidney tests and electrolytes. Recommended low salt diet. Continue same medical treatment.  Hyperlipidemia - well controlled with statin. Normal liver function tests. LDL target is below < 70 mg/dl (\"very high\" risk for CHD). Patient's is mildly elevated this time at 78 after the holidays. Continue same treatment. Regular exercise recommended.  Borderline fasting blood sugar. Low carb diet and regular exercise recommended.   Few pounds weight loss will be very beneficial.   Coronary artery disease-patient is asymptomatic, good exercise tolerance.  Will continue current efforts for cholesterol, blood pressure and blood sugar control.  Target LDL is below 70.  Mildly elevated PSA-patient had been seen by urologist, and had unremarkable work-up.  We will repeat PSA in 6 months.  No need for interventionsat that time    "

## 2020-02-05 ENCOUNTER — OFFICE VISIT (OUTPATIENT)
Dept: SLEEP MEDICINE | Facility: HOSPITAL | Age: 75
End: 2020-02-05

## 2020-02-05 VITALS — WEIGHT: 166 LBS | HEIGHT: 68 IN | BODY MASS INDEX: 25.16 KG/M2

## 2020-02-05 DIAGNOSIS — G47.33 OSA ON CPAP: Primary | ICD-10-CM

## 2020-02-05 DIAGNOSIS — Z99.89 OSA ON CPAP: Primary | ICD-10-CM

## 2020-02-05 PROCEDURE — G0463 HOSPITAL OUTPT CLINIC VISIT: HCPCS

## 2020-02-05 PROCEDURE — 99213 OFFICE O/P EST LOW 20 MIN: CPT | Performed by: INTERNAL MEDICINE

## 2020-02-05 NOTE — PROGRESS NOTES
"[facility] Up [facility] Note     Chief Complaint:  PLACIDO     Primary Care Physician: Patricia Ferrara MD    Interval History:   I last saw the patient 1 year ago.  He is stable without new complaints.  He goes to bed at 10:30 PM and awakens at 7 AM.  He will use the restroom once during the nighttime.  [place] Sleepiness Scale is normal at 6    Review of Systems:    A complete review of systems was done and all were negative with the exception of the above    Social History:    Social History     Socioeconomic History   • Marital status:      Spouse name: Not on file   • Number of children: Not on file   • Years of education: Not on file   • Highest education level: Not on file   Tobacco Use   • Smoking status: Never Smoker   • Smokeless tobacco: Never Used   Substance and Sexual Activity   • Alcohol use: No     Frequency: Never     Comment: 7 drinks weekly   • Drug use: No       Allergies:  Ace inhibitors; Beta adrenergic blockers; Cephalexin; and Sulfa antibiotics     Medication Review:  Reviewed.      Vital Signs:    Vitals:    02/05/20 0843   Weight: 75.3 kg (166 lb)   Height: 172.7 cm (68\")     Body mass index is 25.24 kg/m².    Physical Exam:    Constitutional:  Well developed 74 y.o. male that appears in no apparent distress.  Awake & oriented times 3.  Normal mood with normal recent and remote memory and normal judgement.  Eyes:  Conjunctivae normal.  Oropharynx:  moist mucous membranes without exudate and a normal sized tongue and previous UPPP and mild narrowing of the posterior pharyngeal opening and class II-3 Mallampati airway     Results Review:  DME is [facility] and he uses a fullface mask.  Downloads between 11/5 and 2/2/2020 compliance 86%.  The patient uses a travel CPAP for compliance of 100%.  Average usage is 9 hours and 9 minutes.  Average AHI is normal without leak.  CPAP pressure is +11.       Impression:   Obstructive sleep apnea adequately treated with CPAP +11 and a travel " CPAP with good compliance and usage and no complaints of hypersomnolence.    Plan:  Good sleep hygiene measures should be maintained.  Some weight loss would be beneficial in this patient who is overweight by BMI.  The patient is benefiting from the treatment being provided.     The patient will continue CPAP and his travel CPAP and a new prescription will be sent to his DME. An Air Fit F 30 medium will be ordered    The patient will call for any problems and will follow up in 1 year.      Kaden Call MD  Sleep Medicine  02/05/20  9:01 AM

## 2020-03-13 ENCOUNTER — OFFICE VISIT (OUTPATIENT)
Dept: INTERNAL MEDICINE | Facility: CLINIC | Age: 75
End: 2020-03-13

## 2020-03-13 VITALS
WEIGHT: 171 LBS | HEIGHT: 68 IN | SYSTOLIC BLOOD PRESSURE: 136 MMHG | OXYGEN SATURATION: 97 % | HEART RATE: 80 BPM | DIASTOLIC BLOOD PRESSURE: 78 MMHG | BODY MASS INDEX: 25.91 KG/M2

## 2020-03-13 DIAGNOSIS — R42 EPISODIC LIGHTHEADEDNESS: Primary | ICD-10-CM

## 2020-03-13 DIAGNOSIS — H53.9 VISUAL CHANGES: ICD-10-CM

## 2020-03-13 DIAGNOSIS — I10 BENIGN ESSENTIAL HYPERTENSION: ICD-10-CM

## 2020-03-13 PROCEDURE — 99214 OFFICE O/P EST MOD 30 MIN: CPT | Performed by: NURSE PRACTITIONER

## 2020-03-14 LAB
BASOPHILS # BLD AUTO: 0.03 10*3/MM3 (ref 0–0.2)
BASOPHILS NFR BLD AUTO: 0.6 % (ref 0–1.5)
BUN SERPL-MCNC: 18 MG/DL (ref 8–23)
BUN/CREAT SERPL: 20.2 (ref 7–25)
CALCIUM SERPL-MCNC: 9.2 MG/DL (ref 8.6–10.5)
CHLORIDE SERPL-SCNC: 103 MMOL/L (ref 98–107)
CO2 SERPL-SCNC: 27 MMOL/L (ref 22–29)
CREAT SERPL-MCNC: 0.89 MG/DL (ref 0.76–1.27)
EOSINOPHIL # BLD AUTO: 0.11 10*3/MM3 (ref 0–0.4)
EOSINOPHIL NFR BLD AUTO: 2.1 % (ref 0.3–6.2)
ERYTHROCYTE [DISTWIDTH] IN BLOOD BY AUTOMATED COUNT: 13.1 % (ref 12.3–15.4)
GLUCOSE SERPL-MCNC: 110 MG/DL (ref 65–99)
HCT VFR BLD AUTO: 41.1 % (ref 37.5–51)
HGB BLD-MCNC: 13.1 G/DL (ref 13–17.7)
IMM GRANULOCYTES # BLD AUTO: 0.02 10*3/MM3 (ref 0–0.05)
IMM GRANULOCYTES NFR BLD AUTO: 0.4 % (ref 0–0.5)
LYMPHOCYTES # BLD AUTO: 1.32 10*3/MM3 (ref 0.7–3.1)
LYMPHOCYTES NFR BLD AUTO: 25.3 % (ref 19.6–45.3)
MCH RBC QN AUTO: 28 PG (ref 26.6–33)
MCHC RBC AUTO-ENTMCNC: 31.9 G/DL (ref 31.5–35.7)
MCV RBC AUTO: 87.8 FL (ref 79–97)
MONOCYTES # BLD AUTO: 0.62 10*3/MM3 (ref 0.1–0.9)
MONOCYTES NFR BLD AUTO: 11.9 % (ref 5–12)
NEUTROPHILS # BLD AUTO: 3.12 10*3/MM3 (ref 1.7–7)
NEUTROPHILS NFR BLD AUTO: 59.7 % (ref 42.7–76)
NRBC BLD AUTO-RTO: 0 /100 WBC (ref 0–0.2)
PLATELET # BLD AUTO: 186 10*3/MM3 (ref 140–450)
POTASSIUM SERPL-SCNC: 4.1 MMOL/L (ref 3.5–5.2)
RBC # BLD AUTO: 4.68 10*6/MM3 (ref 4.14–5.8)
SODIUM SERPL-SCNC: 140 MMOL/L (ref 136–145)
WBC # BLD AUTO: 5.22 10*3/MM3 (ref 3.4–10.8)

## 2020-03-16 ENCOUNTER — TRANSCRIBE ORDERS (OUTPATIENT)
Dept: ADMINISTRATIVE | Facility: HOSPITAL | Age: 75
End: 2020-03-16

## 2020-03-16 DIAGNOSIS — Z13.6 ENCOUNTER FOR SCREENING FOR VASCULAR DISEASE: Primary | ICD-10-CM

## 2020-03-25 ENCOUNTER — APPOINTMENT (OUTPATIENT)
Dept: CARDIOLOGY | Facility: HOSPITAL | Age: 75
End: 2020-03-25

## 2020-05-09 DIAGNOSIS — F51.01 PRIMARY INSOMNIA: ICD-10-CM

## 2020-05-09 DIAGNOSIS — I25.10 ATHEROSCLEROSIS OF NATIVE CORONARY ARTERY OF NATIVE HEART WITHOUT ANGINA PECTORIS: ICD-10-CM

## 2020-05-11 RX ORDER — AMITRIPTYLINE HYDROCHLORIDE 25 MG/1
25 TABLET, FILM COATED ORAL NIGHTLY
Qty: 30 TABLET | Refills: 11 | Status: SHIPPED | OUTPATIENT
Start: 2020-05-11

## 2020-05-11 RX ORDER — CARVEDILOL 3.12 MG/1
TABLET ORAL
Qty: 180 TABLET | Refills: 1 | Status: SHIPPED | OUTPATIENT
Start: 2020-05-11

## 2020-07-14 ENCOUNTER — LAB (OUTPATIENT)
Dept: INTERNAL MEDICINE | Facility: CLINIC | Age: 75
End: 2020-07-14

## 2020-07-14 DIAGNOSIS — R97.20 ELEVATED PSA, LESS THAN 10 NG/ML: ICD-10-CM

## 2020-07-14 DIAGNOSIS — E78.00 PURE HYPERCHOLESTEROLEMIA: Primary | ICD-10-CM

## 2020-07-14 LAB
ALBUMIN SERPL-MCNC: 4.4 G/DL (ref 3.5–5.2)
ALBUMIN/GLOB SERPL: 2.6 G/DL
ALP SERPL-CCNC: 62 U/L (ref 39–117)
ALT SERPL-CCNC: 17 U/L (ref 1–41)
AST SERPL-CCNC: 18 U/L (ref 1–40)
BILIRUB SERPL-MCNC: 0.5 MG/DL (ref 0–1.2)
BUN SERPL-MCNC: 15 MG/DL (ref 8–23)
BUN/CREAT SERPL: 13.9 (ref 7–25)
CALCIUM SERPL-MCNC: 9.1 MG/DL (ref 8.6–10.5)
CHLORIDE SERPL-SCNC: 105 MMOL/L (ref 98–107)
CHOLEST SERPL-MCNC: 150 MG/DL (ref 0–200)
CO2 SERPL-SCNC: 29.4 MMOL/L (ref 22–29)
CREAT SERPL-MCNC: 1.08 MG/DL (ref 0.76–1.27)
GLOBULIN SER CALC-MCNC: 1.7 GM/DL
GLUCOSE SERPL-MCNC: 105 MG/DL (ref 65–99)
HDLC SERPL-MCNC: 55 MG/DL (ref 40–60)
LDLC SERPL CALC-MCNC: 72 MG/DL (ref 0–100)
Lab: NORMAL
POTASSIUM SERPL-SCNC: 4.5 MMOL/L (ref 3.5–5.2)
PROT SERPL-MCNC: 6.1 G/DL (ref 6–8.5)
PSA SERPL-MCNC: 5.58 NG/ML (ref 0–4)
SODIUM SERPL-SCNC: 142 MMOL/L (ref 136–145)
TRIGL SERPL-MCNC: 115 MG/DL (ref 0–150)
VLDLC SERPL CALC-MCNC: 23 MG/DL

## 2020-07-23 ENCOUNTER — OFFICE VISIT (OUTPATIENT)
Dept: INTERNAL MEDICINE | Facility: CLINIC | Age: 75
End: 2020-07-23

## 2020-07-23 VITALS
HEIGHT: 68 IN | DIASTOLIC BLOOD PRESSURE: 78 MMHG | OXYGEN SATURATION: 97 % | BODY MASS INDEX: 24.4 KG/M2 | SYSTOLIC BLOOD PRESSURE: 120 MMHG | WEIGHT: 161 LBS | HEART RATE: 76 BPM

## 2020-07-23 DIAGNOSIS — E78.00 PURE HYPERCHOLESTEROLEMIA: ICD-10-CM

## 2020-07-23 DIAGNOSIS — F51.01 PRIMARY INSOMNIA: ICD-10-CM

## 2020-07-23 DIAGNOSIS — I10 BENIGN ESSENTIAL HYPERTENSION: Primary | ICD-10-CM

## 2020-07-23 PROCEDURE — 99214 OFFICE O/P EST MOD 30 MIN: CPT | Performed by: NURSE PRACTITIONER

## 2020-07-23 RX ORDER — ZOLPIDEM TARTRATE 10 MG/1
10 TABLET ORAL NIGHTLY PRN
Qty: 30 TABLET | Refills: 0 | Status: SHIPPED | OUTPATIENT
Start: 2020-07-23

## 2020-09-25 ENCOUNTER — TRANSCRIBE ORDERS (OUTPATIENT)
Dept: ADMINISTRATIVE | Facility: HOSPITAL | Age: 75
End: 2020-09-25

## 2020-09-25 DIAGNOSIS — Z13.6 ENCOUNTER FOR SCREENING FOR VASCULAR DISEASE: Primary | ICD-10-CM

## 2020-10-14 ENCOUNTER — HOSPITAL ENCOUNTER (OUTPATIENT)
Dept: CARDIOLOGY | Facility: HOSPITAL | Age: 75
Discharge: HOME | End: 2020-10-14
Admitting: FAMILY MEDICINE

## 2020-10-14 VITALS
HEART RATE: 74 BPM | WEIGHT: 160 LBS | SYSTOLIC BLOOD PRESSURE: 128 MMHG | OXYGEN SATURATION: 98 % | DIASTOLIC BLOOD PRESSURE: 70 MMHG | BODY MASS INDEX: 24.25 KG/M2 | HEIGHT: 68 IN

## 2020-10-14 DIAGNOSIS — Z13.6 ENCOUNTER FOR SCREENING FOR VASCULAR DISEASE: ICD-10-CM

## 2020-10-14 LAB
BH CV ECHO MEAS - DIST AO DIAM: 1.54 CM
BH CV VAS BP LEFT ARM: NORMAL MMHG
BH CV VAS BP RIGHT ARM: NORMAL MMHG
BH CV XLRA MEAS - MID AO DIAM: 1.66 CM
BH CV XLRA MEAS - PAD LEFT ABI DP: 1.17
BH CV XLRA MEAS - PAD LEFT ABI PT: 1.25
BH CV XLRA MEAS - PAD LEFT ARM: 124 MMHG
BH CV XLRA MEAS - PAD LEFT LEG DP: 150 MMHG
BH CV XLRA MEAS - PAD LEFT LEG PT: 160 MMHG
BH CV XLRA MEAS - PAD RIGHT ABI DP: 1.17
BH CV XLRA MEAS - PAD RIGHT ABI PT: 1.25
BH CV XLRA MEAS - PAD RIGHT ARM: 128 MMHG
BH CV XLRA MEAS - PAD RIGHT LEG DP: 150 MMHG
BH CV XLRA MEAS - PAD RIGHT LEG PT: 160 MMHG
BH CV XLRA MEAS - PROX AO DIAM: 1.68 CM
BH CV XLRA MEAS LEFT ICA/CCA RATIO: 1.03
BH CV XLRA MEAS LEFT MID CCA PSV: NORMAL CM/SEC
BH CV XLRA MEAS LEFT MID ICA PSV: NORMAL CM/SEC
BH CV XLRA MEAS LEFT PROX ECA PSV: NORMAL CM/SEC
BH CV XLRA MEAS RIGHT ICA/CCA RATIO: 1.36
BH CV XLRA MEAS RIGHT MID CCA PSV: NORMAL CM/SEC
BH CV XLRA MEAS RIGHT MID ICA PSV: NORMAL CM/SEC
BH CV XLRA MEAS RIGHT PROX ECA PSV: NORMAL CM/SEC

## 2020-10-14 PROCEDURE — 93799 UNLISTED CV SVC/PROCEDURE: CPT

## 2020-12-02 ENCOUNTER — OFFICE (AMBULATORY)
Dept: URBAN - METROPOLITAN AREA CLINIC 75 | Facility: CLINIC | Age: 75
End: 2020-12-02

## 2020-12-02 VITALS — OXYGEN SATURATION: 97 % | TEMPERATURE: 96.3 F | HEART RATE: 90 BPM | WEIGHT: 159 LBS | HEIGHT: 67 IN

## 2020-12-02 DIAGNOSIS — R19.7 DIARRHEA, UNSPECIFIED: ICD-10-CM

## 2020-12-02 PROCEDURE — 99204 OFFICE O/P NEW MOD 45 MIN: CPT | Performed by: INTERNAL MEDICINE

## 2020-12-08 ENCOUNTER — OFFICE (AMBULATORY)
Dept: URBAN - METROPOLITAN AREA LAB 2 | Facility: LAB | Age: 75
End: 2020-12-08
Payer: COMMERCIAL

## 2020-12-08 DIAGNOSIS — R19.7 DIARRHEA, UNSPECIFIED: ICD-10-CM

## 2020-12-08 PROCEDURE — 87507 IADNA-DNA/RNA PROBE TQ 12-25: CPT | Performed by: INTERNAL MEDICINE

## 2021-02-02 VITALS
SYSTOLIC BLOOD PRESSURE: 150 MMHG | HEART RATE: 74 BPM | SYSTOLIC BLOOD PRESSURE: 152 MMHG | RESPIRATION RATE: 15 BRPM | OXYGEN SATURATION: 97 % | DIASTOLIC BLOOD PRESSURE: 88 MMHG | SYSTOLIC BLOOD PRESSURE: 153 MMHG | TEMPERATURE: 96.9 F | HEART RATE: 81 BPM | WEIGHT: 160 LBS | RESPIRATION RATE: 18 BRPM | HEART RATE: 82 BPM | SYSTOLIC BLOOD PRESSURE: 140 MMHG | HEART RATE: 83 BPM | DIASTOLIC BLOOD PRESSURE: 80 MMHG | SYSTOLIC BLOOD PRESSURE: 93 MMHG | DIASTOLIC BLOOD PRESSURE: 55 MMHG | HEART RATE: 78 BPM | RESPIRATION RATE: 14 BRPM | DIASTOLIC BLOOD PRESSURE: 74 MMHG | SYSTOLIC BLOOD PRESSURE: 90 MMHG | TEMPERATURE: 98 F | SYSTOLIC BLOOD PRESSURE: 166 MMHG | DIASTOLIC BLOOD PRESSURE: 54 MMHG | OXYGEN SATURATION: 100 % | HEART RATE: 76 BPM | SYSTOLIC BLOOD PRESSURE: 96 MMHG | DIASTOLIC BLOOD PRESSURE: 87 MMHG | OXYGEN SATURATION: 99 % | OXYGEN SATURATION: 96 % | DIASTOLIC BLOOD PRESSURE: 61 MMHG | RESPIRATION RATE: 11 BRPM | SYSTOLIC BLOOD PRESSURE: 85 MMHG | DIASTOLIC BLOOD PRESSURE: 65 MMHG | HEART RATE: 75 BPM | HEIGHT: 68 IN | DIASTOLIC BLOOD PRESSURE: 86 MMHG | HEART RATE: 69 BPM | DIASTOLIC BLOOD PRESSURE: 57 MMHG | HEART RATE: 77 BPM | DIASTOLIC BLOOD PRESSURE: 51 MMHG | RESPIRATION RATE: 13 BRPM | SYSTOLIC BLOOD PRESSURE: 104 MMHG | DIASTOLIC BLOOD PRESSURE: 95 MMHG | RESPIRATION RATE: 16 BRPM | SYSTOLIC BLOOD PRESSURE: 149 MMHG | RESPIRATION RATE: 20 BRPM | SYSTOLIC BLOOD PRESSURE: 103 MMHG

## 2021-02-03 ENCOUNTER — AMBULATORY SURGICAL CENTER (AMBULATORY)
Dept: URBAN - METROPOLITAN AREA SURGERY 17 | Facility: SURGERY | Age: 76
End: 2021-02-03

## 2021-02-03 ENCOUNTER — OFFICE (AMBULATORY)
Dept: URBAN - METROPOLITAN AREA PATHOLOGY 4 | Facility: PATHOLOGY | Age: 76
End: 2021-02-03

## 2021-02-03 DIAGNOSIS — R19.7 DIARRHEA, UNSPECIFIED: ICD-10-CM

## 2021-02-03 DIAGNOSIS — D12.2 BENIGN NEOPLASM OF ASCENDING COLON: ICD-10-CM

## 2021-02-03 DIAGNOSIS — D12.4 BENIGN NEOPLASM OF DESCENDING COLON: ICD-10-CM

## 2021-02-03 DIAGNOSIS — K63.5 POLYP OF COLON: ICD-10-CM

## 2021-02-03 LAB
GI HISTOLOGY: A. UNSPECIFIED: (no result)
GI HISTOLOGY: B. SELECT: (no result)
GI HISTOLOGY: C. UNSPECIFIED: (no result)
GI HISTOLOGY: D. UNSPECIFIED: (no result)
GI HISTOLOGY: PDF REPORT: (no result)

## 2021-02-03 PROCEDURE — 45380 COLONOSCOPY AND BIOPSY: CPT | Performed by: INTERNAL MEDICINE

## 2021-02-03 PROCEDURE — 88305 TISSUE EXAM BY PATHOLOGIST: CPT | Mod: 26 | Performed by: INTERNAL MEDICINE

## 2021-06-22 ENCOUNTER — TELEPHONE (OUTPATIENT)
Dept: ORTHOPEDIC SURGERY | Facility: CLINIC | Age: 76
End: 2021-06-22

## 2021-06-22 ENCOUNTER — OFFICE VISIT (OUTPATIENT)
Dept: ORTHOPEDIC SURGERY | Facility: CLINIC | Age: 76
End: 2021-06-22

## 2021-06-22 VITALS — TEMPERATURE: 95 F | BODY MASS INDEX: 24.25 KG/M2 | WEIGHT: 160 LBS | HEIGHT: 68 IN

## 2021-06-22 DIAGNOSIS — M17.12 PRIMARY OSTEOARTHRITIS OF LEFT KNEE: ICD-10-CM

## 2021-06-22 DIAGNOSIS — R52 PAIN: Primary | ICD-10-CM

## 2021-06-22 PROCEDURE — 73562 X-RAY EXAM OF KNEE 3: CPT | Performed by: ORTHOPAEDIC SURGERY

## 2021-06-22 PROCEDURE — 99203 OFFICE O/P NEW LOW 30 MIN: CPT | Performed by: ORTHOPAEDIC SURGERY

## 2021-06-22 RX ORDER — FINASTERIDE 5 MG/1
TABLET, FILM COATED ORAL
COMMUNITY
Start: 2021-05-17

## 2021-06-22 NOTE — PROGRESS NOTES
willowPatient: Chuck Zheng  YOB: 1945 75 y.o. male  Medical Record Number: [id number]    Chief Complaints:   Chief Complaint   Patient presents with   • Left Knee - Establish Care, Pain       History of Present Illness:Chuck Zheng is a 75 y.o. male who presents with complaints of having left knee pain that started earlier in the year.  Patient denies any known injury to his knee.  Patient states he noticed a difference in March when it progressively started to worsen.  Patient reports the pain is located to the medial aspect of his knee.  Patient states the pain is not constant or daily and comes and go the depend on on his activity level.  Patient states he is an avid hiker and hikes several miles at a time.  Patient reports when he goes on a hike over 3 miles his knee begins to bother him.  Patient states the pain is a severe ache/throb pain.  Patient reports when he does have the pain it is a 7 out of 10.  He also reports that he has been previously treated by Dr. Gustavo Ochoa who did a meniscectomy on his left knee 2-1/2 years ago.  Patient states Dr. Ochoa has given him 3 steroid shots, and an  brace for activities.  Patient states he takes ibuprofen 800 mg as needed for pain.  Patient reports Dr. Ochoa keeps pressing him for a knee replacement.  Patient states that he wants to explore other options before jumping to an arthroplasty procedure.    Allergies:   Allergies   Allergen Reactions   • Ace Inhibitors Cough   • Beta Adrenergic Blockers      Can tolerate in low dose   • Sulfa Antibiotics Other (See Comments)     FELT BAD  Category: Allergy;    • Cephalexin Other (See Comments) and Rash     FELT TERRIBLE  FELT TERRIBLE       Medications:   Current Outpatient Medications   Medication Sig Dispense Refill   • amitriptyline (ELAVIL) 25 MG tablet TAKE 1 TABLET BY MOUTH EVERY NIGHT 30 tablet 11   • aspirin (ASPIR) 81 MG EC tablet Take 81 mg by mouth Every Night. STOPPED  "1/2/19     • B Complex Vitamins (VITAMIN B COMPLEX) capsule capsule Take  by mouth Daily.     • carvedilol (COREG) 3.125 MG tablet TAKE 1 TABLET BY MOUTH TWICE DAILY WITH MEALS 180 tablet 1   • Cetirizine HCl (ZYRTEC ALLERGY) 10 MG capsule Take 10 mg by mouth Every Morning.     • cholecalciferol (VITAMIN D3) 1000 units tablet Take 1,000 Units by mouth Daily. STOPPED 1/2/19     • Cyanocobalamin (VITAMIN B 12) 100 MCG lozenge Take 1 tablet by mouth Every Morning. 500 mg daily STOPPED 1/2/19     • finasteride (PROSCAR) 5 MG tablet      • Misc Natural Products (OSTEO BI-FLEX ADV JOINT SHIELD PO) Take  by mouth. STOPPED 1/2/19     • Multiple Vitamin (MULTI VITAMIN DAILY) tablet Take  by mouth. STOPPED 1/2/19     • Omega-3 Fatty Acids (FISH OIL DOUBLE STRENGTH) 1200 MG capsule Take  by mouth. STOPPED 1/2/19     • rosuvastatin (CRESTOR) 40 MG tablet Take 1 tablet by mouth Every Morning. 90 tablet 1   • tamsulosin (FLOMAX) 0.4 MG capsule 24 hr capsule      • zolpidem (AMBIEN) 10 MG tablet Take 1 tablet by mouth At Night As Needed for Sleep. 30 tablet 0   • fexofenadine (ALLEGRA) 180 MG tablet Take 180 mg by mouth Every Morning.       No current facility-administered medications for this visit.         The following portions of the patient's history were reviewed and updated as appropriate: allergies, current medications, past family history, past medical history, past social history, past surgical history and problem list.    Review of Systems:   A 14 point review of systems was performed. All systems negative except pertinent positives/negative listed in HPI above    Physical Exam:   Vitals:    06/22/21 1326   Temp: 95 °F (35 °C)   Weight: 72.6 kg (160 lb)   Height: 172.1 cm (67.75\")       General: A and O x 3, ASA, NAD    SCLERA:    Normal    DENTITION:   Normal    Knee:  left    ALIGNMENT:     Varus  ,   Patella  tracks  midline    GAIT:    Antalgic    SKIN:    No abnormality    RANGE OF MOTION:   3  -  125   " DEG    STRENGTH:   4  / 5    LIGAMENTS:    No varus / valgus instability.   Negative  Lachman.    MENISCUS:     Negative   Dominique       DISTAL PULSES:    Paplable    DISTAL SENSATION :   Intact    LYMPHATICS:     No   lymphadenopathy    OTHER:          - Positive   effusion      + Crepitance with ROM            Radiology:  Xrays 3views (ap,lateral, sunrise) were ordered and reviewed for evaluation of knee pain demonstrating advanced varus osteoarthritis with bone on bone articulation, subchondral cysts, and periarticular osteophytes.  No previous x-rays were available for comparison.    Assessment/Plan: Left knee osteoarthritis    Treatment options as well as imaging results were discussed in detail with the patient.  At this point in time we still wished to proceed forward with conservative measures.  We are going to give the patient a prescription for a lateral heel wedge and a prescription for physical therapy.  I think he would also benefit the patient to try a hyaluronic acid gel injection to avoid arthroplasty procedure.  We will get preauthorization from the patient's insurance company before proceeding forward.  We will have the patient follow-up with us in 1 week for the gel injection if approved.      Kory Lobo, APRN  6/22/2021     This patient was seen in conjunction today with Dr. Neal Goodwin.  Dr. Goodwin agrees with the above-stated assessment and plan.

## 2021-06-22 NOTE — TELEPHONE ENCOUNTER
Provider: CURTIS FOSTER  Caller: ASHELY OTERO  Relationship to Patient: SELF     Phone Number: [phone]  Reason for Call: SHOE INSERT  When was the patient last seen: 6-22-21    PATIENT WOULD LIKE A CALL BACK TO DISCUSS SHOE INSERT. PATIENT TOOK INSERT OUT TO PLACE IN NEW SHOW AND CANNOT REMEMBER HOW THE THICK OR THIN SIDE GOES IN.

## 2021-06-29 ENCOUNTER — CLINICAL SUPPORT (OUTPATIENT)
Dept: ORTHOPEDIC SURGERY | Facility: CLINIC | Age: 76
End: 2021-06-29

## 2021-06-29 VITALS — BODY MASS INDEX: 25.11 KG/M2 | WEIGHT: 160 LBS | HEIGHT: 67 IN | TEMPERATURE: 98.4 F

## 2021-06-29 DIAGNOSIS — M17.12 PRIMARY OSTEOARTHRITIS OF LEFT KNEE: ICD-10-CM

## 2021-06-29 PROCEDURE — 20610 DRAIN/INJ JOINT/BURSA W/O US: CPT | Performed by: NURSE PRACTITIONER

## 2021-06-29 RX ORDER — LIDOCAINE HYDROCHLORIDE 20 MG/ML
2 INJECTION, SOLUTION EPIDURAL; INFILTRATION; INTRACAUDAL; PERINEURAL
Status: COMPLETED | OUTPATIENT
Start: 2021-06-29 | End: 2021-06-29

## 2021-06-29 RX ADMIN — LIDOCAINE HYDROCHLORIDE 2 ML: 20 INJECTION, SOLUTION EPIDURAL; INFILTRATION; INTRACAUDAL; PERINEURAL at 14:14

## 2021-06-29 NOTE — PROGRESS NOTES
6/29/2021    Chuck Zheng is here today for worsening knee pain. Pt has undergone injection of the knee in the past with good resolution of symptoms. Pt is requesting a repeat injection.     KNEE Injection Procedure Note:    Large Joint Arthrocentesis: L knee  Date/Time: 6/29/2021 2:14 PM  Consent given by: patient  Site marked: site marked  Timeout: Immediately prior to procedure a time out was called to verify the correct patient, procedure, equipment, support staff and site/side marked as required   Supporting Documentation  Indications: pain and joint swelling   Procedure Details  Location: knee - L knee  Preparation: Patient was prepped and draped in the usual sterile fashion  Needle size: 22 G  Approach: anterolateral  Medications administered: 60 mg Sodium Hyaluronate 60 MG/3ML; 2 mL lidocaine PF 2% 2 %  Patient tolerance: patient tolerated the procedure well with no immediate complications          At the conclusion of the injection I discussed the importance of continued quad strengthening exercises on a daily basis. I will see the patient back if the symptoms should fail to improve or worsen.    Mildred Urbina, APRN  6/29/2021

## 2021-09-03 ENCOUNTER — OFFICE VISIT (OUTPATIENT)
Dept: ORTHOPEDIC SURGERY | Facility: CLINIC | Age: 76
End: 2021-09-03

## 2021-09-03 VITALS — BODY MASS INDEX: 24.55 KG/M2 | TEMPERATURE: 97.3 F | HEIGHT: 68 IN | WEIGHT: 162 LBS

## 2021-09-03 DIAGNOSIS — M17.12 PRIMARY OSTEOARTHRITIS OF LEFT KNEE: Primary | ICD-10-CM

## 2021-09-03 PROCEDURE — 99214 OFFICE O/P EST MOD 30 MIN: CPT | Performed by: NURSE PRACTITIONER

## 2021-09-03 RX ORDER — VANCOMYCIN HYDROCHLORIDE 1 G/200ML
15 INJECTION, SOLUTION INTRAVENOUS ONCE
Status: CANCELLED | OUTPATIENT
Start: 2021-12-17 | End: 2021-09-03

## 2021-09-03 RX ORDER — MELOXICAM 15 MG/1
15 TABLET ORAL DAILY
Qty: 30 TABLET | Refills: 3 | Status: SHIPPED | OUTPATIENT
Start: 2021-09-03 | End: 2021-12-18 | Stop reason: HOSPADM

## 2021-09-03 RX ORDER — MELOXICAM 15 MG/1
15 TABLET ORAL ONCE
Status: CANCELLED | OUTPATIENT
Start: 2021-12-17 | End: 2021-09-03

## 2021-09-03 RX ORDER — PREGABALIN 75 MG/1
150 CAPSULE ORAL ONCE
Status: CANCELLED | OUTPATIENT
Start: 2021-12-17 | End: 2021-09-03

## 2021-09-03 RX ORDER — CHLORHEXIDINE GLUCONATE 500 MG/1
CLOTH TOPICAL 2 TIMES DAILY
Status: CANCELLED | OUTPATIENT
Start: 2021-09-03

## 2021-09-03 NOTE — PROGRESS NOTES
Patient: Chuck Zheng  YOB: 1945 75 y.o. male  Medical Record Number: [id number]    Chief Complaints:   Chief Complaint   Patient presents with   • Left Knee - Follow-up, Pain       History of Present Illness:Chuck Zheng is a 75 y.o. male who presents with complaints of increasing left knee pain.  He has known bone-on-bone end-stage osteoarthritis.  He did not get much improvement from last injection.  He would like to proceed with total knee replacement    Allergies:   Allergies   Allergen Reactions   • Ace Inhibitors Cough   • Beta Adrenergic Blockers      Can tolerate in low dose   • Sulfa Antibiotics Other (See Comments)     FELT BAD  Category: Allergy;    • Cephalexin Other (See Comments) and Rash     FELT TERRIBLE  FELT TERRIBLE       Medications:   Current Outpatient Medications   Medication Sig Dispense Refill   • amitriptyline (ELAVIL) 25 MG tablet TAKE 1 TABLET BY MOUTH EVERY NIGHT 30 tablet 11   • aspirin (ASPIR) 81 MG EC tablet Take 81 mg by mouth Every Night. STOPPED 1/2/19     • B Complex Vitamins (VITAMIN B COMPLEX) capsule capsule Take  by mouth Daily.     • carvedilol (COREG) 3.125 MG tablet TAKE 1 TABLET BY MOUTH TWICE DAILY WITH MEALS 180 tablet 1   • Cetirizine HCl (ZYRTEC ALLERGY) 10 MG capsule Take 10 mg by mouth Every Morning.     • cholecalciferol (VITAMIN D3) 1000 units tablet Take 1,000 Units by mouth Daily. STOPPED 1/2/19     • Cyanocobalamin (VITAMIN B 12) 100 MCG lozenge Take 1 tablet by mouth Every Morning. 500 mg daily STOPPED 1/2/19     • fexofenadine (ALLEGRA) 180 MG tablet Take 180 mg by mouth Every Morning.     • finasteride (PROSCAR) 5 MG tablet      • Misc Natural Products (OSTEO BI-FLEX ADV JOINT SHIELD PO) Take  by mouth. STOPPED 1/2/19     • Multiple Vitamin (MULTI VITAMIN DAILY) tablet Take  by mouth. STOPPED 1/2/19     • Omega-3 Fatty Acids (FISH OIL DOUBLE STRENGTH) 1200 MG capsule Take  by mouth. STOPPED 1/2/19     • rosuvastatin (CRESTOR) 40  "MG tablet Take 1 tablet by mouth Every Morning. 90 tablet 1   • tamsulosin (FLOMAX) 0.4 MG capsule 24 hr capsule      • zolpidem (AMBIEN) 10 MG tablet Take 1 tablet by mouth At Night As Needed for Sleep. 30 tablet 0   • meloxicam (Mobic) 15 MG tablet Take 1 tablet by mouth Daily. 30 tablet 3     No current facility-administered medications for this visit.         The following portions of the patient's history were reviewed and updated as appropriate: allergies, current medications, past family history, past medical history, past social history, past surgical history and problem list.    Review of Systems:   A 14 point review of systems was performed. All systems negative except pertinent positives/negative listed in HPI above    Physical Exam:   Vitals:    09/03/21 1544   Temp: 97.3 °F (36.3 °C)   Weight: 73.5 kg (162 lb)  Comment: per patient weighed this AM   Height: 172.7 cm (68\")       General: A and O x 3, ASA, NAD    SCLERA:    Normal    Skin clear no unusual lesions noted  Left knee patient has trace amount of effusion noted with 110 degrees flexion neutral extension with a positive Dominique negative Lockman calf soft and nontender    Radiology:  Xrays 3views (ap,lateral, sunrise) left knee were reviewed that were done previously show bone-on-bone end-stage osteoarthritis    Assessment/Plan: End-stage osteoarthritis left knee    Patient I discussed options, he would like to proceed with left total knee replacement in December.  He will be an overnight stay.  Risk benefits alternatives were all discussed with patient.  Prescription sent to pharmacy for meloxicam since it does help temporarily.  I will asked the Dr. Goodwin give him a call to discuss surgery      Mildred Urbina, APRN  9/3/2021  "

## 2021-09-08 PROBLEM — M17.12 PRIMARY OSTEOARTHRITIS OF LEFT KNEE: Status: ACTIVE | Noted: 2021-09-08

## 2021-09-09 ENCOUNTER — TELEPHONE (OUTPATIENT)
Dept: ORTHOPEDIC SURGERY | Facility: CLINIC | Age: 76
End: 2021-09-09

## 2021-09-09 NOTE — TELEPHONE ENCOUNTER
Can you please give patient a call back. Per MLL last office note on 9/3 she was going to have you call patient to discuss sx.  Patient is available today until 3 at work #[phone]

## 2021-09-16 ENCOUNTER — TELEPHONE (OUTPATIENT)
Dept: ORTHOPEDIC SURGERY | Facility: CLINIC | Age: 76
End: 2021-09-16

## 2021-09-16 NOTE — TELEPHONE ENCOUNTER
Unfortunately between taking an anti-inflammatory and bracing I do not believe there is any other thing we could do to help him with his trip.  Only he can make the decision whether or not his knee is strong enough to indoor the trip.

## 2021-09-16 NOTE — TELEPHONE ENCOUNTER
Patient calling to speak with [facility] regarding SX 12/14/2021.     Wanted to know if he should continue PT at home, I told him yes bc the stronger his leg is prior to SX the better his recovery will be after SX.     Also wanted to know if there was anything else would help him during his hiking trip in [place] in Oct. He currently has Meloxicam, a knee brace but is wanting to know if theres anything else he should be doing or using to help him along during this trip. OR should he discontinue his trip all together.         Work number [phone] is the best way to reach him and Friday 9am-3pm would be best bc once he leaves work he doesn't answer his phone.

## 2021-12-06 ENCOUNTER — TELEPHONE (OUTPATIENT)
Dept: ORTHOPEDIC SURGERY | Facility: CLINIC | Age: 76
End: 2021-12-06

## 2021-12-06 ENCOUNTER — PRE-ADMISSION TESTING (OUTPATIENT)
Dept: PREADMISSION TESTING | Facility: HOSPITAL | Age: 76
End: 2021-12-06

## 2021-12-06 VITALS
TEMPERATURE: 98.7 F | HEIGHT: 68 IN | HEART RATE: 70 BPM | BODY MASS INDEX: 25.76 KG/M2 | RESPIRATION RATE: 16 BRPM | DIASTOLIC BLOOD PRESSURE: 75 MMHG | SYSTOLIC BLOOD PRESSURE: 148 MMHG | OXYGEN SATURATION: 98 % | WEIGHT: 170 LBS

## 2021-12-06 DIAGNOSIS — M17.12 PRIMARY OSTEOARTHRITIS OF LEFT KNEE: ICD-10-CM

## 2021-12-06 LAB
ANION GAP SERPL CALCULATED.3IONS-SCNC: 6.6 MMOL/L (ref 5–15)
BILIRUB UR QL STRIP: NEGATIVE
BUN SERPL-MCNC: 24 MG/DL (ref 8–23)
BUN/CREAT SERPL: 28.2 (ref 7–25)
CALCIUM SPEC-SCNC: 9.2 MG/DL (ref 8.6–10.5)
CHLORIDE SERPL-SCNC: 108 MMOL/L (ref 98–107)
CLARITY UR: CLEAR
CO2 SERPL-SCNC: 27.4 MMOL/L (ref 22–29)
COLOR UR: ABNORMAL
CREAT SERPL-MCNC: 0.85 MG/DL (ref 0.76–1.27)
DEPRECATED RDW RBC AUTO: 43.4 FL (ref 37–54)
ERYTHROCYTE [DISTWIDTH] IN BLOOD BY AUTOMATED COUNT: 13.4 % (ref 12.3–15.4)
GFR SERPL CREATININE-BSD FRML MDRD: 88 ML/MIN/1.73
GLUCOSE SERPL-MCNC: 106 MG/DL (ref 65–99)
GLUCOSE UR STRIP-MCNC: NEGATIVE MG/DL
HCT VFR BLD AUTO: 40 % (ref 37.5–51)
HGB BLD-MCNC: 12.9 G/DL (ref 13–17.7)
HGB UR QL STRIP.AUTO: NEGATIVE
KETONES UR QL STRIP: NEGATIVE
LEUKOCYTE ESTERASE UR QL STRIP.AUTO: NEGATIVE
MCH RBC QN AUTO: 28.3 PG (ref 26.6–33)
MCHC RBC AUTO-ENTMCNC: 32.3 G/DL (ref 31.5–35.7)
MCV RBC AUTO: 87.7 FL (ref 79–97)
NITRITE UR QL STRIP: NEGATIVE
PH UR STRIP.AUTO: <=5 [PH] (ref 5–8)
PLATELET # BLD AUTO: 179 10*3/MM3 (ref 140–450)
PMV BLD AUTO: 10.4 FL (ref 6–12)
POTASSIUM SERPL-SCNC: 4.1 MMOL/L (ref 3.5–5.2)
PROT UR QL STRIP: NEGATIVE
QT INTERVAL: 391 MS
RBC # BLD AUTO: 4.56 10*6/MM3 (ref 4.14–5.8)
SODIUM SERPL-SCNC: 142 MMOL/L (ref 136–145)
SP GR UR STRIP: 1.03 (ref 1–1.03)
UROBILINOGEN UR QL STRIP: ABNORMAL
WBC NRBC COR # BLD: 4.82 10*3/MM3 (ref 3.4–10.8)

## 2021-12-06 PROCEDURE — 93005 ELECTROCARDIOGRAM TRACING: CPT

## 2021-12-06 PROCEDURE — 85027 COMPLETE CBC AUTOMATED: CPT

## 2021-12-06 PROCEDURE — 36415 COLL VENOUS BLD VENIPUNCTURE: CPT

## 2021-12-06 PROCEDURE — 93010 ELECTROCARDIOGRAM REPORT: CPT | Performed by: INTERNAL MEDICINE

## 2021-12-06 PROCEDURE — 80048 BASIC METABOLIC PNL TOTAL CA: CPT

## 2021-12-06 PROCEDURE — 81003 URINALYSIS AUTO W/O SCOPE: CPT

## 2021-12-06 RX ORDER — UBIDECARENONE 50 MG
1 CAPSULE ORAL DAILY
COMMUNITY
End: 2021-12-18 | Stop reason: HOSPADM

## 2021-12-06 ASSESSMENT — KOOS JR
KOOS JR SCORE: 3
KOOS JR SCORE: 79.914

## 2021-12-06 NOTE — TELEPHONE ENCOUNTER
Please let patient know that we would not be able to proceed with surgery if he has any open sores on his skin.  If they need to treat any of his lesions and cannot hold off for 3 months after surgery would recommend we reschedule surgery

## 2021-12-06 NOTE — DISCHARGE INSTRUCTIONS
Take the following medications the morning of surgery: CARVEDILOL     ARRIVAL TIME: HOSPITAL WILL CALL PRIOR TO DAY OF SURGERY      General Instructions:  • Do not eat solid food after midnight the night before surgery.  • You may drink clear liquids day of surgery but must stop at least one hour before your hospital arrival time.  • It is beneficial for you to have a clear drink that contains carbohydrates the day of surgery.  We suggest a 12 to 20 ounce bottle of Gatorade or Powerade for non-diabetic patients or a 12 to 20 ounce bottle of G2 or Powerade Zero for diabetic patients. (Pediatric patients, are not advised to drink a 12 to 20 ounce carbohydrate drink)    Clear liquids are liquids you can see through.  Nothing red in color.     Plain water                               Sports drinks  Sodas                                   Gelatin (Jell-O)  Fruit juices without pulp such as white grape juice and apple juice  Popsicles that contain no fruit or yogurt  Tea or coffee (no cream or milk added)  Gatorade / Powerade    • Patients who avoid smoking, chewing tobacco and alcohol for 4 weeks prior to surgery have a reduced risk of post-operative complications.  Quit smoking as many days before surgery as you can.  • Do not smoke, use chewing tobacco or drink alcohol the day of surgery.   • If applicable bring your C-PAP/ BI-PAP machine.  • Bring any papers given to you in the doctor’s office.  • Wear clean comfortable clothes.  • Do not wear contact lenses, false eyelashes or make-up.  Bring a case for your glasses.   • Bring crutches or walker if applicable.  • Remove all piercings.  Leave jewelry and any other valuables at home.  • Hair extensions with metal clips must be removed prior to surgery.  • The Pre-Admission Testing nurse will instruct you to bring medications if unable to obtain an accurate list in Pre-Admission Testing.        Preventing a Surgical Site Infection:  • For 2 to 3 days before surgery,  avoid shaving with a razor because the razor can irritate skin and make it easier to develop an infection.    • Any areas of open skin can increase the risk of a post-operative wound infection by allowing bacteria to enter and travel throughout the body.  Notify your surgeon if you have any skin wounds / rashes even if it is not near the expected surgical site.  The area will need assessed to determine if surgery should be delayed until it is healed.  • The night prior to surgery shower using a fresh bar of anti-bacterial soap (such as Dial) and clean washcloth.  Sleep in a clean bed with clean clothing.  Do not allow pets to sleep with you.  • Shower on the morning of surgery using a fresh bar of anti-bacterial soap (such as Dial) and clean washcloth.  Dry with a clean towel and dress in clean clothing.  • Ask your surgeon if you will be receiving antibiotics prior to surgery.  • Make sure you, your family, and all healthcare providers clean their hands with soap and water or an alcohol based hand  before caring for you or your wound.    Day of surgery:  Your arrival time is approximately two hours before your scheduled surgery time.  Upon arrival, a Pre-op nurse and Anesthesiologist will review your health history, obtain vital signs, and answer questions you may have.  The only belongings needed at this time will be a list of your home medications and if applicable your C-PAP/BI-PAP machine.  A Pre-op nurse will start an IV and you may receive medication in preparation for surgery, including something to help you relax.     Please be aware that surgery does come with discomfort.  We want to make every effort to control your discomfort so please discuss any uncontrolled symptoms with your nurse.   Your doctor will most likely have prescribed pain medications.      If you are going home after surgery you will receive individualized written care instructions before being discharged.  A responsible adult  must drive you to and from the hospital on the day of your surgery and stay with you for 24 hours.  Discharge prescriptions can be filled by the hospital pharmacy during regular pharmacy hours.  If you are having surgery late in the day/evening your prescription may be e-prescribed to your pharmacy.  Please verify your pharmacy hours or chose a 24 hour pharmacy to avoid not having access to your prescription because your pharmacy has closed for the day.    If you are staying overnight following surgery, you will be transported to your hospital room following the recovery period.  [facility] has all private rooms.    If you have any questions please call Pre-Admission Testing at [phone].  Deductibles and co-payments are collected on the day of service. Please be prepared to pay the required co-pay, deductible or deposit on the day of service as defined by your plan.    Patient Education for Self-Quarantine Process    • Following your COVID testing, we strongly recommend that you wear a mask when you are with other people and practice social distancing.   • Limit your activities to only required outings.  • Wash your hands with soap and water frequently for at least 20 seconds.   • Avoid touching your eyes, nose and mouth with unwashed hands.  • Do not share anything - utensils, drinking glasses, food from the same bowl.   • Sanitize household surfaces daily. Include all high touch areas (door handles, light switches, phones, countertops, etc.)    Call your surgeon immediately if you experience any of the following symptoms:  • Sore Throat  • Shortness of Breath or difficulty breathing  • Cough  • Chills  • Body soreness or muscle pain  • Headache  • Fever  • New loss of taste or smell  • Do not arrive for your surgery ill.  Your procedure will need to be rescheduled to another time.  You will need to call your physician before the day of surgery to avoid any unnecessary exposure to hospital staff  as well as other patients.      CHLORHEXIDINE CLOTH INSTRUCTIONS  The morning of surgery follow these instructions using the Chlorhexidine cloths you've been given.  These steps reduce bacteria on the body.  Do not use the cloths near your eyes, ears mouth, genitalia or on open wounds.  Throw the cloths away after use but do not try to flush them down a toilet.      • Open and remove one cloth at a time from the package.    • Leave the cloth unfolded and begin the bathing.  • Massage the skin with the cloths using gentle pressure to remove bacteria.  Do not scrub harshly.   • Follow the steps below with one 2% CHG cloth per area (6 total cloths).  • One cloth for neck, shoulders and chest.  • One cloth for both arms, hands, fingers and underarms (do underarms last).  • One cloth for the abdomen followed by groin.  • One cloth for right leg and foot including between the toes.  • One cloth for left leg and foot including between the toes.  • The last cloth is to be used for the back of the neck, back and buttocks.    Allow the CHG to air dry 3 minutes on the skin which will give it time to work and decrease the chance of irritation.  The skin may feel sticky until it is dry.  Do not rinse with water or any other liquid or you will lose the beneficial effects of the CHG.  If mild skin irritation occurs, do rinse the skin to remove the CHG.  Report this to the nurse at time of admission.  Do not apply lotions, creams, ointments, deodorants or perfumes after using the clothes. Dress in clean clothes before coming to the hospital.    BACTROBAN NASAL OINTMENT  There are many germs normally in your nose. Bactroban is an ointment that will help reduce these germs. Please follow these instructions for Bactroban use:      __1__The day before surgery in the morning  Date_12/16    __2__The day before surgery in the evening              Date_12/16    __3__The day of surgery in the morning    Date_12/17    **Squirt ½ package of  Bactroban Ointment onto a cotton applicator and apply to inside of 1st nostril.  Squirt the remaining Bactroban and apply to the inside of the other nostril.

## 2021-12-06 NOTE — TELEPHONE ENCOUNTER
Caller: ASHELY  Relationship to Patient: SELF    Phone Number: [phone]  Reason for Call: PT CALLED STATING THAT HE HAS AN UPCOMING SX 12/17/2021 WITH [facility] HE STATES THAT HE HAS A DERMATOLOGY APPT 12/09/2021 AND THEY NORMALLY FREEZE SOME SPOTS ON HIS SKIN. PT WOULD LIKE TO KNOW IF HE SHOULD KEEP DERM. APPT OR IF HE SHOULD HOLD OFF UNTIL AFTER SX. PLEASE ADVISE PT AT ABOVE PHONE NUMBER. PT STATES THAT IT IS OK TO LEAVE A VOICEMAIL IF HE DOES NOT ANSWER.

## 2021-12-09 ENCOUNTER — OFFICE VISIT (OUTPATIENT)
Dept: ORTHOPEDIC SURGERY | Facility: CLINIC | Age: 76
End: 2021-12-09

## 2021-12-09 VITALS — TEMPERATURE: 97.8 F | WEIGHT: 170 LBS | BODY MASS INDEX: 25.76 KG/M2 | HEIGHT: 68 IN

## 2021-12-09 DIAGNOSIS — M17.12 PRIMARY OSTEOARTHRITIS OF LEFT KNEE: Primary | ICD-10-CM

## 2021-12-09 PROCEDURE — S0260 H&P FOR SURGERY: HCPCS | Performed by: NURSE PRACTITIONER

## 2021-12-09 NOTE — H&P
Patient: Chuck Zheng    Date of Admission: 12/17/2021    YOB: 1945    Medical Record Number: [id number]    Admitting Physician: Dr. Neal Goodwin    Reason for Admission: End Stage Left Knee OA    History of Present Illness: 75 y.o. male presents with severe end stage knee osteoarthritis which has not been responsive to the full compliment of conservative measures. Despite conservative attempts, there is still severe, constant activity limiting pain. Given the severity of the pain, the patient has elected to proceed with knee replacement.    Allergies:   Allergies   Allergen Reactions   • Ace Inhibitors Cough   • Beta Adrenergic Blockers      Can tolerate in low dose   • Sulfa Antibiotics Other (See Comments)     FELT BAD  Category: Allergy;    • Cephalexin Other (See Comments) and Rash     FELT TERRIBLE  FELT TERRIBLE         Current Medications:  Home Medications:    Current Outpatient Medications on File Prior to Visit   Medication Sig   • amitriptyline (ELAVIL) 25 MG tablet TAKE 1 TABLET BY MOUTH EVERY NIGHT   • aspirin (ASPIR) 81 MG EC tablet Take 81 mg by mouth Every Night. HOLD X 5-7 DAYS   • B Complex Vitamins (VITAMIN B COMPLEX) capsule capsule Take 1 capsule by mouth Daily. HOLD X 1 WEEK   • carvedilol (COREG) 3.125 MG tablet TAKE 1 TABLET BY MOUTH TWICE DAILY WITH MEALS (Patient taking differently: Take 3.125 mg by mouth 2 (Two) Times a Day With Meals.)   • Cetirizine HCl (ZYRTEC ALLERGY) 10 MG capsule Take 10 mg by mouth Every Morning.   • cholecalciferol (VITAMIN D3) 1000 units tablet Take 1,000 Units by mouth Daily. HOLD X 1 WEEK   • Cyanocobalamin (VITAMIN B 12) 100 MCG lozenge Take 1 tablet by mouth Every Morning. 3000MCG HOLD X 1 WEEK   • finasteride (PROSCAR) 5 MG tablet    • meloxicam (Mobic) 15 MG tablet Take 1 tablet by mouth Daily. (Patient taking differently: Take 15 mg by mouth Daily. HOLD X 1 WEEK)   • Multiple Vitamin (MULTI VITAMIN DAILY) tablet Take  by mouth. HOLD X 1  WEEK   • NON FORMULARY Take 1 tablet by mouth Daily. CBD GUMMI 500MG  HOLD X 1 WEEK   • Omega-3 Fatty Acids (FISH OIL DOUBLE STRENGTH) 1200 MG capsule Take  by mouth. STOPPED 1/2/19  HOLD X 1 WEEK   • Red Yeast Rice 600 MG tablet Take 1 tablet/day by mouth Daily. HOLD X 1 WEEK   • rosuvastatin (CRESTOR) 40 MG tablet Take 1 tablet by mouth Every Morning. (Patient taking differently: Take 20 mg by mouth Every Morning.)   • tamsulosin (FLOMAX) 0.4 MG capsule 24 hr capsule    • zolpidem (AMBIEN) 10 MG tablet Take 1 tablet by mouth At Night As Needed for Sleep.     No current facility-administered medications on file prior to visit.     PRN Meds:.    PMH:     Past Medical History:   Diagnosis Date   • Adhesive capsulitis of shoulder    • Arthritis    • Benign non-nodular prostatic hyperplasia 11/21/2016   • Benign prostatic hypertrophy    • Coronary artery disease    • Functional diarrhea 9/13/2017    Resolved with gluten free diet   • H/O cardiovascular stress test     7/14 normal   • Hemorrhoid    • History of COVID-19 11/2020   • Hyperlipidemia    • Hypertension    • Myocardial infarction (HCC)      05/2007, distal RCA 80% stenosis, s/p PTCA   • Nephrolithiasis     left kidney   • Obstructive sleep apnea     BIPAP   • Primary insomnia 5/16/2016   • Rupture of flexor tendon of hand    • Syncope     12/2004 neg w/u       PF/Surg/Soc Hx:     Past Surgical History:   Procedure Laterality Date   • CHOLECYSTECTOMY     • COLONOSCOPY      06/2003, nl, , 02/2014 , nl, needs C-scope in 2024   • CORONARY STENT PLACEMENT      5/2007 distal RCA   • EYE SURGERY      JOEY CATARACTS W IOL   • HAND SURGERY Right     THUMB SX   • KNEE ARTHROSCOPY Right     2015   • KNEE ARTHROSCOPY Left 1/15/2019    Procedure: LEFT KNEE ARTHROSCOPY,  PARTIAL MEDIAL MENISECTOMY;  Surgeon: Mason Ochoa MD;  Location: [facility] OR [facility] [place];  Service: Orthopedics   • TONSILLECTOMY          Social History     Occupational History   • Not on  "file   Tobacco Use   • Smoking status: Never Smoker   • Smokeless tobacco: Never Used   Substance and Sexual Activity   • Alcohol use: No     Comment: 7 drinks weekly   • Drug use: No   • Sexual activity: Not on file      Social History     Social History Narrative   • Not on file        Family History   Problem Relation Age of Onset   • Dementia Mother    • Glaucoma Mother    • Heart disease Father    • Colon polyps Father    • Malig Hyperthermia Neg Hx          Review of Systems:   A 14 point review of systems was performed, pertinent positives discussed above, all other systems are negative    Physical Exam: 75 y.o. male  Vital Signs :   Vitals:    12/09/21 1337   Temp: 97.8 °F (36.6 °C)   Weight: 77.1 kg (170 lb)   Height: 172.7 cm (68\")     General: Alert and Oriented x 3, No acute distress.  Psych: mood and affect appropriate; recent and remote memory intact  Eyes: conjunctiva clear; pupils equally round and reactive, sclera nonicteric  CV: no peripheral edema  Resp: normal respiratory effort  Skin: no rashes or wounds; normal turgor  Musculosketetal; pain and crepitance with knee range of motion  Vascular: palpable distal pulses    Xrays:  -3 views (AP, lateral, and sunrise) were reviewed demonstrating end-stage OA with bone on bone articulation.  -A full length AP xray was ordered and reviewed today for purposes of operative alignment demonstrating end stage arthritic findings. There are no previous full length films for review    Assessment:  End-stage Left knee osteoarthritis. Conservative measures have failed.      Plan:  The plan is to proceed with Left Total Knee Replacement. The patient voiced understanding of the risks, benefits, and alternative forms of treatment that were discussed with Dr Goodwin at the time of scheduling. 23     Mildred Urbina, APRN  12/9/2021        "

## 2021-12-09 NOTE — H&P (VIEW-ONLY)
Patient: Chuck Zheng    Date of Admission: 12/17/2021    YOB: 1945    Medical Record Number: [id number]    Admitting Physician: Dr. Neal Goodwin    Reason for Admission: End Stage Left Knee OA    History of Present Illness: 75 y.o. male presents with severe end stage knee osteoarthritis which has not been responsive to the full compliment of conservative measures. Despite conservative attempts, there is still severe, constant activity limiting pain. Given the severity of the pain, the patient has elected to proceed with knee replacement.    Allergies:   Allergies   Allergen Reactions   • Ace Inhibitors Cough   • Beta Adrenergic Blockers      Can tolerate in low dose   • Sulfa Antibiotics Other (See Comments)     FELT BAD  Category: Allergy;    • Cephalexin Other (See Comments) and Rash     FELT TERRIBLE  FELT TERRIBLE         Current Medications:  Home Medications:    Current Outpatient Medications on File Prior to Visit   Medication Sig   • amitriptyline (ELAVIL) 25 MG tablet TAKE 1 TABLET BY MOUTH EVERY NIGHT   • aspirin (ASPIR) 81 MG EC tablet Take 81 mg by mouth Every Night. HOLD X 5-7 DAYS   • B Complex Vitamins (VITAMIN B COMPLEX) capsule capsule Take 1 capsule by mouth Daily. HOLD X 1 WEEK   • carvedilol (COREG) 3.125 MG tablet TAKE 1 TABLET BY MOUTH TWICE DAILY WITH MEALS (Patient taking differently: Take 3.125 mg by mouth 2 (Two) Times a Day With Meals.)   • Cetirizine HCl (ZYRTEC ALLERGY) 10 MG capsule Take 10 mg by mouth Every Morning.   • cholecalciferol (VITAMIN D3) 1000 units tablet Take 1,000 Units by mouth Daily. HOLD X 1 WEEK   • Cyanocobalamin (VITAMIN B 12) 100 MCG lozenge Take 1 tablet by mouth Every Morning. 3000MCG HOLD X 1 WEEK   • finasteride (PROSCAR) 5 MG tablet    • meloxicam (Mobic) 15 MG tablet Take 1 tablet by mouth Daily. (Patient taking differently: Take 15 mg by mouth Daily. HOLD X 1 WEEK)   • Multiple Vitamin (MULTI VITAMIN DAILY) tablet Take  by mouth. HOLD X 1  WEEK   • NON FORMULARY Take 1 tablet by mouth Daily. CBD GUMMI 500MG  HOLD X 1 WEEK   • Omega-3 Fatty Acids (FISH OIL DOUBLE STRENGTH) 1200 MG capsule Take  by mouth. STOPPED 1/2/19  HOLD X 1 WEEK   • Red Yeast Rice 600 MG tablet Take 1 tablet/day by mouth Daily. HOLD X 1 WEEK   • rosuvastatin (CRESTOR) 40 MG tablet Take 1 tablet by mouth Every Morning. (Patient taking differently: Take 20 mg by mouth Every Morning.)   • tamsulosin (FLOMAX) 0.4 MG capsule 24 hr capsule    • zolpidem (AMBIEN) 10 MG tablet Take 1 tablet by mouth At Night As Needed for Sleep.     No current facility-administered medications on file prior to visit.     PRN Meds:.    PMH:     Past Medical History:   Diagnosis Date   • Adhesive capsulitis of shoulder    • Arthritis    • Benign non-nodular prostatic hyperplasia 11/21/2016   • Benign prostatic hypertrophy    • Coronary artery disease    • Functional diarrhea 9/13/2017    Resolved with gluten free diet   • H/O cardiovascular stress test     7/14 normal   • Hemorrhoid    • History of COVID-19 11/2020   • Hyperlipidemia    • Hypertension    • Myocardial infarction (HCC)      05/2007, distal RCA 80% stenosis, s/p PTCA   • Nephrolithiasis     left kidney   • Obstructive sleep apnea     BIPAP   • Primary insomnia 5/16/2016   • Rupture of flexor tendon of hand    • Syncope     12/2004 neg w/u       PF/Surg/Soc Hx:     Past Surgical History:   Procedure Laterality Date   • CHOLECYSTECTOMY     • COLONOSCOPY      06/2003, nl, , 02/2014 , nl, needs C-scope in 2024   • CORONARY STENT PLACEMENT      5/2007 distal RCA   • EYE SURGERY      JOEY CATARACTS W IOL   • HAND SURGERY Right     THUMB SX   • KNEE ARTHROSCOPY Right     2015   • KNEE ARTHROSCOPY Left 1/15/2019    Procedure: LEFT KNEE ARTHROSCOPY,  PARTIAL MEDIAL MENISECTOMY;  Surgeon: Mason Ochoa MD;  Location: [facility] OR [facility] [place];  Service: Orthopedics   • TONSILLECTOMY          Social History     Occupational History   • Not on  "file   Tobacco Use   • Smoking status: Never Smoker   • Smokeless tobacco: Never Used   Substance and Sexual Activity   • Alcohol use: No     Comment: 7 drinks weekly   • Drug use: No   • Sexual activity: Not on file      Social History     Social History Narrative   • Not on file        Family History   Problem Relation Age of Onset   • Dementia Mother    • Glaucoma Mother    • Heart disease Father    • Colon polyps Father    • Malig Hyperthermia Neg Hx          Review of Systems:   A 14 point review of systems was performed, pertinent positives discussed above, all other systems are negative    Physical Exam: 75 y.o. male  Vital Signs :   Vitals:    12/09/21 1337   Temp: 97.8 °F (36.6 °C)   Weight: 77.1 kg (170 lb)   Height: 172.7 cm (68\")     General: Alert and Oriented x 3, No acute distress.  Psych: mood and affect appropriate; recent and remote memory intact  Eyes: conjunctiva clear; pupils equally round and reactive, sclera nonicteric  CV: no peripheral edema  Resp: normal respiratory effort  Skin: no rashes or wounds; normal turgor  Musculosketetal; pain and crepitance with knee range of motion  Vascular: palpable distal pulses    Xrays:  -3 views (AP, lateral, and sunrise) were reviewed demonstrating end-stage OA with bone on bone articulation.  -A full length AP xray was ordered and reviewed today for purposes of operative alignment demonstrating end stage arthritic findings. There are no previous full length films for review    Assessment:  End-stage Left knee osteoarthritis. Conservative measures have failed.      Plan:  The plan is to proceed with Left Total Knee Replacement. The patient voiced understanding of the risks, benefits, and alternative forms of treatment that were discussed with Dr Goodwin at the time of scheduling. 23     Mildred Urbina, APRN  12/9/2021        "

## 2021-12-15 ENCOUNTER — LAB (OUTPATIENT)
Dept: LAB | Facility: HOSPITAL | Age: 76
End: 2021-12-15

## 2021-12-15 LAB — SARS-COV-2 ORF1AB RESP QL NAA+PROBE: NOT DETECTED

## 2021-12-15 PROCEDURE — C9803 HOPD COVID-19 SPEC COLLECT: HCPCS

## 2021-12-15 PROCEDURE — U0004 COV-19 TEST NON-CDC HGH THRU: HCPCS

## 2021-12-17 ENCOUNTER — HOSPITAL ENCOUNTER (OUTPATIENT)
Facility: HOSPITAL | Age: 76
Discharge: HOME HEALTH | End: 2021-12-18
Attending: ORTHOPAEDIC SURGERY | Admitting: ORTHOPAEDIC SURGERY

## 2021-12-17 ENCOUNTER — APPOINTMENT (OUTPATIENT)
Dept: GENERAL RADIOLOGY | Facility: HOSPITAL | Age: 76
End: 2021-12-17

## 2021-12-17 ENCOUNTER — ANESTHESIA EVENT (OUTPATIENT)
Dept: PERIOP | Facility: HOSPITAL | Age: 76
End: 2021-12-17

## 2021-12-17 ENCOUNTER — ANESTHESIA (OUTPATIENT)
Dept: PERIOP | Facility: HOSPITAL | Age: 76
End: 2021-12-17

## 2021-12-17 DIAGNOSIS — Z98.890 S/P KNEE SURGERY: Primary | ICD-10-CM

## 2021-12-17 DIAGNOSIS — M17.12 PRIMARY OSTEOARTHRITIS OF LEFT KNEE: ICD-10-CM

## 2021-12-17 PROCEDURE — 27447 TOTAL KNEE ARTHROPLASTY: CPT | Performed by: ORTHOPAEDIC SURGERY

## 2021-12-17 PROCEDURE — 25010000002 VANCOMYCIN 10 G RECONSTITUTED SOLUTION: Performed by: NURSE PRACTITIONER

## 2021-12-17 PROCEDURE — 97116 GAIT TRAINING THERAPY: CPT

## 2021-12-17 PROCEDURE — C9290 INJ, BUPIVACAINE LIPOSOME: HCPCS | Performed by: ORTHOPAEDIC SURGERY

## 2021-12-17 PROCEDURE — 25010000002 FENTANYL CITRATE (PF) 50 MCG/ML SOLUTION

## 2021-12-17 PROCEDURE — 25010000002 NEOSTIGMINE 5 MG/10ML SOLUTION

## 2021-12-17 PROCEDURE — 25010000002 DEXAMETHASONE PER 1 MG

## 2021-12-17 PROCEDURE — 25010000002 PROPOFOL 10 MG/ML EMULSION

## 2021-12-17 PROCEDURE — C1713 ANCHOR/SCREW BN/BN,TIS/BN: HCPCS | Performed by: ORTHOPAEDIC SURGERY

## 2021-12-17 PROCEDURE — C1889 IMPLANT/INSERT DEVICE, NOC: HCPCS | Performed by: ORTHOPAEDIC SURGERY

## 2021-12-17 PROCEDURE — 25010000002 ROPIVACAINE PER 1 MG: Performed by: ANESTHESIOLOGY

## 2021-12-17 PROCEDURE — 25010000002 PHENYLEPHRINE 10 MG/ML SOLUTION

## 2021-12-17 PROCEDURE — 27447 TOTAL KNEE ARTHROPLASTY: CPT | Performed by: NURSE PRACTITIONER

## 2021-12-17 PROCEDURE — 73560 X-RAY EXAM OF KNEE 1 OR 2: CPT

## 2021-12-17 PROCEDURE — C1776 JOINT DEVICE (IMPLANTABLE): HCPCS | Performed by: ORTHOPAEDIC SURGERY

## 2021-12-17 PROCEDURE — 25010000002 MIDAZOLAM PER 1 MG: Performed by: ANESTHESIOLOGY

## 2021-12-17 PROCEDURE — 25010000002 VANCOMYCIN PER 500 MG: Performed by: NURSE PRACTITIONER

## 2021-12-17 PROCEDURE — 25010000002 ONDANSETRON PER 1 MG

## 2021-12-17 PROCEDURE — 76942 ECHO GUIDE FOR BIOPSY: CPT | Performed by: ORTHOPAEDIC SURGERY

## 2021-12-17 PROCEDURE — 0 BUPIVACAINE LIPOSOME 1.3 % SUSPENSION 20 ML VIAL: Performed by: ORTHOPAEDIC SURGERY

## 2021-12-17 PROCEDURE — 97161 PT EVAL LOW COMPLEX 20 MIN: CPT

## 2021-12-17 DEVICE — VIOLET ANTIBACTERIAL POLYDIOXANONE, KNOTLESS TISSUE CONTROL DEVICE
Type: IMPLANTABLE DEVICE | Site: KNEE | Status: FUNCTIONAL
Brand: STRATAFIX

## 2021-12-17 DEVICE — GENESIS II NON-POROUS TIBIAL                                    BASEPLATE SIZE 6 LT
Type: IMPLANTABLE DEVICE | Site: KNEE | Status: FUNCTIONAL
Brand: GENESIS II

## 2021-12-17 DEVICE — IMPLANTABLE DEVICE: Type: IMPLANTABLE DEVICE | Site: KNEE | Status: FUNCTIONAL

## 2021-12-17 DEVICE — LEGION CRUCIATE RETAINING HIGH                                    FLEX HIGHLY CROSS LINKED                                    POLYETHYLENE SIZE 5-6 11MM
Type: IMPLANTABLE DEVICE | Site: KNEE | Status: FUNCTIONAL
Brand: LEGION

## 2021-12-17 DEVICE — GENESIS II BICONVEX PATELLA 32MM
Type: IMPLANTABLE DEVICE | Site: KNEE | Status: FUNCTIONAL
Brand: GENESIS II

## 2021-12-17 DEVICE — KNOTLESS TISSUE CONTROL DEVICE, UNDYED UNIDIRECTIONAL (ANTIBACTERIAL) SYNTHETIC ABSORBABLE DEVICE
Type: IMPLANTABLE DEVICE | Site: KNEE | Status: FUNCTIONAL
Brand: STRATAFIX

## 2021-12-17 DEVICE — PALACOS® R IS A FAST-CURING, RADIOPAQUE, POLY(METHYL METHACRYLATE)-BASED BONE CEMENT.PALACOS ® R CONTAINS THE X-RAY CONTRAST MEDIUM ZIRCONIUM DIOXIDE. TO IMPROVE VISIBILITY IN THE SURGICAL FIELD PALACOS ® R HAS BEEN COLOURED WITH CHLOROPHYLL (E141). THE BONE CEMENT IS PREPARED DIRECTLY BEFORE USE BY MIXING A POLYMER POWDER COMPONENT WITH A LIQUID MONOMER COMPONENT. A DUCTILE DOUGH FORMS WHICH CURES WITHIN A FEW MINUTES.
Type: IMPLANTABLE DEVICE | Site: KNEE | Status: FUNCTIONAL
Brand: PALACOS®

## 2021-12-17 DEVICE — LEGION CRUCIATE RETAINING OXINIUM                                    FEMORAL SIZE 5 LEFT
Type: IMPLANTABLE DEVICE | Site: KNEE | Status: FUNCTIONAL
Brand: LEGION

## 2021-12-17 RX ORDER — PROMETHAZINE HYDROCHLORIDE 25 MG/1
25 TABLET ORAL ONCE AS NEEDED
Status: DISCONTINUED | OUTPATIENT
Start: 2021-12-17 | End: 2021-12-17 | Stop reason: HOSPADM

## 2021-12-17 RX ORDER — TAMSULOSIN HYDROCHLORIDE 0.4 MG/1
0.4 CAPSULE ORAL NIGHTLY
Status: DISCONTINUED | OUTPATIENT
Start: 2021-12-17 | End: 2021-12-18 | Stop reason: HOSPADM

## 2021-12-17 RX ORDER — FENTANYL CITRATE 50 UG/ML
INJECTION, SOLUTION INTRAMUSCULAR; INTRAVENOUS AS NEEDED
Status: DISCONTINUED | OUTPATIENT
Start: 2021-12-17 | End: 2021-12-17 | Stop reason: SURG

## 2021-12-17 RX ORDER — DIPHENHYDRAMINE HYDROCHLORIDE 50 MG/ML
12.5 INJECTION INTRAMUSCULAR; INTRAVENOUS
Status: DISCONTINUED | OUTPATIENT
Start: 2021-12-17 | End: 2021-12-17 | Stop reason: HOSPADM

## 2021-12-17 RX ORDER — POLYETHYLENE GLYCOL 3350 17 G/17G
17 POWDER, FOR SOLUTION ORAL 2 TIMES DAILY
Status: DISCONTINUED | OUTPATIENT
Start: 2021-12-17 | End: 2021-12-18 | Stop reason: HOSPADM

## 2021-12-17 RX ORDER — PROPOFOL 10 MG/ML
VIAL (ML) INTRAVENOUS AS NEEDED
Status: DISCONTINUED | OUTPATIENT
Start: 2021-12-17 | End: 2021-12-17 | Stop reason: SURG

## 2021-12-17 RX ORDER — PANTOPRAZOLE SODIUM 40 MG/1
40 TABLET, DELAYED RELEASE ORAL DAILY
Qty: 14 TABLET | Refills: 0 | Status: SHIPPED | OUTPATIENT
Start: 2021-12-17 | End: 2022-01-01

## 2021-12-17 RX ORDER — ACETAMINOPHEN 325 MG/1
325 TABLET ORAL EVERY 4 HOURS PRN
Status: DISCONTINUED | OUTPATIENT
Start: 2021-12-17 | End: 2021-12-18 | Stop reason: HOSPADM

## 2021-12-17 RX ORDER — ONDANSETRON 4 MG/1
4 TABLET, FILM COATED ORAL EVERY 6 HOURS PRN
Status: DISCONTINUED | OUTPATIENT
Start: 2021-12-17 | End: 2021-12-18 | Stop reason: HOSPADM

## 2021-12-17 RX ORDER — CLINDAMYCIN PHOSPHATE 900 MG/50ML
INJECTION INTRAVENOUS AS NEEDED
Status: DISCONTINUED | OUTPATIENT
Start: 2021-12-17 | End: 2021-12-17 | Stop reason: SURG

## 2021-12-17 RX ORDER — PREGABALIN 75 MG/1
150 CAPSULE ORAL ONCE
Status: COMPLETED | OUTPATIENT
Start: 2021-12-17 | End: 2021-12-17

## 2021-12-17 RX ORDER — NALOXONE HCL 0.4 MG/ML
0.2 VIAL (ML) INJECTION AS NEEDED
Status: DISCONTINUED | OUTPATIENT
Start: 2021-12-17 | End: 2021-12-17 | Stop reason: HOSPADM

## 2021-12-17 RX ORDER — MAGNESIUM HYDROXIDE 1200 MG/15ML
LIQUID ORAL AS NEEDED
Status: DISCONTINUED | OUTPATIENT
Start: 2021-12-17 | End: 2021-12-17 | Stop reason: HOSPADM

## 2021-12-17 RX ORDER — ONDANSETRON 2 MG/ML
INJECTION INTRAMUSCULAR; INTRAVENOUS AS NEEDED
Status: DISCONTINUED | OUTPATIENT
Start: 2021-12-17 | End: 2021-12-17 | Stop reason: SURG

## 2021-12-17 RX ORDER — HYDROCODONE BITARTRATE AND ACETAMINOPHEN 7.5; 325 MG/1; MG/1
2 TABLET ORAL EVERY 4 HOURS PRN
Status: DISCONTINUED | OUTPATIENT
Start: 2021-12-17 | End: 2021-12-18 | Stop reason: HOSPADM

## 2021-12-17 RX ORDER — OXYCODONE AND ACETAMINOPHEN 7.5; 325 MG/1; MG/1
1 TABLET ORAL EVERY 4 HOURS PRN
Status: DISCONTINUED | OUTPATIENT
Start: 2021-12-17 | End: 2021-12-17 | Stop reason: HOSPADM

## 2021-12-17 RX ORDER — ASPIRIN 81 MG/1
81 TABLET ORAL EVERY 12 HOURS SCHEDULED
Status: DISCONTINUED | OUTPATIENT
Start: 2021-12-18 | End: 2021-12-18 | Stop reason: HOSPADM

## 2021-12-17 RX ORDER — UREA 10 %
3 LOTION (ML) TOPICAL NIGHTLY PRN
Status: DISCONTINUED | OUTPATIENT
Start: 2021-12-17 | End: 2021-12-18 | Stop reason: HOSPADM

## 2021-12-17 RX ORDER — SODIUM CHLORIDE, SODIUM LACTATE, POTASSIUM CHLORIDE, CALCIUM CHLORIDE 600; 310; 30; 20 MG/100ML; MG/100ML; MG/100ML; MG/100ML
9 INJECTION, SOLUTION INTRAVENOUS CONTINUOUS PRN
Status: DISCONTINUED | OUTPATIENT
Start: 2021-12-17 | End: 2021-12-17 | Stop reason: HOSPADM

## 2021-12-17 RX ORDER — MELOXICAM 15 MG/1
15 TABLET ORAL ONCE
Status: COMPLETED | OUTPATIENT
Start: 2021-12-17 | End: 2021-12-17

## 2021-12-17 RX ORDER — MIDAZOLAM HYDROCHLORIDE 1 MG/ML
INJECTION INTRAMUSCULAR; INTRAVENOUS
Status: COMPLETED | OUTPATIENT
Start: 2021-12-17 | End: 2021-12-17

## 2021-12-17 RX ORDER — ROCURONIUM BROMIDE 10 MG/ML
INJECTION, SOLUTION INTRAVENOUS AS NEEDED
Status: DISCONTINUED | OUTPATIENT
Start: 2021-12-17 | End: 2021-12-17 | Stop reason: SURG

## 2021-12-17 RX ORDER — LIDOCAINE HYDROCHLORIDE 20 MG/ML
INJECTION, SOLUTION INFILTRATION; PERINEURAL AS NEEDED
Status: DISCONTINUED | OUTPATIENT
Start: 2021-12-17 | End: 2021-12-17 | Stop reason: SURG

## 2021-12-17 RX ORDER — HYDROCODONE BITARTRATE AND ACETAMINOPHEN 7.5; 325 MG/1; MG/1
1 TABLET ORAL EVERY 4 HOURS PRN
Status: DISCONTINUED | OUTPATIENT
Start: 2021-12-17 | End: 2021-12-18 | Stop reason: HOSPADM

## 2021-12-17 RX ORDER — VANCOMYCIN HYDROCHLORIDE 1 G/200ML
15 INJECTION, SOLUTION INTRAVENOUS ONCE
Status: COMPLETED | OUTPATIENT
Start: 2021-12-17 | End: 2021-12-17

## 2021-12-17 RX ORDER — HYDROCODONE BITARTRATE AND ACETAMINOPHEN 7.5; 325 MG/1; MG/1
1 TABLET ORAL ONCE AS NEEDED
Status: DISCONTINUED | OUTPATIENT
Start: 2021-12-17 | End: 2021-12-17 | Stop reason: HOSPADM

## 2021-12-17 RX ORDER — DEXAMETHASONE SODIUM PHOSPHATE 10 MG/ML
INJECTION INTRAMUSCULAR; INTRAVENOUS AS NEEDED
Status: DISCONTINUED | OUTPATIENT
Start: 2021-12-17 | End: 2021-12-17 | Stop reason: SURG

## 2021-12-17 RX ORDER — TRANEXAMIC ACID 100 MG/ML
INJECTION, SOLUTION INTRAVENOUS AS NEEDED
Status: DISCONTINUED | OUTPATIENT
Start: 2021-12-17 | End: 2021-12-17 | Stop reason: SURG

## 2021-12-17 RX ORDER — MIDAZOLAM HYDROCHLORIDE 1 MG/ML
1 INJECTION INTRAMUSCULAR; INTRAVENOUS
Status: DISCONTINUED | OUTPATIENT
Start: 2021-12-17 | End: 2021-12-17 | Stop reason: HOSPADM

## 2021-12-17 RX ORDER — ONDANSETRON 2 MG/ML
4 INJECTION INTRAMUSCULAR; INTRAVENOUS EVERY 6 HOURS PRN
Status: DISCONTINUED | OUTPATIENT
Start: 2021-12-17 | End: 2021-12-18 | Stop reason: HOSPADM

## 2021-12-17 RX ORDER — GLYCOPYRROLATE 0.2 MG/ML
INJECTION INTRAMUSCULAR; INTRAVENOUS AS NEEDED
Status: DISCONTINUED | OUTPATIENT
Start: 2021-12-17 | End: 2021-12-17 | Stop reason: SURG

## 2021-12-17 RX ORDER — ONDANSETRON 2 MG/ML
4 INJECTION INTRAMUSCULAR; INTRAVENOUS ONCE AS NEEDED
Status: DISCONTINUED | OUTPATIENT
Start: 2021-12-17 | End: 2021-12-17 | Stop reason: HOSPADM

## 2021-12-17 RX ORDER — FINASTERIDE 5 MG/1
5 TABLET, FILM COATED ORAL NIGHTLY
Status: DISCONTINUED | OUTPATIENT
Start: 2021-12-17 | End: 2021-12-18 | Stop reason: HOSPADM

## 2021-12-17 RX ORDER — SODIUM CHLORIDE 0.9 % (FLUSH) 0.9 %
10 SYRINGE (ML) INJECTION AS NEEDED
Status: DISCONTINUED | OUTPATIENT
Start: 2021-12-17 | End: 2021-12-17 | Stop reason: HOSPADM

## 2021-12-17 RX ORDER — EPHEDRINE SULFATE 50 MG/ML
5 INJECTION, SOLUTION INTRAVENOUS ONCE AS NEEDED
Status: DISCONTINUED | OUTPATIENT
Start: 2021-12-17 | End: 2021-12-17 | Stop reason: HOSPADM

## 2021-12-17 RX ORDER — MELOXICAM 15 MG/1
15 TABLET ORAL DAILY
Status: DISCONTINUED | OUTPATIENT
Start: 2021-12-18 | End: 2021-12-18 | Stop reason: HOSPADM

## 2021-12-17 RX ORDER — ROPIVACAINE HYDROCHLORIDE 5 MG/ML
INJECTION, SOLUTION EPIDURAL; INFILTRATION; PERINEURAL
Status: COMPLETED | OUTPATIENT
Start: 2021-12-17 | End: 2021-12-17

## 2021-12-17 RX ORDER — SODIUM CHLORIDE 0.9 % (FLUSH) 0.9 %
10 SYRINGE (ML) INJECTION EVERY 12 HOURS SCHEDULED
Status: DISCONTINUED | OUTPATIENT
Start: 2021-12-17 | End: 2021-12-17 | Stop reason: HOSPADM

## 2021-12-17 RX ORDER — HYDROMORPHONE HYDROCHLORIDE 1 MG/ML
0.5 INJECTION, SOLUTION INTRAMUSCULAR; INTRAVENOUS; SUBCUTANEOUS
Status: DISCONTINUED | OUTPATIENT
Start: 2021-12-17 | End: 2021-12-17 | Stop reason: HOSPADM

## 2021-12-17 RX ORDER — POLYETHYLENE GLYCOL 3350 17 G/17G
17 POWDER, FOR SOLUTION ORAL 2 TIMES DAILY
Qty: 238 G | Refills: 0 | Status: SHIPPED | OUTPATIENT
Start: 2021-12-17 | End: 2021-12-25

## 2021-12-17 RX ORDER — HYDRALAZINE HYDROCHLORIDE 20 MG/ML
5 INJECTION INTRAMUSCULAR; INTRAVENOUS
Status: DISCONTINUED | OUTPATIENT
Start: 2021-12-17 | End: 2021-12-17 | Stop reason: HOSPADM

## 2021-12-17 RX ORDER — ASPIRIN 81 MG/1
81 TABLET ORAL 2 TIMES DAILY
Qty: 60 TABLET | Refills: 0 | Status: SHIPPED | OUTPATIENT
Start: 2021-12-18 | End: 2022-01-17

## 2021-12-17 RX ORDER — FAMOTIDINE 10 MG/ML
20 INJECTION, SOLUTION INTRAVENOUS
Status: COMPLETED | OUTPATIENT
Start: 2021-12-17 | End: 2021-12-17

## 2021-12-17 RX ORDER — FENTANYL CITRATE 50 UG/ML
50 INJECTION, SOLUTION INTRAMUSCULAR; INTRAVENOUS
Status: DISCONTINUED | OUTPATIENT
Start: 2021-12-17 | End: 2021-12-17 | Stop reason: HOSPADM

## 2021-12-17 RX ORDER — EPHEDRINE SULFATE 50 MG/ML
INJECTION, SOLUTION INTRAVENOUS AS NEEDED
Status: DISCONTINUED | OUTPATIENT
Start: 2021-12-17 | End: 2021-12-17 | Stop reason: SURG

## 2021-12-17 RX ORDER — PHENYLEPHRINE HYDROCHLORIDE 10 MG/ML
INJECTION INTRAVENOUS AS NEEDED
Status: DISCONTINUED | OUTPATIENT
Start: 2021-12-17 | End: 2021-12-17 | Stop reason: SURG

## 2021-12-17 RX ORDER — FLUMAZENIL 0.1 MG/ML
0.2 INJECTION INTRAVENOUS AS NEEDED
Status: DISCONTINUED | OUTPATIENT
Start: 2021-12-17 | End: 2021-12-17 | Stop reason: HOSPADM

## 2021-12-17 RX ORDER — HYDROCODONE BITARTRATE AND ACETAMINOPHEN 7.5; 325 MG/1; MG/1
TABLET ORAL
Qty: 60 TABLET | Refills: 0 | Status: SHIPPED | OUTPATIENT
Start: 2021-12-17 | End: 2022-02-17

## 2021-12-17 RX ORDER — DIPHENHYDRAMINE HCL 25 MG
25 CAPSULE ORAL
Status: DISCONTINUED | OUTPATIENT
Start: 2021-12-17 | End: 2021-12-17 | Stop reason: HOSPADM

## 2021-12-17 RX ORDER — ONDANSETRON 4 MG/1
4 TABLET, FILM COATED ORAL EVERY 8 HOURS PRN
Qty: 10 TABLET | Refills: 0 | Status: SHIPPED | OUTPATIENT
Start: 2021-12-17 | End: 2022-02-17

## 2021-12-17 RX ORDER — IBUPROFEN 600 MG/1
600 TABLET ORAL ONCE AS NEEDED
Status: DISCONTINUED | OUTPATIENT
Start: 2021-12-17 | End: 2021-12-17 | Stop reason: HOSPADM

## 2021-12-17 RX ORDER — MELOXICAM 15 MG/1
15 TABLET ORAL DAILY
Qty: 14 TABLET | Refills: 0 | Status: SHIPPED | OUTPATIENT
Start: 2021-12-17 | End: 2021-12-31

## 2021-12-17 RX ORDER — NEOSTIGMINE METHYLSULFATE 0.5 MG/ML
INJECTION, SOLUTION INTRAVENOUS AS NEEDED
Status: DISCONTINUED | OUTPATIENT
Start: 2021-12-17 | End: 2021-12-17 | Stop reason: SURG

## 2021-12-17 RX ORDER — PROMETHAZINE HYDROCHLORIDE 25 MG/1
25 SUPPOSITORY RECTAL ONCE AS NEEDED
Status: DISCONTINUED | OUTPATIENT
Start: 2021-12-17 | End: 2021-12-17 | Stop reason: HOSPADM

## 2021-12-17 RX ORDER — CARVEDILOL 3.12 MG/1
3.12 TABLET ORAL 2 TIMES DAILY WITH MEALS
Status: DISCONTINUED | OUTPATIENT
Start: 2021-12-17 | End: 2021-12-18 | Stop reason: HOSPADM

## 2021-12-17 RX ADMIN — PHENYLEPHRINE HYDROCHLORIDE 100 MCG: 10 INJECTION, SOLUTION INTRAVENOUS at 07:25

## 2021-12-17 RX ADMIN — SODIUM CHLORIDE, POTASSIUM CHLORIDE, SODIUM LACTATE AND CALCIUM CHLORIDE: 600; 310; 30; 20 INJECTION, SOLUTION INTRAVENOUS at 06:56

## 2021-12-17 RX ADMIN — DEXAMETHASONE SODIUM PHOSPHATE 8 MG: 10 INJECTION INTRAMUSCULAR; INTRAVENOUS at 07:10

## 2021-12-17 RX ADMIN — VANCOMYCIN HYDROCHLORIDE 1250 MG: 10 INJECTION, POWDER, LYOPHILIZED, FOR SOLUTION INTRAVENOUS at 18:18

## 2021-12-17 RX ADMIN — HYDROCODONE BITARTRATE AND ACETAMINOPHEN 2 TABLET: 7.5; 325 TABLET ORAL at 20:06

## 2021-12-17 RX ADMIN — PHENYLEPHRINE HYDROCHLORIDE 100 MCG: 10 INJECTION, SOLUTION INTRAVENOUS at 07:22

## 2021-12-17 RX ADMIN — ROPIVACAINE HYDROCHLORIDE 20 ML: 5 INJECTION, SOLUTION EPIDURAL; INFILTRATION; PERINEURAL at 06:29

## 2021-12-17 RX ADMIN — FENTANYL CITRATE 25 MCG: 0.05 INJECTION, SOLUTION INTRAMUSCULAR; INTRAVENOUS at 08:29

## 2021-12-17 RX ADMIN — EPHEDRINE SULFATE 5 MG: 50 INJECTION INTRAVENOUS at 07:54

## 2021-12-17 RX ADMIN — PHENYLEPHRINE HYDROCHLORIDE 100 MCG: 10 INJECTION, SOLUTION INTRAVENOUS at 07:38

## 2021-12-17 RX ADMIN — CLINDAMYCIN IN 5 PERCENT DEXTROSE 900 MG: 18 INJECTION, SOLUTION INTRAVENOUS at 07:07

## 2021-12-17 RX ADMIN — MIDAZOLAM 1 MG: 1 INJECTION INTRAMUSCULAR; INTRAVENOUS at 06:27

## 2021-12-17 RX ADMIN — ONDANSETRON 4 MG: 2 INJECTION INTRAMUSCULAR; INTRAVENOUS at 08:09

## 2021-12-17 RX ADMIN — MUPIROCIN 1 APPLICATION: 20 OINTMENT TOPICAL at 20:07

## 2021-12-17 RX ADMIN — FAMOTIDINE 20 MG: 10 INJECTION INTRAVENOUS at 06:22

## 2021-12-17 RX ADMIN — FENTANYL CITRATE 50 MCG: 0.05 INJECTION, SOLUTION INTRAMUSCULAR; INTRAVENOUS at 07:08

## 2021-12-17 RX ADMIN — POLYETHYLENE GLYCOL 3350 17 G: 17 POWDER, FOR SOLUTION ORAL at 15:54

## 2021-12-17 RX ADMIN — PHENYLEPHRINE HYDROCHLORIDE 100 MCG: 10 INJECTION, SOLUTION INTRAVENOUS at 08:09

## 2021-12-17 RX ADMIN — NEOSTIGMINE METHYLSULFATE 3 MG: 0.5 INJECTION INTRAVENOUS at 08:24

## 2021-12-17 RX ADMIN — EPHEDRINE SULFATE 10 MG: 50 INJECTION INTRAVENOUS at 08:09

## 2021-12-17 RX ADMIN — ROCURONIUM BROMIDE 50 MG: 50 INJECTION INTRAVENOUS at 07:01

## 2021-12-17 RX ADMIN — EPHEDRINE SULFATE 5 MG: 50 INJECTION INTRAVENOUS at 07:46

## 2021-12-17 RX ADMIN — FINASTERIDE 5 MG: 5 TABLET, FILM COATED ORAL at 20:07

## 2021-12-17 RX ADMIN — VANCOMYCIN HYDROCHLORIDE 1000 MG: 1 INJECTION, SOLUTION INTRAVENOUS at 06:22

## 2021-12-17 RX ADMIN — PROPOFOL 150 MG: 10 INJECTION, EMULSION INTRAVENOUS at 07:01

## 2021-12-17 RX ADMIN — TRANEXAMIC ACID 1000 MG: 1 INJECTION, SOLUTION INTRAVENOUS at 07:52

## 2021-12-17 RX ADMIN — FENTANYL CITRATE 25 MCG: 0.05 INJECTION, SOLUTION INTRAMUSCULAR; INTRAVENOUS at 08:16

## 2021-12-17 RX ADMIN — TAMSULOSIN HYDROCHLORIDE 0.4 MG: 0.4 CAPSULE ORAL at 20:06

## 2021-12-17 RX ADMIN — LIDOCAINE HYDROCHLORIDE 80 MG: 20 INJECTION, SOLUTION INFILTRATION; PERINEURAL at 07:01

## 2021-12-17 RX ADMIN — PREGABALIN 150 MG: 75 CAPSULE ORAL at 06:16

## 2021-12-17 RX ADMIN — SODIUM CHLORIDE, POTASSIUM CHLORIDE, SODIUM LACTATE AND CALCIUM CHLORIDE: 600; 310; 30; 20 INJECTION, SOLUTION INTRAVENOUS at 08:06

## 2021-12-17 RX ADMIN — GLYCOPYRROLATE 0.6 MG: 0.2 INJECTION INTRAMUSCULAR; INTRAVENOUS at 08:24

## 2021-12-17 RX ADMIN — MELOXICAM 15 MG: 15 TABLET ORAL at 06:16

## 2021-12-17 RX ADMIN — CARVEDILOL 3.12 MG: 3.12 TABLET, FILM COATED ORAL at 18:19

## 2021-12-17 NOTE — ANESTHESIA PREPROCEDURE EVALUATION
Anesthesia Evaluation     Patient summary reviewed   NPO Solid Status: > 8 hours             Airway   Mallampati: II  Neck ROM: full  No difficulty expected  Dental      Pulmonary    (+) sleep apnea,   Cardiovascular     ECG reviewed  Rhythm: regular    (+) hypertension, CAD,       Neuro/Psych  GI/Hepatic/Renal/Endo      Musculoskeletal     Abdominal    Substance History      OB/GYN          Other   arthritis,                      Anesthesia Plan    ASA 3     general with block       Anesthetic plan, all risks, benefits, and alternatives have been provided, discussed and informed consent has been obtained with: patient.

## 2021-12-17 NOTE — DISCHARGE PLACEMENT REQUEST
"Ashely Zheng (76 y.o. Male)             Date of Birth Social Security Number Address Home Phone MRN    1945  [address] [place] [address] [phone] [id number]    Yarsani Marital Status             [employer]        Admission Date Admission Type Admitting Provider Attending Provider Department, Room/Bed    12/17/21 Elective Neal Goodwin MD Brown, Reid B, MD [address], P884/1    Discharge Date Discharge Disposition Discharge Destination           Home-Health Care [facility] [place]              Attending Provider: Neal Goodwin MD    Allergies: Ace Inhibitors, Beta Adrenergic Blockers, Sulfa Antibiotics, Cephalexin    Isolation: None   Infection: None   Code Status: Not on file   Advance Care Planning Activity    Ht: 172.7 cm (68\")   Wt: 77.9 kg (171 lb 11.8 oz)    Admission Cmt: None   Principal Problem: Primary osteoarthritis of left knee [M17.12] More...                 Active Insurance as of 12/17/2021     Primary Coverage     Payor Plan Insurance Group Employer/Plan Group    ANTH BLUE CROSS ANTH BLUE CROSS BLUE SHIELD PPO [id number]     Payor Plan Address Payor Plan Phone Number Payor Plan Fax Number Effective Dates    [address] [phone]  7/1/2021 - None Entered    [facility] [id number]       Subscriber Name Subscriber Birth Date Member ID       ASHELY ZHENG 1945 [id number]           Secondary Coverage     Payor Plan Insurance Group Employer/Plan Group    MEDICARE MEDICARE A ONLY      Payor Plan Address Payor Plan Phone Number Payor Plan Fax Number Effective Dates    [address] [phone]  12/1/2011 - None Entered    [facility] [id number]       Subscriber Name Subscriber Birth Date Member ID       ASHELY ZHENG 1945 [id number]                 Emergency Contacts      (Rel.) Home Phone Work Phone Mobile Phone    Bre Cuevas (Spouse) [phone] -- --            "

## 2021-12-17 NOTE — THERAPY EVALUATION
Patient Name: Chuck Zheng  : 1945    MRN: [id number]                              Today's Date: 2021       Admit Date: 2021    Visit Dx:     ICD-10-CM ICD-9-CM   1. S/P knee surgery  Z98.890 V45.89   2. Primary osteoarthritis of left knee  M17.12 715.16     Patient Active Problem List   Diagnosis   • Atopic rhinitis   • Benign essential hypertension   • Atherosclerosis of coronary artery   • Impaired fasting blood sugar   • Pure hypercholesterolemia   • Cobalamin deficiency   • Primary insomnia   • Health care maintenance   • Elevated PSA, less than 10 ng/ml   • PLACIDO on CPAP Check Pressure +11.5   • History of myocardial infarction   • Benign prostatic hyperplasia with incomplete bladder emptying   • Fatty liver   • Left anterior shoulder pain   • Primary osteoarthritis of left knee     Past Medical History:   Diagnosis Date   • Adhesive capsulitis of shoulder    • Arthritis    • Benign non-nodular prostatic hyperplasia 2016   • Benign prostatic hypertrophy    • Coronary artery disease    • Functional diarrhea 2017    Resolved with gluten free diet   • H/O cardiovascular stress test      normal   • Hemorrhoid    • History of COVID-19 2020   • Hyperlipidemia    • Hypertension    • Myocardial infarction (HCC)      2007, distal RCA 80% stenosis, s/p PTCA   • Nephrolithiasis     left kidney   • Obstructive sleep apnea     BIPAP   • Primary insomnia 2016   • Rupture of flexor tendon of hand    • Syncope     2004 neg w/u     Past Surgical History:   Procedure Laterality Date   • CHOLECYSTECTOMY     • COLONOSCOPY      2003, nl, , 2014 , nl, needs C-scope in    • CORONARY STENT PLACEMENT      2007 distal RCA   • EYE SURGERY      JOEY CATARACTS W IOL   • HAND SURGERY Right     THUMB SX   • KNEE ARTHROSCOPY Right        • KNEE ARTHROSCOPY Left 1/15/2019    Procedure: LEFT KNEE ARTHROSCOPY,  PARTIAL MEDIAL MENISECTOMY;  Surgeon: Mason Ochoa  MD Rufus;  [facility] [place]  Service: Orthopedics   • TONSILLECTOMY        General Information     [facility] Name 12/17/21 1240          Physical Therapy Time and Intention    Document Type evaluation  -     Mode of Treatment individual therapy; physical therapy  -     Row Name 12/17/21 1240          General Information    Patient Profile Reviewed yes  -     Prior Level of Function independent:  -     Existing Precautions/Restrictions no known precautions/restrictions  -     Barriers to Rehab none identified  -     Row Name 12/17/21 1240          Living Environment    Lives With spouse  -     Row Name 12/17/21 1240          Home Main Entrance    Number of Stairs, Main Entrance six  -     Stair Railings, Main Entrance railing on right side (ascending)  -     Row Name 12/17/21 1240          Stairs Within Home, Primary    Number of Stairs, Within Home, Primary other (see comments)  16 steps up to bedroom/bathroom  -     Stair Railings, Within Home, Primary railing on right side (ascending)  -[facility] Name 12/17/21 1240          Cognition    Orientation Status (Cognition) oriented x 4  -     Row Name 12/17/21 1240          Safety Issues, Functional Mobility    Impairments Affecting Function (Mobility) balance; endurance/activity tolerance; strength; pain; range of motion (ROM)  -           User Key  (r) = Recorded By, (t) = Taken By, (c) = Cosigned By    Initials Name Provider Type     Lou Onofre Physical Therapist               Mobility     Row Name 12/17/21 1241          Bed Mobility    Bed Mobility supine-sit; sit-supine  -     Supine-Sit [place] (Bed Mobility) supervision; verbal cues  -     Sit-Supine [place] (Bed Mobility) not tested  [facility]  -     Assistive Device (Bed Mobility) bed rails; head of bed elevated  -     Row Name 12/17/21 1241          Sit-Stand Transfer    Sit-Stand [place]on (Transfers) supervision; verbal cues  -     Assistive Device  (Sit-Stand Transfers) walker, front-wheeled  -[facility] Name 12/17/21 1241          Gait/Stairs (Locomotion)    [place] Level (Gait) contact guard; verbal cues; nonverbal cues (demo/gesture); 1 person assist  -     Assistive Device (Gait) walker, front-wheeled  -     Distance in Feet (Gait) 150ft  -     Deviations/Abnormal Patterns (Gait) antalgic; gait speed decreased; maya decreased; stride length decreased; weight shifting decreased  -     Bilateral Gait Deviations forward flexed posture; heel strike decreased  -     [place] Level (Stairs) not tested  -     Comment (Gait/Stairs) Tolerated gait well, distance limited 2/2 fatigue/dizziness, pt mildly unsteady with gait with intermittent L knee buckling  -[facility] Name 12/17/21 1241          Mobility    Extremity Weight-bearing Status left lower extremity  -     Left Lower Extremity (Weight-bearing Status) weight-bearing as tolerated (WBAT)  -           User Key  (r) = Recorded By, (t) = Taken By, (c) = Cosigned By    Initials Name Provider Type     Lou Onofre Physical Therapist               Obj/Interventions     [facility] Name 12/17/21 1243          Range of Motion Comprehensive    General Range of Motion lower extremity range of motion deficits identified  -     Comment, General Range of Motion Expected post-op ROM deficits  -BH     Row Name 12/17/21 1243          Strength Comprehensive (MMT)    General Manual Muscle Testing (MMT) Assessment lower extremity strength deficits identified  -     Comment, General Manual Muscle Testing (MMT) Assessment Expected post-op strength deficits, BLE grossly 4/5  -[facility] Name 12/17/21 1243          Motor Skills    Therapeutic Exercise --  10 reps TKA protocol  -BH     Row Name 12/17/21 1243          Balance    Balance Assessment sitting static balance; standing static balance  -     Static Sitting Balance WFL; unsupported; sitting, edge of bed  -     Static Standing Balance WFL;  unsupported; standing  -     Row Name 12/17/21 1243          Sensory Assessment (Somatosensory)    Sensory Assessment (Somatosensory) sensation intact  -           User Key  (r) = Recorded By, (t) = Taken By, (c) = Cosigned By    Initials Name Provider Type     Lou Onofre Physical Therapist               Goals/Plan     Row Name 12/17/21 1248          Bed Mobility Goal 1 (PT)    Activity/Assistive Device (Bed Mobility Goal 1, PT) bed mobility activities, all  -     [place] Level/Cues Needed (Bed Mobility Goal 1, PT) independent  -     Time Frame (Bed Mobility Goal 1, PT) 1 week  -[facility] Name 12/17/21 1248          Transfer Goal 1 (PT)    Activity/Assistive Device (Transfer Goal 1, PT) transfers, all  -     [place] Level/Cues Needed (Transfer Goal 1, PT) independent  -     Time Frame (Transfer Goal 1, PT) 1 week  -[facility] Name 12/17/21 1248          Gait Training Goal 1 (PT)    Activity/Assistive Device (Gait Training Goal 1, PT) gait (walking locomotion)  -     [place] Level (Gait Training Goal 1, PT) modified independence  -     Distance (Gait Training Goal 1, PT) 200ft  -     Time Frame (Gait Training Goal 1, PT) 1 week  -     Row Name 12/17/21 1248          Stairs Goal 1 (PT)    Activity/Assistive Device (Stairs Goal 1, PT) stairs, all skills  -     [place] Level/Cues Needed (Stairs Goal 1, PT) contact guard assist  -     Number of Stairs (Stairs Goal 1, PT) 6  -     Time Frame (Stairs Goal 1, PT) 1 week  -           User Key  (r) = Recorded By, (t) = Taken By, (c) = Cosigned By    Initials Name Provider Type     Lou Onofre Physical Therapist               Clinical Impression     Row Name 12/17/21 1243          Pain    Additional Documentation Pain Scale: Numbers Pre/Post-Treatment (Group)  -     Row Name 12/17/21 1243          Pain Scale: Numbers Pre/Post-Treatment    Pretreatment Pain Rating 0/10 - no pain  -     Posttreatment Pain Rating  2/10  [facility]     Pain Location - Side Left  -     Pain Location - Orientation incisional  -     Pain Location knee  -     Pain Intervention(s) Repositioned; Cold applied; Ambulation/increased activity  -     Row Name 12/17/21 1249          Plan of Care Review    Plan of Care Reviewed With patient  -     Outcome Summary Pt is a 75 yo M POD 0 L TKA. Pt lives with his wife who is able to help him as needed, reports independence at BL with no AD use, but walker ordered. Pt reports 6 ONI with R HR and 16 steps up to bedroom/bathroom with no options on first floor to sleep. Pt presents to PT with impaired strength and endurance and limited overall mobility. Pt completed bed mobility and STS with supervision and rwx. Pt ambulated 150ft with CGA and rwx and demonstrated overall unsteadiness and intermittent L knee buckling. Pt reported minimal pain throughout session, but reports wanting to practice stairs in AM prior to D/C. Pt will continue to benefit from skilled PT to improve strength and endurance in order to progress gait and improve overall independent functional mobility. PT recommending D/C home with HHPT.  -     Row Name 12/17/21 1240          Therapy Assessment/Plan (PT)    Patient/Family Therapy Goals Statement (PT) Return to OF  -     Rehab Potential (PT) good, to achieve stated therapy goals  [facility]     Criteria for Skilled Interventions Met (PT) yes  -     Row Name 12/17/21 1246          Positioning and Restraints    Pre-Treatment Position in bed  -     Post Treatment Position chair  -     In Chair reclined; call light within reach; exit alarm on; encouraged to call for assist  -           User Key  (r) = Recorded By, (t) = Taken By, (c) = Cosigned By    Initials Name Provider Type     Lou Onofre Physical Therapist               Outcome Measures     Row Name 12/17/21 4516          How much help from another person do you currently need...    Turning from your back to your side while in  flat bed without using bedrails? 3  -BH     Moving from lying on back to sitting on the side of a flat bed without bedrails? 3  -BH     Moving to and from a bed to a chair (including a wheelchair)? 4  -BH     Standing up from a chair using your arms (e.g., wheelchair, bedside chair)? 4  -BH     Climbing 3-5 steps with a railing? 3  -BH     To walk in hospital room? 3  -BH     AM-PAC 6 Clicks Score (PT) 20  -     Row Name 12/17/21 1248          Functional Assessment    Outcome Measure Options AM-PAC 6 Clicks Basic Mobility (PT)  -           User Key  (r) = Recorded By, (t) = Taken By, (c) = Cosigned By    Initials Name Provider Type     Lou Onofre Physical Therapist                             Physical Therapy Education                 Title: PT OT SLP Therapies (Done)     Topic: Physical Therapy (Done)     Point: Mobility training (Done)     Learning Progress Summary           Patient Acceptance, E,D,TB, VU,NR by  at 12/17/2021 1249                   Point: Home exercise program (Done)     Learning Progress Summary           Patient Acceptance, E,D,TB, VU,NR by  at 12/17/2021 1249                   Point: Body mechanics (Done)     Learning Progress Summary           Patient Acceptance, E,D,TB, VU,NR by  at 12/17/2021 1249                   Point: Precautions (Done)     Learning Progress Summary           Patient Acceptance, E,D,TB, VU,NR by  at 12/17/2021 1249                               User Key     Initials Effective Dates Name Provider Type Discipline     05/10/21 -  Lou Onofre Physical Therapist PT              PT Recommendation and Plan  Planned Therapy Interventions (PT): balance training, bed mobility training, gait training, home exercise program, patient/family education, stair training, strengthening, transfer training  Plan of Care Reviewed With: patient  Outcome Summary: Pt is a 77 yo M POD 0 L TKA. Pt lives with his wife who is able to help him as needed, reports independence  at BL with no AD use, but walker ordered. Pt reports 6 ONI with R HR and 16 steps up to bedroom/bathroom with no options on first floor to sleep. Pt presents to PT with impaired strength and endurance and limited overall mobility. Pt completed bed mobility and STS with supervision and rwx. Pt ambulated 150ft with CGA and rwx and demonstrated overall unsteadiness and intermittent L knee buckling. Pt reported minimal pain throughout session, but reports wanting to practice stairs in AM prior to D/C. Pt will continue to benefit from skilled PT to improve strength and endurance in order to progress gait and improve overall independent functional mobility. PT recommending D/C home with HHPT.     Time Calculation:    PT Charges     Row Name 12/17/21 1254             Time Calculation    Start Time 1108  -      Stop Time 1129  -      Time Calculation (min) 21 min  -      PT Received On 12/17/21  -      PT - Next Appointment 12/18/21  -      PT Goal Re-Cert Due Date 12/24/21  -              Time Calculation- PT    Total Timed Code Minutes- PT 16 minute(s)  -              Timed Charges    72938 - Gait Training Minutes  10  -      36854 - PT Therapeutic Activity Minutes 6  -              Untimed Charges    PT Eval/Re-eval Minutes 5  -              Total Minutes    Timed Charges Total Minutes 16  -      Untimed Charges Total Minutes 5  -       Total Minutes 21  -            User Key  (r) = Recorded By, (t) = Taken By, (c) = Cosigned By    Initials Name Provider Type     Lou Onofre Physical Therapist              Therapy Charges for Today     Code Description Service Date Service Provider Modifiers Qty    51602361902 HC GAIT TRAINING EA 15 MIN 12/17/2021 Lou Onofre GP 1    [id number] HC PT EVAL LOW COMPLEXITY 2 12/17/2021 Lou Onofre GP 1          PT G-Codes  Outcome Measure Options: AM-PAC 6 Clicks Basic Mobility (PT)  AM-PAC 6 Clicks Score (PT): 20    LOU ONOFRE  12/17/2021

## 2021-12-17 NOTE — CASE MANAGEMENT/SOCIAL WORK
Discharge Planning Assessment  [facility]     Patient Name: Chuck Zheng  MRN: [id number]  Today's Date: 12/17/2021    Admit Date: 12/17/2021     Discharge Needs Assessment     Row Name 12/17/21 1350       Living Environment    Lives With spouse    Current Living Arrangements home/apartment/condo    Primary Care Provided by self    Provides Primary Care For no one    Family Caregiver if Needed spouse    Quality of Family Relationships helpful; involved    Able to Return to Prior Arrangements yes       Resource/Environmental Concerns    Resource/Environmental Concerns none       Transition Planning    Patient/Family Anticipates Transition to home with family    Patient/Family Anticipated Services at Transition home health care    Transportation Anticipated family or friend will provide       Discharge Needs Assessment    Readmission Within the Last 30 Days no previous admission in last 30 days    Current Outpatient/Agency/Support Group homecare agency    Equipment Currently Used at Home cpap    Concerns to be Addressed discharge planning    Anticipated Changes Related to Illness none    Equipment Needed After Discharge walker, rolling    Outpatient/Agency/Support Group Needs homecare agency    Discharge Facility/Level of Care Needs home with home health    Provided Post Acute Provider List? Yes    Post Acute Provider List Home Health    Delivered To Patient    Method of Delivery Telephone               Discharge Plan     Row Name 12/17/21 1716       Plan    Plan home with [facility]    Patient/Family in Agreement with Plan yes    Plan Comments Spoke with pt’s pt. Confirmed facesheet correct. Explained role of CCP. Pt lives with his spouse. He is IADLs no DME used. Verified PCP and pharmacy. He has never used HH or SNF. Pt reports he plans home and would like [facility], referral made. Spouse can transport. CCP to follow.              Continued Care and Services - Admitted Since 12/17/2021     Home Medical Care     Service  Provider Request Status Selected Services Address Phone Fax Patient Preferred     [facility]  Pending - Request Sent N/A [address] [address] [phone] --              Expected Discharge Date and Time     Expected Discharge Date Expected Discharge Time    Dec 18, 2021          Demographic Summary    No documentation.                Functional Status    No documentation.                Psychosocial    No documentation.                Abuse/Neglect    No documentation.                Legal    No documentation.                Substance Abuse    No documentation.                Patient Forms    No documentation.                   JAIRO Rojas

## 2021-12-17 NOTE — ANESTHESIA POSTPROCEDURE EVALUATION
"Patient: Chuck Zheng    Procedure Summary     Date: 12/17/21 Room / Location: [facility] OR 09 / [facility] MAIN OR    Anesthesia Start: 0655 Anesthesia Stop: 0845    Procedure: TOTAL KNEE ARTHROPLASTY (Left Knee) Diagnosis:       Primary osteoarthritis of left knee      (Primary osteoarthritis of left knee [M17.12])    Surgeons: Neal Goodwin MD Provider: Bhaskar Najera MD    Anesthesia Type: general with block ASA Status: 3          Anesthesia Type: general with block    Vitals  Vitals Value Taken Time   /70 12/17/21 0946   Temp 36.8 °C (98.3 °F) 12/17/21 0840   Pulse 70 12/17/21 1000   Resp 16 12/17/21 1000   SpO2 93 % 12/17/21 1000           Post Anesthesia Care and Evaluation    Patient location during evaluation: bedside  Patient participation: complete - patient participated  Level of consciousness: awake  Pain score: 2  Pain management: adequate  Airway patency: patent  Anesthetic complications: No anesthetic complications  PONV Status: none  Cardiovascular status: acceptable  Respiratory status: acceptable  Hydration status: acceptable    Comments: /74 (BP Location: Left arm, Patient Position: Lying)   Pulse 81   Temp 36.3 °C (97.4 °F) (Oral)   Resp 18   Ht 172.7 cm (68\")   Wt 77.9 kg (171 lb 11.8 oz)   SpO2 96%   BMI 26.11 kg/m²         "

## 2021-12-17 NOTE — ANESTHESIA PROCEDURE NOTES
Airway  Urgency: elective    Date/Time: 12/17/2021 7:03 AM  Airway not difficult    General Information and Staff    Patient location during procedure: OR  Anesthesiologist: Bhaskar Najera MD  CRNA: Liat Epps CRNA    Indications and Patient Condition  Indications for airway management: airway protection    Preoxygenated: yes  MILS not maintained throughout  Mask difficulty assessment: 1 - vent by mask    Final Airway Details  Final airway type: endotracheal airway      Successful airway: ETT  Cuffed: yes   Successful intubation technique: direct laryngoscopy  Facilitating devices/methods: intubating stylet  Blade: Isac  Blade size: 4  ETT size (mm): 7.5  Cormack-Lehane Classification: grade IIa - partial view of glottis  Placement verified by: chest auscultation and capnometry   Measured from: lips  ETT/EBT  to lips (cm): 22  Number of attempts at approach: 1  Assessment: lips, teeth, and gum same as pre-op and atraumatic intubation

## 2021-12-17 NOTE — OP NOTE
Name: Chuck Zheng  YOB: 1945    DATE OF SURGERY: 12/17/2021    PREOPERATIVE DIAGNOSIS: Left knee end-stage osteoarthritis    POSTOPERATIVE DIAGNOSIS: Left knee end-stage osteoarthritis    PROCEDURE PERFORMED: Left total knee replacement    SURGEON: Neal Goodwin M.D.    ASSISTANT: RAMBO WILLIS    A surgical assistant was integral in ensuring a successful outcome with this procedure.  The assistant was utilized to assist in positioning the patient, draping the patient, was used throughout the case to provide with retraction of tissues, suctioning of blood and body fluids for visualization, positioning of the extremity to allow for proper exposure so that I could perform the procedure.  Without the use of a surgical assistant during this procedure I feel that the outcome may have been compromised or would have been suboptimal or at risk for complications.    IMPLANTS: [employer]:     Implant Name Type Inv. Item Serial No.  Lot No. LRB No. Used Action   CMT BONE PALACOS R HI/VISC 1X40 - [id number] Implant CMT BONE PALACOS R HI/VISC 1X40  [facility] [id number] Left 1 Implanted   DEV CONTRL TISS STRATAFIX SYMM PDS PLUS PAUL CT-1 60CM - [id number] Implant DEV CONTRL TISS STRATAFIX SYMM PDS PLUS PAUL CT-1 60CM  [employer]  DIV [employer] J AND J [id number] Left 1 Implanted   CMT BONE PALACOS R HI/VISC 1X40 - [id number] Implant CMT BONE PALACOS R HI/VISC 1X40  [facility] [id number] Left 1 Implanted   DEV CONTRL TISS STRATAFIXSPIRALMNCRYL PLSPS2 REV3/0 45CM - [id number] Implant DEV CONTRL TISS STRATAFIXSPIRALMNCRYL PLSPS2 REV3/0 45CM  [employer]  DIV OF J AND J [id number] Left 1 Implanted   COMP FEM LEGION OXINIUM CR SZ5 LT - [id number] Implant COMP FEM LEGION OXINIUM CR SZ5 LT  [employer] [id number] Left 1 Implanted   BASE TIB/KN GEN2 NONPOR TI SZ6 LT - [id number] Implant BASE TIB/KN GEN2 NONPOR TI SZ6 LT  [employer] [id number] Left 1 Implanted   PAT GEN2 BICONVEX [id number] -  [id number] Implant PAT GEN2 BICONVEX [id number]  [employer] [id number] Left 1 Implanted   INSRT ART LEGION CR HF XLPE SZ5TO6 11MM - [id number] Implant INSRT ART LEGION CR HF XLPE SZ5TO6 11MM  [employer] [id number] Left 1 Implanted       Estimated Blood Loss: 200cc  Specimens : none  Complications: none    DESCRIPTION OF PROCEDURE: The patient was taken to the operating room and placed in the supine position. A sequential compression device was carefully placed on the non-operative leg. Preoperative antibiotics were administered. Surgical time out was performed. After adequate induction of anesthesia, the leg was prepped and draped in the usual sterile fashion, exsanguinated with an Esmarch bandage and the tourniquet inflated to 250 mmHg. A midline incision was performed followed by a medial parapatellar arthrotomy. The patella was subluxed laterally.  A portion of the fat pad, ACL, and anterior horns of the meniscus were excised. The drill hole was placed in the distal femur and the canal was the irrigated and suctioned. The IM beth was placed and a 5 degree distal valgus cut was performed on the femur. The femur was then sized with a sizing guide. The femoral cutting block was placed and all femoral cuts were performed. The proximal tibia was exposed. We used the extramedullary tibial cutting guide set for removal of 9mm of bone off the high side. The tibial cut was performed. The posterior horns of the menisci were excised. The posterior osteophytes were removed. Flexion extension blocks were then used to balance the knee. The tibial cut surface was then sized with the sizing templates and the tibial and femoral trial were then placed. The knee was placed in full extension and then the tibial tray rotation was then matched to the femoral rotation and marked.    Attention was then placed to the patella. The patella was noted to track centrally through range of motion. The patella was then sized with the  trials. The thickness of the patella was then measured. The patella was resurfaced and the surrounding osteophytes were removed. The preoperative thickness was reproduced. The patella tracked centrally through range of motion.   At this point all trial components were removed, the knee was copiously irrigated with pulsed lavage, and the knee was injected with anesthetic cocktail solution. The cut surfaces were then dried with clean lap sponges, and the components were cemented tibia, followed by femur, then patella. The knee was held in full extension and all excess cement was removed. The knee was held still until the cement had completely hardened. We then placed the trial polyethylene spacer which resulted in full extension and excellent flexion-extension balance. We placed the final polyethylene spacer.   The knee was then copiously irrigated. The tourniquet was then released. There was excellent hemostasis. We placed a one-eighth inch Hemovac drain. We closed the knee in multiple layers in standard fashion. Sterile dressing were applied. At the end of the case, the sponge and needle counts were reported as being correct. There were no known complications. The patient was then transported to the recovery room.      Neal Goodwin M.D.

## 2021-12-17 NOTE — ANESTHESIA PROCEDURE NOTES
Peripheral Block      Patient location during procedure: holding area  Start time: 12/17/2021 6:24 AM  Stop time: 12/17/2021 6:29 AM  Reason for block: at surgeon's request and post-op pain management  Performed by  Anesthesiologist: Bhaskar Najera MD  Preanesthetic Checklist  Completed: patient identified, IV checked, site marked, risks and benefits discussed, surgical consent, monitors and equipment checked, pre-op evaluation and timeout performed  Prep:  Pt Position: supine  Prep: ChloraPrep  Patient monitoring: blood pressure monitoring, continuous pulse oximetry and EKG  Procedure    Sedation: yes    Guidance:ultrasound guided    ULTRASOUND INTERPRETATION.  Using ultrasound guidance a 21 G gauge needle was placed in close proximity to the femoral nerve, at which point, under ultrasound guidance anesthetic was injected in the area of the nerve and spread of the anesthesia was seen on ultrasound in close proximity thereto.  There were no abnormalities seen on ultrasound; a digital image was taken; and the patient tolerated the procedure with no complications. Images:still images obtained, printed/placed on chart    Laterality:left  Block Type:adductor canal block  Injection Technique:single-shot    Medications Used: ropivacaine (NAROPIN) 0.5 % injection, 20 mL  Med administered at 12/17/2021 6:29 AM      Post Assessment  Injection Assessment: negative aspiration for heme, no paresthesia on injection and incremental injection  Patient Tolerance:comfortable throughout block  Complications:no

## 2021-12-17 NOTE — PLAN OF CARE
Goal Outcome Evaluation:  Plan of Care Reviewed With: patient           Outcome Summary: Pt is a 77 yo M POD 0 L TKA. Pt lives with his wife who is able to help him as needed, reports independence at BL with no AD use, but walker ordered. Pt reports 6 ONI with R HR and 16 steps up to bedroom/bathroom with no options on first floor to sleep. Pt presents to PT with impaired strength and endurance and limited overall mobility. Pt completed bed mobility and STS with supervision and rwx. Pt ambulated 150ft with CGA and rwx and demonstrated overall unsteadiness and intermittent L knee buckling. Pt reported minimal pain throughout session, but reports wanting to practice stairs in AM prior to D/C. Pt will continue to benefit from skilled PT to improve strength and endurance in order to progress gait and improve overall independent functional mobility. PT recommending D/C home with HHPT.    Patient was intermittently wearing a face mask during this therapy encounter. Therapist used appropriate personal protective equipment including eye protection, mask, and gloves.  Mask used was standard procedure mask. Appropriate PPE was worn during the entire therapy session. Hand hygiene was completed before and after therapy session. Patient is not in enhanced droplet precautions.

## 2021-12-18 ENCOUNTER — HOME HEALTH ADMISSION (OUTPATIENT)
Dept: HOME HEALTH SERVICES | Facility: HOME HEALTHCARE | Age: 76
End: 2021-12-18

## 2021-12-18 VITALS
RESPIRATION RATE: 19 BRPM | OXYGEN SATURATION: 99 % | HEART RATE: 92 BPM | TEMPERATURE: 97.3 F | SYSTOLIC BLOOD PRESSURE: 134 MMHG | WEIGHT: 171.74 LBS | HEIGHT: 68 IN | BODY MASS INDEX: 26.03 KG/M2 | DIASTOLIC BLOOD PRESSURE: 67 MMHG

## 2021-12-18 PROBLEM — Z98.890 S/P KNEE SURGERY: Status: ACTIVE | Noted: 2021-12-18

## 2021-12-18 LAB
HCT VFR BLD AUTO: 33.4 % (ref 37.5–51)
HGB BLD-MCNC: 10.8 G/DL (ref 13–17.7)

## 2021-12-18 PROCEDURE — G0378 HOSPITAL OBSERVATION PER HR: HCPCS

## 2021-12-18 PROCEDURE — 85018 HEMOGLOBIN: CPT | Performed by: NURSE PRACTITIONER

## 2021-12-18 PROCEDURE — 85014 HEMATOCRIT: CPT | Performed by: NURSE PRACTITIONER

## 2021-12-18 PROCEDURE — 97110 THERAPEUTIC EXERCISES: CPT

## 2021-12-18 RX ADMIN — MELOXICAM 15 MG: 15 TABLET ORAL at 11:34

## 2021-12-18 RX ADMIN — CARVEDILOL 3.12 MG: 3.12 TABLET, FILM COATED ORAL at 11:33

## 2021-12-18 RX ADMIN — POLYETHYLENE GLYCOL 3350 17 G: 17 POWDER, FOR SOLUTION ORAL at 11:34

## 2021-12-18 RX ADMIN — MUPIROCIN: 20 OINTMENT TOPICAL at 11:34

## 2021-12-18 RX ADMIN — HYDROCODONE BITARTRATE AND ACETAMINOPHEN 1 TABLET: 7.5; 325 TABLET ORAL at 11:33

## 2021-12-18 RX ADMIN — HYDROCODONE BITARTRATE AND ACETAMINOPHEN 1 TABLET: 7.5; 325 TABLET ORAL at 06:03

## 2021-12-18 RX ADMIN — ASPIRIN 81 MG: 81 TABLET, COATED ORAL at 11:33

## 2021-12-18 RX ADMIN — HYDROCODONE BITARTRATE AND ACETAMINOPHEN 1 TABLET: 7.5; 325 TABLET ORAL at 00:02

## 2021-12-18 NOTE — PLAN OF CARE
Goal Outcome Evaluation:PT POd1 Left total knee  with LISA, VSS, afebrile, pain controlled with po pain medication, worked with therapy today, DC instructions reviewed with pt and spouse, all questions answered, pt educated on monitoring BP for HTN, pt taken via WC with all belongings to DC exit.

## 2021-12-18 NOTE — PLAN OF CARE
"Goal Outcome Evaluation:              Outcome Summary: 75 y/o s/POD 0 L TKA. Pt up w/ assist x1 to toilet. Pt voiding well. Phil dsg c/d/i and flashing \"OK.\" Hemovac drain in place. Reenforced site d/t leaking. Neuro checks WNL, no c/o numbness/tingling. No c/o pain this shift, block intact. VSS. Educated on falls prevention. Plan to D/C home tomorrow. WIll CTM.  "

## 2021-12-18 NOTE — PLAN OF CARE
Goal Outcome Evaluation:              Outcome Summary: VSS overnight on RA, cesar dressing c/d/i, green light flashing.  Hemovac drain removed this AM.  Pain well controlled overnight on PO pain medication.  Pt was able to ambulate mulitiple times throughout the night. D/c planning for later today

## 2021-12-18 NOTE — PLAN OF CARE
Goal Outcome Evaluation:  Plan of Care Reviewed With: patient     Pt walked 150'CGA and climbed 10 stairs. He anticipates d/c this afternoon.

## 2021-12-18 NOTE — THERAPY TREATMENT NOTE
Patient Name: Chuck Zheng  : 1945    MRN: [id number]                              Today's Date: 2021       Admit Date: 2021    Visit Dx:     ICD-10-CM ICD-9-CM   1. S/P knee surgery  Z98.890 V45.89   2. Primary osteoarthritis of left knee  M17.12 715.16     Patient Active Problem List   Diagnosis   • Atopic rhinitis   • Benign essential hypertension   • Atherosclerosis of coronary artery   • Impaired fasting blood sugar   • Pure hypercholesterolemia   • Cobalamin deficiency   • Primary insomnia   • Health care maintenance   • Elevated PSA, less than 10 ng/ml   • PLACIDO on CPAP Check Pressure +11.5   • History of myocardial infarction   • Benign prostatic hyperplasia with incomplete bladder emptying   • Fatty liver   • Left anterior shoulder pain   • Primary osteoarthritis of left knee     Past Medical History:   Diagnosis Date   • Adhesive capsulitis of shoulder    • Arthritis    • Benign non-nodular prostatic hyperplasia 2016   • Benign prostatic hypertrophy    • Coronary artery disease    • Functional diarrhea 2017    Resolved with gluten free diet   • H/O cardiovascular stress test      normal   • Hemorrhoid    • History of COVID-19 2020   • Hyperlipidemia    • Hypertension    • Myocardial infarction (HCC)      2007, distal RCA 80% stenosis, s/p PTCA   • Nephrolithiasis     left kidney   • Obstructive sleep apnea     BIPAP   • Primary insomnia 2016   • Rupture of flexor tendon of hand    • Syncope     2004 neg w/u     Past Surgical History:   Procedure Laterality Date   • CHOLECYSTECTOMY     • COLONOSCOPY      2003, nl, , 2014 , nl, needs C-scope in    • CORONARY STENT PLACEMENT      2007 distal RCA   • EYE SURGERY      JOEY CATARACTS W IOL   • HAND SURGERY Right     THUMB SX   • KNEE ARTHROSCOPY Right        • KNEE ARTHROSCOPY Left 1/15/2019    Procedure: LEFT KNEE ARTHROSCOPY,  PARTIAL MEDIAL MENISECTOMY;  Surgeon: Mason Ochoa  MD Rufus;  Location[place] [facility] [place];  Service: Orthopedics   • TONSILLECTOMY        General Information     Row Name 12/18/21 0926          Physical Therapy Time and Intention    Document Type evaluation  -CS     Mode of Treatment individual therapy; physical therapy  -CS     Row Name 12/18/21 0926          General Information    Patient Profile Reviewed yes  -CS     Existing Precautions/Restrictions no known precautions/restrictions  -CS     Row Name 12/18/21 0926          Cognition    Orientation Status (Cognition) oriented x 4  -CS     Row Name 12/18/21 0926          Safety Issues, Functional Mobility    Impairments Affecting Function (Mobility) balance; endurance/activity tolerance; strength; pain; range of motion (ROM)  -CS           User Key  (r) = Recorded By, (t) = Taken By, (c) = Cosigned By    Initials Name Provider Type    CS Naga Schulte, PT Physical Therapist               Mobility     Row Name 12/18/21 0926          Bed Mobility    Bed Mobility supine-sit; sit-supine  -CS     Supine-Sit [place] (Bed Mobility) supervision; verbal cues  -CS     Assistive Device (Bed Mobility) bed rails; head of bed elevated  -CS     Row Name 12/18/21 0926          Sit-Stand Transfer    Sit-Stand [place] (Transfers) supervision; verbal cues  -CS     Assistive Device (Sit-Stand Transfers) walker, front-wheeled  -CS     Row Name 12/18/21 0926          Gait/Stairs (Locomotion)    [place] Level (Gait) contact guard; verbal cues; nonverbal cues (demo/gesture); 1 person assist  -CS     Assistive Device (Gait) walker, front-wheeled  -CS     Distance in Feet (Gait) 150  -CS     Deviations/Abnormal Patterns (Gait) antalgic; gait speed decreased; maya decreased; stride length decreased; weight shifting decreased  -CS     Bilateral Gait Deviations forward flexed posture; heel strike decreased  -CS     Number of Steps (Stairs) 10  -CS     Ascending Technique (Stairs) step-to-step  -CS     Descending Technique  (Stairs) step-to-step  -CS     Row Name 12/18/21 0926          Mobility    Extremity Weight-bearing Status left lower extremity  -CS     Left Lower Extremity (Weight-bearing Status) weight-bearing as tolerated (WBAT)  -CS           User Key  (r) = Recorded By, (t) = Taken By, (c) = Cosigned By    Initials Name Provider Type    Naga Shrestha, PT Physical Therapist               Obj/Interventions     Row Name 12/18/21 0927          Motor Skills    Therapeutic Exercise --  TKA protocol x10  -CS           User Key  (r) = Recorded By, (t) = Taken By, (c) = Cosigned By    Initials Name Provider Type    Naga Shrestha, PT Physical Therapist               Goals/Plan    No documentation.                Clinical Impression     Row Name 12/18/21 0927          Pain Scale: Numbers Pre/Post-Treatment    Pretreatment Pain Rating 0/10 - no pain  -CS     Posttreatment Pain Rating 2/10  -CS     Pain Intervention(s) Ambulation/increased activity; Repositioned  -CS     Row Name 12/18/21 0927          Plan of Care Review    Plan of Care Reviewed With patient  -CS           User Key  (r) = Recorded By, (t) = Taken By, (c) = Cosigned By    Initials Name Provider Type    Naga Shrestha, PT Physical Therapist               Outcome Measures     Row Name 12/18/21 0927          How much help from another person do you currently need...    Turning from your back to your side while in flat bed without using bedrails? 3  -CS     Moving from lying on back to sitting on the side of a flat bed without bedrails? 3  -CS     Moving to and from a bed to a chair (including a wheelchair)? 4  -CS     Standing up from a chair using your arms (e.g., wheelchair, bedside chair)? 4  -CS     Climbing 3-5 steps with a railing? 3  -CS     To walk in hospital room? 3  -CS     AM-PAC 6 Clicks Score (PT) 20  -CS     Row Name 12/18/21 0927          Functional Assessment    Outcome Measure Options AM-PAC 6 Clicks Basic Mobility (PT)  -CS           User  Key  (r) = Recorded By, (t) = Taken By, (c) = Cosigned By    Initials Name Provider Type    Naga Shrestha, PT Physical Therapist                             Physical Therapy Education                 Title: PT OT SLP Therapies (Done)     Topic: Physical Therapy (Done)     Point: Mobility training (Done)     Learning Progress Summary           Patient Acceptance, E,TB, VU,NR by  at 12/18/2021 0928    Acceptance, E,D,TB, VU,NR by  at 12/17/2021 1249                   Point: Home exercise program (Done)     Learning Progress Summary           Patient Acceptance, E,TB, VU,NR by  at 12/18/2021 0928    Acceptance, E,D,TB, VU,NR by  at 12/17/2021 1249                   Point: Body mechanics (Done)     Learning Progress Summary           Patient Acceptance, E,TB, VU,NR by  at 12/18/2021 0928    Acceptance, E,D,TB, VU,NR by  at 12/17/2021 1249                   Point: Precautions (Done)     Learning Progress Summary           Patient Acceptance, E,TB, VU,NR by  at 12/18/2021 0928    Acceptance, E,D,TB, VU,NR by  at 12/17/2021 1249                               User Key     Initials Effective Dates Name Provider Type Discipline     06/16/21 -  Naga Schulte PT Physical Therapist PT     05/10/21 -  Lou Onofre Physical Therapist PT              PT Recommendation and Plan     Plan of Care Reviewed With: patient     Time Calculation:    PT Charges     Row Name 12/18/21 0929             Time Calculation    Start Time 0910  -      Stop Time 0929  -      Time Calculation (min) 19 min  -      PT Received On 12/18/21  -      PT - Next Appointment 12/19/21  -            User Key  (r) = Recorded By, (t) = Taken By, (c) = Cosigned By    Initials Name Provider Type    Naga Shrestha, PT Physical Therapist              Therapy Charges for Today     Code Description Service Date Service Provider Modifiers Qty    [id number] HC PT THER PROC EA 15 MIN 12/18/2021 Naga Schulte, PT GP 1           PT G-Codes  Outcome Measure Options: AM-PAC 6 Clicks Basic Mobility (PT)  AM-PAC 6 Clicks Score (PT): 20    Naga Schulte, PT  12/18/2021

## 2021-12-18 NOTE — PROGRESS NOTES
[facility] PT will follow post hospital as ordered. Patient agreeable to services. Contact information confirmed.

## 2021-12-18 NOTE — DISCHARGE SUMMARY
Patient Name: Chuck Zheng  Patient YOB: 1945    Date of Admission:  12/17/2021  Date of Discharge:  12/18/2021  Discharge Diagnosis: AL TOTAL KNEE ARTHROPLASTY [91864] (TOTAL KNEE ARTHROPLASTY)  Presenting Problem/History of Present Illness: Primary osteoarthritis of left knee [M17.12]  S/P knee surgery [Z98.890]  Admitting Physician: Dr Neal Goodwin  Consults:   Consults     No orders found for last 30 day(s).          DETAILS OF HOSPITAL STAY:  Patient is a 76 y.o. male was admitted to the floor following the above procedure and underwent an uncomplicated hospital stay.  Patient did well with physical therapy and was ambulating well without problems.  On the day of discharge the wound was clean, dry and intact and calf was soft and non tender and Homans sign was negative.  Patient was tolerating   Diet Instructions     Advance Diet as Tolerated      May advance diet as tolerated while in hospital.    Diet:      Diet Texture / Consistency: Regular       without problems.  Patient will be discharged home.    Condition on Discharge:  Stable    Vital Signs  Temp:  [97 °F (36.1 °C)-97.8 °F (36.6 °C)] 97.5 °F (36.4 °C)  Heart Rate:  [72-87] 79  Resp:  [16-20] 20  BP: (100-160)/(60-84) 100/60    LABS:      Admission on 12/17/2021   Component Date Value Ref Range Status   • Hemoglobin 12/18/2021 10.8* 13.0 - 17.7 g/dL Final   • Hematocrit 12/18/2021 33.4* 37.5 - 51.0 % Final       No results found.    Discharge Medications     Discharge Medications      New Medications      Instructions Start Date   HYDROcodone-acetaminophen 7.5-325 MG per tablet  Commonly known as: NORCO   take 1-2 tablets by mouth every 4-6 hours as needed pain      ondansetron 4 MG tablet  Commonly known as: Zofran   4 mg, Oral, Every 8 Hours PRN      pantoprazole 40 MG EC tablet  Commonly known as: PROTONIX   40 mg, Oral, Daily      polyethylene glycol 17 GM/SCOOP powder  Commonly known as: MIRALAX   mix 1 capful (17 g) in liquid  by mouth 2 (Two) Times a Day for 7 days         Changes to Medications      Instructions Start Date   aspirin 81 MG EC tablet  What changed:   · when to take this  · additional instructions   81 mg, Oral, 2 Times Daily, Start AM the next day following surgery      meloxicam 15 MG tablet  Commonly known as: MOBIC  What changed: additional instructions   15 mg, Oral, Daily      rosuvastatin 40 MG tablet  Commonly known as: CRESTOR  What changed: how much to take   40 mg, Oral, Every Morning         Continue These Medications      Instructions Start Date   amitriptyline 25 MG tablet  Commonly known as: ELAVIL   25 mg, Oral, Nightly      carvedilol 3.125 MG tablet  Commonly known as: COREG   TAKE 1 TABLET BY MOUTH TWICE DAILY WITH MEALS      finasteride 5 MG tablet  Commonly known as: PROSCAR   No dose, route, or frequency recorded.      Fish Oil Double Strength 1200 MG capsule   Oral, STOPPED 1/2/19  HOLD X 1 WEEK      Multi Vitamin Daily tablet tablet  Generic drug: multivitamin   Oral, HOLD X 1 WEEK      NON FORMULARY   1 tablet, Oral, Daily, CBD GUMMI 500MG  HOLD X 1 WEEK      tamsulosin 0.4 MG capsule 24 hr capsule  Commonly known as: FLOMAX   No dose, route, or frequency recorded.      zolpidem 10 MG tablet  Commonly known as: AMBIEN   10 mg, Oral, Nightly PRN      ZyrTEC Allergy 10 MG capsule  Generic drug: Cetirizine HCl   10 mg, Oral, Every Morning         Stop These Medications    cholecalciferol 25 MCG (1000 UT) tablet  Commonly known as: VITAMIN D3     Red Yeast Rice 600 MG tablet     Vitamin B 12 100 MCG lozenge     vitamin b complex capsule capsule            Discharge Instructions: Patient is to continue with physical therapy exercises daily and continue working with the physical therapist as ordered. Patient may weight bear as tolerated. Apply ice regularly. Patient may ice for long periods of time as long as ice is not directly on the skin. Patient instructed on frequent calf pumping exercises.  Patient  also instructed on incentive spirometer during hospitalization and encouraged to continue to use at home regularly.    Dressing: The dressing is designed to be left in place until you return to the office in 2 weeks.  The suction unit should stop functioning at 7 days and the green light will switch to yellow.  At that point the suction unit and tubing can be disconnected at the port closest to the dressing.  The suction unit and tubing may be discarded.  You may shower immediately upon return home, you will need to turn the pump off by depressing the orange button once and then you may disconnect the pump and tubing at the connection port.  After showering, shake off the excess water and reattach the tubing.  Restart the pump by depressing the orange button one time and you will notice the green light flashing again.  If the dressing becomes disloged or saturated it should be changed. Please refer to the LISA information sheet if you have any questions about the dressing.  If you have a home health nurse or therapist they can be contacted to assist with dressing change or repair. You may also call the LISA dressing hotline for questions related to the dressing ([phone]). If there still other problems or questions related to the dressing despite these measures then you can contact Latanya at our office [phone].  If for some reason the LISA dressing is removed, after 7 days the wound can be gently cleaned with antibacterial soap then allowed to dry and covered with a dry sterile dressing. The wound should be covered at all times except while showering.  Patient may change dressings daily and prn using sterile 4x4 and paper tape, and should call if any unusual drainage, redness or swelling.*  Follow up appointment in 2 weeks - patient to call the office at [phone] to schedule.  Patient will be discharged on aspirin 81mg BID x weeks, then daily x 4weeks    Discharge Diagnosis:    Patient Active Problem List    Diagnosis   • Atopic rhinitis   • Benign essential hypertension   • Atherosclerosis of coronary artery   • Impaired fasting blood sugar   • Pure hypercholesterolemia   • Cobalamin deficiency   • Primary insomnia   • Health care maintenance   • Elevated PSA, less than 10 ng/ml   • PLACIDO on CPAP Check Pressure +11.5   • History of myocardial infarction   • Benign prostatic hyperplasia with incomplete bladder emptying   • Fatty liver   • Left anterior shoulder pain   • Primary osteoarthritis of left knee   • S/P knee surgery       Follow-up Appointments  Future Appointments   Date Time Provider Department Center   1/4/2022 10:50 AM Neal Goodwin MD [facility] [address] [facility]          Neal Goodwin MD  12/18/21  10:01 EST

## 2021-12-19 ENCOUNTER — HOME CARE VISIT (OUTPATIENT)
Dept: HOME HEALTH SERVICES | Facility: HOME HEALTHCARE | Age: 76
End: 2021-12-19

## 2021-12-19 VITALS
OXYGEN SATURATION: 96 % | HEART RATE: 84 BPM | DIASTOLIC BLOOD PRESSURE: 72 MMHG | RESPIRATION RATE: 18 BRPM | SYSTOLIC BLOOD PRESSURE: 120 MMHG | TEMPERATURE: 98.6 F

## 2021-12-19 PROCEDURE — G0151 HHCP-SERV OF PT,EA 15 MIN: HCPCS

## 2021-12-19 NOTE — HOME HEALTH
Physical Therapy Evaluation   Diagnosis: left TKA  Surgical Procedure and date: left TKA 12/17/2021  Pertinent Medical History: see epic    Prior level of function:   Ambulation: independent   Activities of daily living:  independent   Instrumental activities of daily living: independent   Driving: drives   Other/ Hobbies/Work:  works as , likes to hike  Edema:   moderate left knee  Home/ social environment:  lives with wife in home with steps to enter/ exit and steps within home to second floor bedroom and bathroom  Goal for Therapy: hike in [facility]  Plan for next visit: review exercises

## 2021-12-19 NOTE — CASE COMMUNICATION
Patient is left TKA of Dr. Goodwin.  3w1, 2w1, 1w1.  Doing well.  Has LISA dressing.  Walking with walker.  10 reps all exercises.  Will discharge to out patient PT.  Was fully independent prior to admission.

## 2021-12-20 ENCOUNTER — HOME CARE VISIT (OUTPATIENT)
Dept: HOME HEALTH SERVICES | Facility: HOME HEALTHCARE | Age: 76
End: 2021-12-20

## 2021-12-20 VITALS
TEMPERATURE: 98.6 F | OXYGEN SATURATION: 99 % | RESPIRATION RATE: 16 BRPM | HEART RATE: 66 BPM | SYSTOLIC BLOOD PRESSURE: 140 MMHG | DIASTOLIC BLOOD PRESSURE: 78 MMHG

## 2021-12-20 PROCEDURE — G0151 HHCP-SERV OF PT,EA 15 MIN: HCPCS

## 2021-12-20 NOTE — CASE MANAGEMENT/SOCIAL WORK
Case Management Discharge Note      Final Note: Home with [facility].    Provided Post Acute Provider List?: Yes  Post Acute Provider List: Home Health  Delivered To: Patient  Method of Delivery: Telephone    Selected Continued Care - Discharged on 12/18/2021 Admission date: 12/17/2021 - Discharge disposition: [place] [facility] [place]    Destination    No services have been selected for the patient.              Durable Medical Equipment    No services have been selected for the patient.              Dialysis/Infusion    No services have been selected for the patient.              Home Medical Care Coordination complete.    Service Provider Selected Services Address Phone Fax Patient Preferred    [facility]  Home Health Services [address] [address] [phone] --          Therapy    No services have been selected for the patient.              Community Resources    No services have been selected for the patient.              Community & DME    No services have been selected for the patient.                       Final Discharge Disposition Code: 06 - home with home health care

## 2021-12-20 NOTE — HOME HEALTH
Plan for next visit:  add standing exercise    Subjective:  I can't find the book they gave me.    Falls since last visit: none  Home/Social Environment: lives with wife in home with seps to second floor and steps entering and exitting home

## 2021-12-21 ENCOUNTER — OFFICE VISIT (OUTPATIENT)
Dept: ORTHOPEDIC SURGERY | Facility: CLINIC | Age: 76
End: 2021-12-21

## 2021-12-21 ENCOUNTER — NURSE TRIAGE (OUTPATIENT)
Dept: CALL CENTER | Facility: HOSPITAL | Age: 76
End: 2021-12-21

## 2021-12-21 ENCOUNTER — TELEPHONE (OUTPATIENT)
Dept: ORTHOPEDIC SURGERY | Facility: CLINIC | Age: 76
End: 2021-12-21

## 2021-12-21 VITALS — BODY MASS INDEX: 27.74 KG/M2 | RESPIRATION RATE: 20 BRPM | TEMPERATURE: 97.4 F | HEIGHT: 68 IN | WEIGHT: 183 LBS

## 2021-12-21 DIAGNOSIS — Z96.652 S/P TKR (TOTAL KNEE REPLACEMENT), LEFT: Primary | ICD-10-CM

## 2021-12-21 PROCEDURE — 99024 POSTOP FOLLOW-UP VISIT: CPT | Performed by: NURSE PRACTITIONER

## 2021-12-21 NOTE — TELEPHONE ENCOUNTER
"    Reason for Disposition  • Dressing soaked with blood or body fluid (e.g., drainage)    Additional Information  • Negative: [1] Major abdominal surgical incision AND [2] wound gaping open AND [3] visible internal organs  • Negative: Sounds like a life-threatening emergency to the triager  • Negative: Patient has a Negative Pressure Wound Therapy device  • Negative: Patient is followed by a wound clinic or wound specialist for this wound  • Negative: [1] Bleeding from incision AND [2] won't stop after 10 minutes of direct pressure  • Negative: [1] Widespread rash AND [2] bright red, sunburn-like  • Negative: Severe pain in the incision  • Negative: [1] Incision gaping open AND [2] < 48 hours since wound re-opened  • Negative: [1] Incision gaping open AND [2] length of opening > 2 inches (5 cm)  • Negative: Patient sounds very sick or weak to the triager  • Negative: Sounds like a serious complication to the triager  • Negative: Fever > 100.4 F (38.0 C)  • Negative: [1] Incision looks infected (spreading redness, pain) AND [2] fever > 99.5 F (37.5 C)  • Negative: [1] Incision looks infected (spreading redness, pain) AND [2] large red area (> 2 in. or 5 cm)  • Negative: [1] Incision looks infected (spreading redness, pain) AND [2] face wound  • Negative: [1] Red streak runs from the incision AND [2] longer than 1 inch (2.5 cm)  • Negative: [1] Pus or bad-smelling fluid draining from incision AND [2] no fever  • Negative: [1] Post-op pain AND [2] not controlled with pain medications    Answer Assessment - Initial Assessment Questions  1. SYMPTOM: \"What's the main symptom you're concerned about?\" (e.g., redness, pain, drainage)      drainage  2. ONSET: \"When did drainage  start?\"      yesterday  3. SURGERY: \"What surgery was performed?\"      Knee replacement  4. DATE of SURGERY: \"When was surgery performed?\"       Friday 12/17  5. INCISION SITE: \"Where is the incision located?\"       Left knee  6. REDNESS: \"Is there " "any redness at the incision site?\" If yes, ask: \"How wide across is the redness?\" (Inches, centimeters)       Unable to determine  7. PAIN: \"Is there any pain?\" If Yes, ask: \"How bad is it?\"  (Scale 1-10; or mild, moderate, severe)      Describes as stiffness; no new increase of pain  8. BLEEDING: \"Is there any bleeding?\" If Yes, ask: \"How much?\" and \"Where?\"      Denies; then decided it is thick blood, not just fluid draining  9. DRAINAGE: \"Is there any drainage from the incision site?\" If yes, ask: \"What color and how much?\" (e.g., red, cloudy, pus; drops, teaspoon)      red  10. FEVER: \"Do you have a fever?\" If Yes, ask: \"What is your temperature, how was it measured, and when did it start?\"        Denies fever, but, states chills  11. OTHER SYMPTOMS: \"Do you have any other symptoms?\" (e.g., shaking chills, weakness, rash elsewhere on body)        na    Protocols used: POST-OP INCISION SYMPTOMS AND QUESTIONS-ADULT-      "

## 2021-12-21 NOTE — TELEPHONE ENCOUNTER
DELETE AFTER REVIEWING: Telephone encounter to be sent to the clinical pool   Hub staff attempted to follow warm transfer process and was unsuccessful     Caller: ASHELY OTERO    Relationship to patient: SELF    Best call back number:     Patient is needing: WASN;T ABLE TO WARM TRANSFER - patient had sx last week and late Fri or Saturday - the port where the fluid drains out - there is a lot of blood around - not leaking - but would like a call back from clinical - wants to know if needs to come in to have bandage replaced>  Please send him text with what to do - UNABLE TO ANSWER HIS PHONE

## 2021-12-22 ENCOUNTER — HOME CARE VISIT (OUTPATIENT)
Dept: HOME HEALTH SERVICES | Facility: HOME HEALTHCARE | Age: 76
End: 2021-12-22

## 2021-12-22 VITALS
TEMPERATURE: 97.5 F | OXYGEN SATURATION: 98 % | SYSTOLIC BLOOD PRESSURE: 136 MMHG | RESPIRATION RATE: 16 BRPM | DIASTOLIC BLOOD PRESSURE: 76 MMHG | HEART RATE: 94 BPM

## 2021-12-22 PROCEDURE — G0151 HHCP-SERV OF PT,EA 15 MIN: HCPCS

## 2021-12-27 ENCOUNTER — HOME CARE VISIT (OUTPATIENT)
Dept: HOME HEALTH SERVICES | Facility: HOME HEALTHCARE | Age: 76
End: 2021-12-27

## 2021-12-27 ENCOUNTER — TELEPHONE (OUTPATIENT)
Dept: ORTHOPEDIC SURGERY | Facility: HOSPITAL | Age: 76
End: 2021-12-27

## 2021-12-27 VITALS
TEMPERATURE: 98.4 F | HEART RATE: 89 BPM | RESPIRATION RATE: 16 BRPM | OXYGEN SATURATION: 98 % | SYSTOLIC BLOOD PRESSURE: 126 MMHG | DIASTOLIC BLOOD PRESSURE: 78 MMHG

## 2021-12-27 PROCEDURE — G0151 HHCP-SERV OF PT,EA 15 MIN: HCPCS

## 2021-12-27 NOTE — HOME HEALTH
Plan for next visit: review standing exercise    Subjective:  I stopped taking the pain medication Saturday.  I'm taking tylenol.    Falls since last visit: none  Home/Social Environment: lives with wife in home with steps to second floor and steps entering and exiting home

## 2021-12-27 NOTE — TELEPHONE ENCOUNTER
Attempted to reach Mr. Zheng to see how he was doing as he is SP TKA. Message left. Awaiting call back.

## 2021-12-29 ENCOUNTER — HOME CARE VISIT (OUTPATIENT)
Dept: HOME HEALTH SERVICES | Facility: HOME HEALTHCARE | Age: 76
End: 2021-12-29

## 2021-12-29 VITALS
OXYGEN SATURATION: 98 % | TEMPERATURE: 97.7 F | RESPIRATION RATE: 18 BRPM | HEART RATE: 70 BPM | DIASTOLIC BLOOD PRESSURE: 78 MMHG | SYSTOLIC BLOOD PRESSURE: 132 MMHG

## 2021-12-29 PROCEDURE — G0151 HHCP-SERV OF PT,EA 15 MIN: HCPCS

## 2021-12-29 NOTE — HOME HEALTH
Plan for next visit: review everything and discharge to out patient therapy    Subjective:  I'm doing well except my cpap is keeping me from sleeping.    Falls since last visit: none  Home/Social Environment: lives with wife in home with steps to second floor and steps entering and exiting home

## 2022-01-03 ENCOUNTER — HOME CARE VISIT (OUTPATIENT)
Dept: HOME HEALTH SERVICES | Facility: HOME HEALTHCARE | Age: 77
End: 2022-01-03

## 2022-01-03 VITALS
RESPIRATION RATE: 18 BRPM | SYSTOLIC BLOOD PRESSURE: 132 MMHG | DIASTOLIC BLOOD PRESSURE: 78 MMHG | OXYGEN SATURATION: 98 % | HEART RATE: 59 BPM

## 2022-01-03 PROCEDURE — G0151 HHCP-SERV OF PT,EA 15 MIN: HCPCS

## 2022-01-03 NOTE — HOME HEALTH
Subjective:  My biggest problem is that I can't sleep.  It's not pain though it's my cpap.    Falls since last visit: none  Home/Social Environment: lives with wife in home with steps to second floor and steps entering and exiting home

## 2022-01-04 ENCOUNTER — OFFICE VISIT (OUTPATIENT)
Dept: ORTHOPEDIC SURGERY | Facility: CLINIC | Age: 77
End: 2022-01-04

## 2022-01-04 VITALS — WEIGHT: 166 LBS | HEIGHT: 68 IN | RESPIRATION RATE: 18 BRPM | TEMPERATURE: 97 F | BODY MASS INDEX: 25.16 KG/M2

## 2022-01-04 DIAGNOSIS — Z96.652 S/P TKR (TOTAL KNEE REPLACEMENT), LEFT: Primary | ICD-10-CM

## 2022-01-04 PROCEDURE — 99024 POSTOP FOLLOW-UP VISIT: CPT | Performed by: ORTHOPAEDIC SURGERY

## 2022-01-04 NOTE — PROGRESS NOTES
Chuck Zheng : 1945 MRN: [id number] DATE: 2022    DIAGNOSIS: 2 week follow up left total knee      SUBJECTIVE:Patient returns today for 2 week follow up of left total knee replacement. Patient reports doing well with no unusual complaints. Appears to be progressing appropriately.    OBJECTIVE:   Exam:. The incision is healing appropriately. No sign of infection. Range of motion is progressing as expected. The calf is soft and nontender with a negative Homans sign.    ASSESSMENT: 2 week status post left knee replacement.    PLAN: 1) Fusion removed and steri strips applied   2) Order given for PT   3) Discontinue JESSEE hose   4) Continue ice PRN   5) aspirin 81 mg orally every day for 1 month   6) Follow up in 6 weeks with repeat Xrays of left knee (3views)    Kory Lobo, MAXINE  2022     This patient was seen in conjunction today with Dr. Neal Goodwin.  Dr. Goodwin agrees with the above-stated assessment and plan.

## 2022-01-06 ENCOUNTER — HOSPITAL ENCOUNTER (OUTPATIENT)
Dept: PHYSICAL THERAPY | Facility: HOSPITAL | Age: 77
Setting detail: THERAPIES SERIES
Discharge: HOME | End: 2022-01-06

## 2022-01-06 DIAGNOSIS — M25.562 ACUTE PAIN OF LEFT KNEE: Primary | ICD-10-CM

## 2022-01-06 DIAGNOSIS — R26.2 DIFFICULTY WALKING: ICD-10-CM

## 2022-01-06 PROCEDURE — 97110 THERAPEUTIC EXERCISES: CPT

## 2022-01-06 PROCEDURE — 97161 PT EVAL LOW COMPLEX 20 MIN: CPT

## 2022-01-06 NOTE — THERAPY EVALUATION
Outpatient Physical Therapy Ortho Initial Evaluation  [facility]     Patient Name: Chuck Zheng  : 1945  MRN: [id number]  Today's Date: 2022      Visit Date: 2022    Patient Active Problem List   Diagnosis   • Atopic rhinitis   • Benign essential hypertension   • Atherosclerosis of coronary artery   • Impaired fasting blood sugar   • Pure hypercholesterolemia   • Cobalamin deficiency   • Primary insomnia   • Health care maintenance   • Elevated PSA, less than 10 ng/ml   • PLACIDO on CPAP Check Pressure +11.5   • History of myocardial infarction   • Benign prostatic hyperplasia with incomplete bladder emptying   • Fatty liver   • Left anterior shoulder pain   • Primary osteoarthritis of left knee   • S/P knee surgery        Past Medical History:   Diagnosis Date   • Adhesive capsulitis of shoulder    • Arthritis    • Benign non-nodular prostatic hyperplasia 2016   • Benign prostatic hypertrophy    • Coronary artery disease    • Functional diarrhea 2017    Resolved with gluten free diet   • H/O cardiovascular stress test      normal   • Hemorrhoid    • History of COVID-19 2020   • Hyperlipidemia    • Hypertension    • Myocardial infarction (HCC)      2007, distal RCA 80% stenosis, s/p PTCA   • Nephrolithiasis     left kidney   • Obstructive sleep apnea     BIPAP   • Primary insomnia 2016   • Rupture of flexor tendon of hand    • Syncope     2004 neg w/u        Past Surgical History:   Procedure Laterality Date   • CHOLECYSTECTOMY     • COLONOSCOPY      2003, nl, , 2014 , nl, needs C-scope in    • CORONARY STENT PLACEMENT      2007 distal RCA   • EYE SURGERY      JOEY CATARACTS W IOL   • HAND SURGERY Right     THUMB SX   • KNEE ARTHROSCOPY Right        • KNEE ARTHROSCOPY Left 1/15/2019    Procedure: LEFT KNEE ARTHROSCOPY,  PARTIAL MEDIAL MENISECTOMY;  Surgeon: Mason Ochoa MD;  Location: [facility];  Service: Orthopedics    • TONSILLECTOMY     • TOTAL KNEE ARTHROPLASTY Left 12/17/2021    Procedure: TOTAL KNEE ARTHROPLASTY;  Surgeon: Neal Goodwin MD;  Location: [facility];  Service: Orthopedics;  Laterality: Left;       Visit Dx:     ICD-10-CM ICD-9-CM   1. Acute pain of left knee  M25.562 719.46   2. Difficulty walking  R26.2 719.7          Patient History     Row Name 01/06/22 1000             History    Brief Description of Current Complaint Pt underwent L TKA 12/17/21, had HHPT which ended Monday. Pt is no longer using AD and able to ascend/descend stairs with step to pattern at times and other reciprocally. Off of pain meds at this time and very happy with progress to date.  -CN      Previous treatment for THIS PROBLEM Surgery  -CN      Surgery Date: 12/17/21  -CN      Patient/Caregiver Goals Relieve pain; Return to prior level of function; Improve mobility; Improve strength; Know what to do to help the symptoms  -CN      Patient seeing anyone else for problem(s)? Yes  -CN              Pain     Pain Location Knee  -CN      Pain at Present 0  -CN      Pain at Best 0  -CN      Pain at Worst 3; 4  -CN      Pain Frequency Intermittent  -CN      Pain Description Aching  -CN      What Performance Factors Make the Current Problem(s) WORSE? Initiating walking, stairs, exercises  -CN      What Performance Factors Make the Current Problem(s) BETTER? Icing, elevating, Tylenol  -CN      Is your sleep disturbed? 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 Yes, stairs, donning socks/shoes  -CN      Difficulties with recreational activities? Yes, bowling, hiking  -CN              Fall Risk Assessment    Any falls in the past year: Yes  -CN      Number of falls reported in the last 12 months 1  -CN      Factors that contributed to the fall: Uneven surface  -CN              Daily Activities    Primary Language English  -CN      How does patient learn best?  Listening; Reading; Demonstration  -CN      Barriers to learning None  -CN      Pt Participated in POC and Goals Yes  -CN              Safety    Are you being hurt, hit, or frightened by anyone at home or in your life? No  -CN      Are you being neglected by a caregiver No  -CN            User Key  (r) = Recorded By, (t) = Taken By, (c) = Cosigned By    Initials Name Provider Type    Laney Welch, KODY Physical Therapist                 PT Ortho     Row Name 01/06/22 1100       Posture/Observations    Observations Incision healing; Edema  -CN    Posture/Observations Comments dec TKE, R weight shift  -CN       Myotomal Screen- Lower Quarter Clearing    Hip flexion (L2) Right:; 5 (Normal); Left:; 4+ (Good +)  -CN    Knee extension (L3) Right:; 5 (Normal); Left:; 4- (Good -)  with pain  -CN    Ankle DF (L4) Right:; 5 (Normal); Left:; 4+ (Good +)  -CN    Ankle PF (S1) Right:; 5 (Normal); Left:; 4 (Good)  -CN    Knee flexion (S2) Right:; 5 (Normal); Left:; 4- (Good -)  -CN       General ROM    RT Lower Ext Rt Knee Extension/Flexion  -CN    LT Lower Ext Lt Knee Extension/Flexion  -CN       Right Lower Ext    Rt Knee Extension/Flexion AROM 0-123  -CN       Left Lower Ext    Lt Knee Extension/Flexion AROM 0-4-90 (90 deg after stretching)  -CN       Lower Extremity Flexibility    Hamstrings Mildly limited; Moderately limited  -CN    Quadriceps Mildly limited; Moderately limited  -CN       Gait/Stairs (Locomotion)    Comment (Gait/Stairs) No AD, antalgia, dec TKE, dec knee flex with swing through, dec time spent on L LE, dec hip ext  -CN          User Key  (r) = Recorded By, (t) = Taken By, (c) = Cosigned By    Initials Name Provider Type    Laney Welch, KODY Physical Therapist                            Therapy Education  Education Details: Access Code [email or web address], anatomy, goals of PT, eval findings, importance of achieving knee flex ROM  Given: HEP, Symptoms/condition management, Pain  management  Program: New  How Provided: Verbal, Demonstration, Written  Provided to: Patient  Level of Understanding: Teach back education performed, Verbalized, Demonstrated      PT OP Goals     Row Name 01/06/22 1300          PT Short Term Goals    STG Date to Achieve 02/06/22  -CN     STG 1 Pt will report pain rated 2/10 at worst in order to demonstrate ability to return to normalized ADLs and functional activities.  -CN     STG 1 Progress New  -CN     STG 2 Pt will be independent with initial HEP for symptom management.  -CN     STG 2 Progress New  -CN     STG 3 Pt will demonstrate knee AROM flex to 100 deg in order to improve ability to perform ADLs.  -CN     STG 3 Progress New  -CN            Long Term Goals    LTG Date to Achieve 03/06/22  -CN     LTG 1 Pt will be independent and compliant with advanced HEP for long term management of symptoms and prevention of future occurrence.  -CN     LTG 1 Progress New  -CN     LTG 2 Pt will reduce level of perceived disability as measured by the KOS-ADL  to 90% in order to improve QOL.  -CN     LTG 2 Progress New  -CN     LTG 3 Pt will demonstrate knee AROM 0-115 in order to improve ability to ascend/descend stairs and perform gait tasks.  -CN     LTG 3 Progress New  -CN     LTG 4 Pt will demonstrate L knee flex/ext strength to 4+/5 in order to improve endurance with recreational activities.  -CN     LTG 4 Progress New  -CN     LTG 5 Pt will demonstrate proper gait pattern without AD in order to reduce stress on affected tissues.  -CN     LTG 5 Progress New  -CN            Time Calculation    PT Goal Re-Cert Due Date 02/06/22  -CN           User Key  (r) = Recorded By, (t) = Taken By, (c) = Cosigned By    Initials Name Provider Type    Laney Welch, PT Physical Therapist                 PT Assessment/Plan     Row Name 01/06/22 1345          PT Assessment    Functional Limitations Decreased safety during functional activities; Impaired gait; Impaired  locomotion; Limitation in home management; Limitations in community activities; Limitations in functional capacity and performance; Performance in leisure activities; Performance in self-care ADL; Performance in work activities  -CN     Impairments Balance; Edema; Gait; Endurance; Impaired flexibility; Muscle strength; Pain; Posture; Range of motion  -CN     Assessment Comments 76 y.o. male referred to outpatient physical therapy for evaluation and treatment of left knee pain s/p TKA on 12/17/21.  Patient presents with antalgic gait pattern, decreased ROM, most significantly into flex, dec knee flex/ext strength, impaired hip girdle flexibibility. Pt's signs and symptoms are consistent with referring diagnosis.  Pertinent comorbidities and personal factors that may affect progress include, but are not limited to, sedentary work.  Pt scored 71% on the KOS-ADL where 100% is no disability and is in stable clinical condition. Pt would benefit from skilled PT to address functional deficits and return to PLOF.  -CN     Please refer to paper survey for additional self-reported information Yes  -CN     Rehab Potential Good  -CN     Patient/caregiver participated in establishment of treatment plan and goals Yes  -CN     Patient would benefit from skilled therapy intervention Yes  -CN            PT Plan    PT Frequency 2x/week  -CN     Predicted Duration of Therapy Intervention (PT) 10-12 visits  -CN     Planned CPT's? PT EVAL LOW COMPLEXITY: 04901; PT RE-EVAL: 75044; PT THER PROC EA 15 MIN: 92393; PT THER ACT EA 15 MIN: 76445; PT MANUAL THERAPY EA 15 MIN: 00688; PT NEUROMUSC RE-EDUCATION EA 15 MIN: 39726; PT GAIT TRAINING EA 15 MIN: 70627; PT AQUATIC THERAPY EA 15 MIN: 56517; PT SELF CARE/HOME MGMT/TRAIN EA 15: 21383; PT HOT OR COLD PACK TREAT MCARE; PT ELECTRICAL STIM UNATTEND: ; PT ELECTRICAL STIM ATTD EA 15 MIN: 31808  -CN     PT Plan Comments Assess response to evaluation and continue with TKA protocol. May consider  manual intervention for improved flexion.  -CN           User Key  (r) = Recorded By, (t) = Taken By, (c) = Cosigned By    Initials Name Provider Type    Laney Welch PT Physical Therapist                   OP Exercises     Row Name 01/06/22 1300             Total Minutes    96221 - PT Therapeutic Exercise Minutes 10  -CN              Exercise 1    Exercise Name 1 QS  -CN      Cueing 1 Verbal; Demo; Tactile  -CN      Reps 1 10  -CN      Time 1 5 sec  -CN              Exercise 2    Exercise Name 2 Seated knee flex stretch with opp LE overpressure  -CN      Cueing 2 Verbal; Demo  -CN      Reps 2 10  -CN      Time 2 5 sec  -CN              Exercise 3    Exercise Name 3 Stair knee flex stretch  -CN      Cueing 3 Verbal; Demo  -CN      Reps 3 3  -CN      Time 3 20 sec  -CN            User Key  (r) = Recorded By, (t) = Taken By, (c) = Cosigned By    Initials Name Provider Type    Laney Welch PT Physical Therapist                              Outcome Measure Options: Knee Outcome Score- ADL  Knee Outcome Score  Knee Outcome Score Comments: 71% where 100% is no disability      Time Calculation:     Start Time: 1045  Stop Time: 1126  Time Calculation (min): 41 min  Timed Charges  34615 - PT Therapeutic Exercise Minutes: 10  Total Minutes  Timed Charges Total Minutes: 10   Total Minutes: 10     Therapy Charges for Today     Code Description Service Date Service Provider Modifiers Qty    [id number]  PT THER PROC EA 15 MIN 1/6/2022 Laney Russell, PT GP 1    [id number] HC PT EVAL LOW COMPLEXITY 2 1/6/2022 Laney Russell, PT GP 1          PT G-Codes  Outcome Measure Options: Knee Outcome Score- ADL         Laney Russell PT  1/6/2022

## 2022-01-07 PROCEDURE — G0180 MD CERTIFICATION HHA PATIENT: HCPCS | Performed by: ORTHOPAEDIC SURGERY

## 2022-01-10 ENCOUNTER — HOSPITAL ENCOUNTER (OUTPATIENT)
Dept: PHYSICAL THERAPY | Facility: HOSPITAL | Age: 77
Setting detail: THERAPIES SERIES
Discharge: HOME | End: 2022-01-10

## 2022-01-10 DIAGNOSIS — R26.2 DIFFICULTY WALKING: ICD-10-CM

## 2022-01-10 DIAGNOSIS — M25.562 ACUTE PAIN OF LEFT KNEE: Primary | ICD-10-CM

## 2022-01-10 PROCEDURE — 97110 THERAPEUTIC EXERCISES: CPT

## 2022-01-10 PROCEDURE — 97140 MANUAL THERAPY 1/> REGIONS: CPT

## 2022-01-10 NOTE — THERAPY TREATMENT NOTE
Outpatient Physical Therapy Ortho Treatment Note  [facility]     Patient Name: Chuck Zheng  : 1945  MRN: [id number]  Today's Date: 1/10/2022      Visit Date: 01/10/2022    Visit Dx:    ICD-10-CM ICD-9-CM   1. Acute pain of left knee  M25.562 719.46   2. Difficulty walking  R26.2 719.7       Patient Active Problem List   Diagnosis   • Atopic rhinitis   • Benign essential hypertension   • Atherosclerosis of coronary artery   • Impaired fasting blood sugar   • Pure hypercholesterolemia   • Cobalamin deficiency   • Primary insomnia   • Health care maintenance   • Elevated PSA, less than 10 ng/ml   • PLACIDO on CPAP Check Pressure +11.5   • History of myocardial infarction   • Benign prostatic hyperplasia with incomplete bladder emptying   • Fatty liver   • Left anterior shoulder pain   • Primary osteoarthritis of left knee   • S/P knee surgery        Past Medical History:   Diagnosis Date   • Adhesive capsulitis of shoulder    • Arthritis    • Benign non-nodular prostatic hyperplasia 2016   • Benign prostatic hypertrophy    • Coronary artery disease    • Functional diarrhea 2017    Resolved with gluten free diet   • H/O cardiovascular stress test      normal   • Hemorrhoid    • History of COVID-19 2020   • Hyperlipidemia    • Hypertension    • Myocardial infarction (HCC)      2007, distal RCA 80% stenosis, s/p PTCA   • Nephrolithiasis     left kidney   • Obstructive sleep apnea     BIPAP   • Primary insomnia 2016   • Rupture of flexor tendon of hand    • Syncope     2004 neg w/u        Past Surgical History:   Procedure Laterality Date   • CHOLECYSTECTOMY     • COLONOSCOPY      2003, nl, , 2014 , nl, needs C-scope in    • CORONARY STENT PLACEMENT      2007 distal RCA   • EYE SURGERY      JOEY CATARACTS W IOL   • HAND SURGERY Right     THUMB SX   • KNEE ARTHROSCOPY Right        • KNEE ARTHROSCOPY Left 1/15/2019    Procedure: LEFT KNEE  ARTHROSCOPY,  PARTIAL MEDIAL MENISECTOMY;  Surgeon: Mason Ochoa MD;  Location: [facility];  Service: Orthopedics   • TONSILLECTOMY     • TOTAL KNEE ARTHROPLASTY Left 12/17/2021    Procedure: TOTAL KNEE ARTHROPLASTY;  Surgeon: Neal Goodwin MD;  Location: [facility] OR;  Service: Orthopedics;  Laterality: Left;                        PT Assessment/Plan     Row Name 01/10/22 1211          PT Assessment    Assessment Comments Pt returns for initial follow up after evaluation reporting good tolerance to initial HEP. Pt with continued decreased ROM, most limited into flex. ROM measured to 95 deg today following manual with significant guarding noted by PT. Pt is no longer taking pain meds and discussed taking something over the counter prior to next visit for improved tolerance to manual work and pending soreness following today's session. Pt with quad lag during SLR and difficutly coordinating quad contraction with SAQ per weakness.  -CN            PT Plan    PT Plan Comments Assess response to manual and continue to progress ROM and strengthening as tolerated. May consider wall slides next visit.  -CN           User Key  (r) = Recorded By, (t) = Taken By, (c) = Cosigned By    Initials Name Provider Type    CN Laney Russell, PT Physical Therapist                   OP Exercises     Row Name 01/10/22 1100             Subjective Comments    Subjective Comments It is feeling pretty good. I am doing my exercises at home. I am walking better I think and working on stairs.  -CN              Subjective Pain    Able to rate subjective pain? yes  -CN      Pre-Treatment Pain Level 0  -CN              Total Minutes    21342 - PT Therapeutic Exercise Minutes 30  -CN      08785 - PT Manual Therapy Minutes 15  -CN              Exercise 1    Exercise Name 1 QS  -CN      Cueing 1 Verbal; Demo; Tactile  -CN      Reps 1 15  -CN      Time 1 5 sec  -CN              Exercise 3    Exercise Name 3 Stair knee flex stretch   -CN      Cueing 3 Verbal; Demo  -CN      Reps 3 3  -CN      Time 3 20 sec  -CN              Exercise 4    Exercise Name 4 Nustep, L5  -CN      Cueing 4 Verbal; Demo  -CN      Time 4 5 min  -CN              Exercise 5    Exercise Name 5 Stair HS stretch  -CN      Cueing 5 Verbal; Demo  -CN      Reps 5 3  -CN      Time 5 20 sec  -CN              Exercise 6    Exercise Name 6 Stair calf stretch  -CN      Cueing 6 Verbal; Demo  -CN      Reps 6 3  -CN      Time 6 20 sec  -CN              Exercise 7    Exercise Name 7 Supine heel slide with strap  -CN      Cueing 7 Verbal; Demo  -CN      Reps 7 10  -CN      Time 7 10 sec  -CN      Additional Comments PT overpressure with last 5  -CN              Exercise 8    Exercise Name 8 QS with SLR  -CN      Cueing 8 Verbal; Demo  -CN      Sets 8 2  -CN      Reps 8 10  -CN      Additional Comments quad lag noted  -CN              Exercise 9    Exercise Name 9 SAQ  -CN      Cueing 9 Verbal; Demo  -CN      Sets 9 2  -CN      Reps 9 10  -CN      Additional Comments difficulty coordinating muscle contraction  -CN            User Key  (r) = Recorded By, (t) = Taken By, (c) = Cosigned By    Initials Name Provider Type    Laney Welch, PT Physical Therapist                         Manual Rx (last 36 hours)     Manual Treatments     Row Name 01/10/22 1100             Total Minutes    39260 - PT Manual Therapy Minutes 15  -CN              Manual Rx 1    Manual Rx 1 Location distraction with knee flex mob seated  -CN              Manual Rx 2    Manual Rx 2 Location Supine knee gapping  -CN              Manual Rx 3    Manual Rx 3 Location Prone hip flexor/knee flex stretch  -CN            User Key  (r) = Recorded By, (t) = Taken By, (c) = Cosigned By    Initials Name Provider Type    Laney Wlech, PT Physical Therapist                 PT OP Goals     Row Name 01/10/22 1200          PT Short Term Goals    STG Date to Achieve 02/06/22  -CN     STG 1 Pt will report  pain rated 2/10 at worst in order to demonstrate ability to return to normalized ADLs and functional activities.  -CN     STG 1 Progress Ongoing  -CN     STG 1 Progress Comments Pt with minimal pain overall.  -CN     STG 2 Pt will be independent with initial HEP for symptom management.  -CN     STG 2 Progress Ongoing  -CN     STG 2 Progress Comments Compliant with current HEP, progressing as tolerated.  -CN     STG 3 Pt will demonstrate knee AROM flex to 100 deg in order to improve ability to perform ADLs.  -CN     STG 3 Progress Ongoing  -CN            Long Term Goals    LTG Date to Achieve 03/06/22  -CN     LTG 1 Pt will be independent and compliant with advanced HEP for long term management of symptoms and prevention of future occurrence.  -CN     LTG 1 Progress Ongoing  -CN     LTG 2 Pt will reduce level of perceived disability as measured by the KOS-ADL  to 90% in order to improve QOL.  -CN     LTG 2 Progress Ongoing  -CN     LTG 3 Pt will demonstrate knee AROM 0-115 in order to improve ability to ascend/descend stairs and perform gait tasks.  -CN     LTG 3 Progress Ongoing  -CN     LTG 4 Pt will demonstrate L knee flex/ext strength to 4+/5 in order to improve endurance with recreational activities.  -CN     LTG 4 Progress Ongoing  -CN     LTG 5 Pt will demonstrate proper gait pattern without AD in order to reduce stress on affected tissues.  -CN     LTG 5 Progress Ongoing  -CN           User Key  (r) = Recorded By, (t) = Taken By, (c) = Cosigned By    Initials Name Provider Type    Laney Welch, PT Physical Therapist                Therapy Education  Given: HEP, Symptoms/condition management, Pain management  Program: Reinforced, Progressed  How Provided: Verbal, Demonstration, Written  Provided to: Patient  Level of Understanding: Teach back education performed, Verbalized, Demonstrated              Time Calculation:   Start Time: 1128  Stop Time: 1213  Time Calculation (min): 45 min  Timed  Charges  56625 - PT Therapeutic Exercise Minutes: 30  73018 - PT Manual Therapy Minutes: 15  Total Minutes  Timed Charges Total Minutes: 45   Total Minutes: 45  Therapy Charges for Today     Code Description Service Date Service Provider Modifiers Qty    61177341955 HC PT THER PROC EA 15 MIN 1/10/2022 Laney Russell, PT GP 2    27763517781 HC PT MANUAL THERAPY EA 15 MIN 1/10/2022 Laney Russell, PT GP 1                    Laney Russell PT  1/10/2022

## 2022-01-13 ENCOUNTER — HOSPITAL ENCOUNTER (OUTPATIENT)
Dept: PHYSICAL THERAPY | Facility: HOSPITAL | Age: 77
Setting detail: THERAPIES SERIES
Discharge: HOME | End: 2022-01-13

## 2022-01-13 DIAGNOSIS — M25.562 ACUTE PAIN OF LEFT KNEE: Primary | ICD-10-CM

## 2022-01-13 DIAGNOSIS — R26.2 DIFFICULTY WALKING: ICD-10-CM

## 2022-01-13 PROCEDURE — 97140 MANUAL THERAPY 1/> REGIONS: CPT

## 2022-01-13 PROCEDURE — 97110 THERAPEUTIC EXERCISES: CPT

## 2022-01-13 NOTE — THERAPY TREATMENT NOTE
Outpatient Physical Therapy Ortho Treatment Note  [facility]     Patient Name: Chuck Zheng  : 1945  MRN: [id number]  Today's Date: 2022      Visit Date: 2022    Visit Dx:    ICD-10-CM ICD-9-CM   1. Acute pain of left knee  M25.562 719.46   2. Difficulty walking  R26.2 719.7       Patient Active Problem List   Diagnosis   • Atopic rhinitis   • Benign essential hypertension   • Atherosclerosis of coronary artery   • Impaired fasting blood sugar   • Pure hypercholesterolemia   • Cobalamin deficiency   • Primary insomnia   • Health care maintenance   • Elevated PSA, less than 10 ng/ml   • PLACIDO on CPAP Check Pressure +11.5   • History of myocardial infarction   • Benign prostatic hyperplasia with incomplete bladder emptying   • Fatty liver   • Left anterior shoulder pain   • Primary osteoarthritis of left knee   • S/P knee surgery        Past Medical History:   Diagnosis Date   • Adhesive capsulitis of shoulder    • Arthritis    • Benign non-nodular prostatic hyperplasia 2016   • Benign prostatic hypertrophy    • Coronary artery disease    • Functional diarrhea 2017    Resolved with gluten free diet   • H/O cardiovascular stress test      normal   • Hemorrhoid    • History of COVID-19 2020   • Hyperlipidemia    • Hypertension    • Myocardial infarction (HCC)      2007, distal RCA 80% stenosis, s/p PTCA   • Nephrolithiasis     left kidney   • Obstructive sleep apnea     BIPAP   • Primary insomnia 2016   • Rupture of flexor tendon of hand    • Syncope     2004 neg w/u        Past Surgical History:   Procedure Laterality Date   • CHOLECYSTECTOMY     • COLONOSCOPY      2003, nl, , 2014 , nl, needs C-scope in    • CORONARY STENT PLACEMENT      2007 distal RCA   • EYE SURGERY      JOEY CATARACTS W IOL   • HAND SURGERY Right     THUMB SX   • KNEE ARTHROSCOPY Right        • KNEE ARTHROSCOPY Left 1/15/2019    Procedure: LEFT KNEE  ARTHROSCOPY,  PARTIAL MEDIAL MENISECTOMY;  Surgeon: Mason Ochoa MD;  Location: [facility] OR [facility];  Service: Orthopedics   • TONSILLECTOMY     • TOTAL KNEE ARTHROPLASTY Left 12/17/2021    Procedure: TOTAL KNEE ARTHROPLASTY;  Surgeon: Neal Goodwin MD;  Location: [facility] MAIN OR;  Service: Orthopedics;  Laterality: Left;                        PT Assessment/Plan     Row Name 01/13/22 1130          PT Assessment    Assessment Comments Pt with minimal pain following last session and good tolerance to manual stretching. Pt with significant muscle guarding and much difficulty with relaxation for manual stretching. Improved slightly with cues for hamstring activation, however conitnues with limited knee flex ROM, measured to 97 deg today.  -CN            PT Plan    PT Plan Comments Continue with emphasis on knee ROM and progress strenghtening as tolerated.  -CN           User Key  (r) = Recorded By, (t) = Taken By, (c) = Cosigned By    Initials Name Provider Type    CN Laney Russell, PT Physical Therapist                   OP Exercises     Row Name 01/13/22 1000 01/13/22 0900          Subjective Comments    Subjective Comments It feels good, I iced it when I got home but it was fine.  -CN --            Subjective Pain    Able to rate subjective pain? yes  -CN --     Pre-Treatment Pain Level 0  -CN --            Total Minutes    25809 - PT Therapeutic Exercise Minutes 25  -CN --     95963 - PT Manual Therapy Minutes -- 15  -CN            Exercise 1    Exercise Name 1 QS  -CN --     Cueing 1 Verbal; Demo; Tactile  -CN --     Reps 1 15  -CN --     Time 1 5 sec  -CN --            Exercise 3    Exercise Name 3 Stair knee flex stretch  -CN --     Cueing 3 Verbal; Demo  -CN --     Reps 3 3  -CN --     Time 3 20 sec  -CN --            Exercise 4    Exercise Name 4 Nustep, L5  -CN --     Cueing 4 Verbal; Demo  -CN --     Time 4 5 min  -CN --            Exercise 5    Exercise Name 5 Stair HS stretch  -CN --     Cueing  5 Verbal; Demo  -CN --     Reps 5 3  -CN --     Time 5 20 sec  -CN --            Exercise 6    Exercise Name 6 Stair calf stretch  -CN --     Cueing 6 Verbal; Demo  -CN --     Reps 6 3  -CN --     Time 6 20 sec  -CN --            Exercise 7    Exercise Name 7 Supine heel slide with strap  -CN --     Cueing 7 Verbal; Demo  -CN --     Reps 7 10  -CN --     Time 7 10 sec  -CN --     Additional Comments PT overpressure with last 5  -CN --            Exercise 8    Exercise Name 8 QS with SLR  -CN --     Cueing 8 Verbal; Demo  -CN --     Sets 8 2  -CN --     Reps 8 10  -CN --     Additional Comments quad lag noted  -CN --            Exercise 10    Exercise Name 10 RCB  -CN --     Cueing 10 Verbal  -CN --     Time 10 5 min  -CN --     Additional Comments rocking  -CN --           User Key  (r) = Recorded By, (t) = Taken By, (c) = Cosigned By    Initials Name Provider Type    Laney Welch, PT Physical Therapist                         Manual Rx (last 36 hours)     Manual Treatments     Row Name 01/13/22 0900             Total Minutes    05663 - PT Manual Therapy Minutes 15  -CN              Manual Rx 1    Manual Rx 1 Location distraction with knee flex mob seated  -CN              Manual Rx 2    Manual Rx 2 Location Supine knee gapping  -CN              Manual Rx 3    Manual Rx 3 Location Prone hip flexor/knee flex stretch  -CN              Manual Rx 4    Manual Rx 4 Location Supine hip/knee flex with cues for HS contraction  -CN            User Key  (r) = Recorded By, (t) = Taken By, (c) = Cosigned By    Initials Name Provider Type    Laney Welch, PT Physical Therapist                    Therapy Education  Given: HEP, Symptoms/condition management, Pain management  Program: Reinforced, Progressed  How Provided: Verbal, Demonstration, Written  Provided to: Patient  Level of Understanding: Teach back education performed, Verbalized, Demonstrated              Time Calculation:   Start Time:  1052  Stop Time: 1132  Time Calculation (min): 40 min  Timed Charges  90684 - PT Therapeutic Exercise Minutes: 25  38056 - PT Manual Therapy Minutes: 15  Total Minutes  Timed Charges Total Minutes: 25   Total Minutes: 25  Therapy Charges for Today     Code Description Service Date Service Provider Modifiers Qty    33990967291  PT THER PROC EA 15 MIN 1/13/2022 Laney Russell, PT GP 2    54633552713 HC PT MANUAL THERAPY EA 15 MIN 1/13/2022 Laney Russell, PT GP 1                    Laney Russell, PT  1/13/2022

## 2022-01-17 ENCOUNTER — HOSPITAL ENCOUNTER (OUTPATIENT)
Dept: PHYSICAL THERAPY | Facility: HOSPITAL | Age: 77
Setting detail: THERAPIES SERIES
Discharge: HOME | End: 2022-01-17

## 2022-01-17 DIAGNOSIS — M25.562 ACUTE PAIN OF LEFT KNEE: Primary | ICD-10-CM

## 2022-01-17 DIAGNOSIS — R26.2 DIFFICULTY WALKING: ICD-10-CM

## 2022-01-17 PROCEDURE — 97140 MANUAL THERAPY 1/> REGIONS: CPT

## 2022-01-17 PROCEDURE — 97110 THERAPEUTIC EXERCISES: CPT

## 2022-01-17 NOTE — THERAPY TREATMENT NOTE
Outpatient Physical Therapy Ortho Treatment Note  [facility]     Patient Name: Chuck Zheng  : 1945  MRN: [id number]  Today's Date: 2022      Visit Date: 2022    Visit Dx:    ICD-10-CM ICD-9-CM   1. Acute pain of left knee  M25.562 719.46   2. Difficulty walking  R26.2 719.7       Patient Active Problem List   Diagnosis   • Atopic rhinitis   • Benign essential hypertension   • Atherosclerosis of coronary artery   • Impaired fasting blood sugar   • Pure hypercholesterolemia   • Cobalamin deficiency   • Primary insomnia   • Health care maintenance   • Elevated PSA, less than 10 ng/ml   • PLACIDO on CPAP Check Pressure +11.5   • History of myocardial infarction   • Benign prostatic hyperplasia with incomplete bladder emptying   • Fatty liver   • Left anterior shoulder pain   • Primary osteoarthritis of left knee   • S/P knee surgery        Past Medical History:   Diagnosis Date   • Adhesive capsulitis of shoulder    • Arthritis    • Benign non-nodular prostatic hyperplasia 2016   • Benign prostatic hypertrophy    • Coronary artery disease    • Functional diarrhea 2017    Resolved with gluten free diet   • H/O cardiovascular stress test      normal   • Hemorrhoid    • History of COVID-19 2020   • Hyperlipidemia    • Hypertension    • Myocardial infarction (HCC)      2007, distal RCA 80% stenosis, s/p PTCA   • Nephrolithiasis     left kidney   • Obstructive sleep apnea     BIPAP   • Primary insomnia 2016   • Rupture of flexor tendon of hand    • Syncope     2004 neg w/u        Past Surgical History:   Procedure Laterality Date   • CHOLECYSTECTOMY     • COLONOSCOPY      2003, nl, , 2014 , nl, needs C-scope in    • CORONARY STENT PLACEMENT      2007 distal RCA   • EYE SURGERY      JOEY CATARACTS W IOL   • HAND SURGERY Right     THUMB SX   • KNEE ARTHROSCOPY Right        • KNEE ARTHROSCOPY Left 1/15/2019    Procedure: LEFT KNEE  ARTHROSCOPY,  PARTIAL MEDIAL MENISECTOMY;  Surgeon: Mason Ochoa MD;  Location: [facility] OR [place];  Service: Orthopedics   • TONSILLECTOMY     • TOTAL KNEE ARTHROPLASTY Left 12/17/2021    Procedure: TOTAL KNEE ARTHROPLASTY;  Surgeon: Neal Goodwin MD;  Location: [facility] MAIN OR;  Service: Orthopedics;  Laterality: Left;        PT Ortho     Row Name 01/17/22 1200       Posture/Observations    Observations Incision healing  -    Posture/Observations Comments cleaned incision with alcohol wipes, no s/s infection  -       Left Lower Ext    Lt Knee Extension/Flexion AROM AAROM to 100 in supine (97 last time)  -          User Key  (r) = Recorded By, (t) = Taken By, (c) = Cosigned By    Initials Name Provider Type     Ligia Leonard, PT Physical Therapist                             PT Assessment/Plan     Row Name 01/17/22 1209          PT Assessment    Assessment Comments Chuck returns to clinic with reports of minimal pain, continues to be limited into flexion. Spent increased time on manual this date with gentle STM to quad and patellar mobs in an effort to reduce muscle guarding. Measured flexion at end of manual interventions and ther ex ROM, able to achieve 100 degrees flexion with strap. Pt. continues to demonstrate small gains in ROM with each session, reports compliance with HEP. WIll continue to benefit from skilled PT.  -            PT Plan    PT Plan Comments consider DKTC?  continue manual  -           User Key  (r) = Recorded By, (t) = Taken By, (c) = Cosigned By    Initials Name Provider Type     Ligia Leonard, PT Physical Therapist                   OP Exercises     Row Name 01/17/22 1100             Subjective Comments    Subjective Comments It's doing okay, stairs are still difficult  -              Subjective Pain    Able to rate subjective pain? yes  -      Pre-Treatment Pain Level 0  -              Total Minutes    11514 - PT Therapeutic Exercise Minutes 27  -      93969 -  PT Manual Therapy Minutes 18  -MH              Exercise 3    Exercise Name 3 Stair knee flex stretch  -MH      Cueing 3 Verbal; Demo  -MH      Reps 3 5  -MH      Time 3 20sec  -MH              Exercise 4    Exercise Name 4 Nustep, L5  -MH      Cueing 4 Verbal; Demo  -MH      Time 4 6 min  -MH      Additional Comments seat 10, moved forward to seat 9 at 4 min ivett  -MH              Exercise 5    Exercise Name 5 Stair HS stretch  -MH      Cueing 5 Verbal; Demo  -MH      Reps 5 3  -MH      Time 5 20sec  -MH              Exercise 6    Exercise Name 6 Stair calf stretch  -MH      Cueing 6 Verbal; Demo  -MH      Reps 6 3  -MH      Time 6 20sec  -MH              Exercise 7    Exercise Name 7 Supine heel slide with strap  -MH      Cueing 7 Verbal; Demo  -MH      Reps 7 10  -MH      Time 7 10 sec  -MH              Exercise 8    Exercise Name 8 QS with SLR  -MH      Cueing 8 Verbal; Demo  -MH      Sets 8 2  -MH      Reps 8 10  -MH      Additional Comments quad lag noted  -MH              Exercise 10    Exercise Name 10 RCB  -MH      Cueing 10 Verbal  -MH      Time 10 5 min  -MH      Additional Comments rocking, seat 8  -MH            User Key  (r) = Recorded By, (t) = Taken By, (c) = Cosigned By    Initials Name Provider Type    Ligia Perez, PT Physical Therapist                         Manual Rx (last 36 hours)     Manual Treatments     Row Name 01/17/22 1100             Total Minutes    76011 - PT Manual Therapy Minutes 18  -MH              Manual Rx 1    Manual Rx 1 Location distraction with knee flex mob seated  -              Manual Rx 2    Manual Rx 2 Location Supine knee gapping  -MH              Manual Rx 3    Manual Rx 3 Location Prone hip flexor/knee flex stretch  -MH              Manual Rx 4    Manual Rx 4 Location Supine hip/knee flex with cues for HS contraction  -              Manual Rx 5    Manual Rx 5 Location gentle STM to quad, patellar mobs supinf/med/lat  -MH            User Key  (r) = Recorded  By, (t) = Taken By, (c) = Cosigned By    Initials Name Provider Type    Ligia Perez, PT Physical Therapist                 PT OP Goals     Row Name 01/17/22 1200          PT Short Term Goals    STG Date to Achieve 02/06/22  -     STG 1 Pt will report pain rated 2/10 at worst in order to demonstrate ability to return to normalized ADLs and functional activities.  -     STG 1 Progress Ongoing  -     STG 2 Pt will be independent with initial HEP for symptom management.  -     STG 2 Progress Ongoing  -     STG 3 Pt will demonstrate knee AROM flex to 100 deg in order to improve ability to perform ADLs.  -     STG 3 Progress Ongoing  -            Long Term Goals    LTG Date to Achieve 03/06/22  -     LTG 1 Pt will be independent and compliant with advanced HEP for long term management of symptoms and prevention of future occurrence.  -     LTG 1 Progress Ongoing  -     LTG 2 Pt will reduce level of perceived disability as measured by the KOS-ADL  to 90% in order to improve QOL.  -     LTG 2 Progress Ongoing  -     LTG 3 Pt will demonstrate knee AROM 0-115 in order to improve ability to ascend/descend stairs and perform gait tasks.  -     LTG 3 Progress Ongoing  -     LTG 4 Pt will demonstrate L knee flex/ext strength to 4+/5 in order to improve endurance with recreational activities.  -     LTG 4 Progress Ongoing  -     LTG 5 Pt will demonstrate proper gait pattern without AD in order to reduce stress on affected tissues.  -     LTG 5 Progress Ongoing  [facility]           User Key  (r) = Recorded By, (t) = Taken By, (c) = Cosigned By    Initials Name Provider Type    Ligia Perez PT Physical Therapist                Therapy Education  Education Details: continued education on s/s infection, DVT  Given: Symptoms/condition management, Posture/body mechanics  Program: Reinforced  How Provided: Verbal, Demonstration  Provided to: Patient  Level of Understanding: Verbalized,  Demonstrated              Time Calculation:   Start Time: 1131  Stop Time: 1216  Time Calculation (min): 45 min  Total Timed Code Minutes- PT: 45 minute(s)  Timed Charges  70813 - PT Therapeutic Exercise Minutes: 27  65142 - PT Manual Therapy Minutes: 18  Total Minutes  Timed Charges Total Minutes: 45   Total Minutes: 45  Therapy Charges for Today     Code Description Service Date Service Provider Modifiers Qty    95412267001 HC PT MANUAL THERAPY EA 15 MIN 1/17/2022 Ligia Leonard, PT GP 1    [id number] HC PT THER PROC EA 15 MIN 1/17/2022 Ligia Leonard, PT GP 2                    Ligia Leonard PT  1/17/2022

## 2022-01-20 ENCOUNTER — HOSPITAL ENCOUNTER (OUTPATIENT)
Dept: PHYSICAL THERAPY | Facility: HOSPITAL | Age: 77
Setting detail: THERAPIES SERIES
Discharge: HOME | End: 2022-01-20

## 2022-01-20 DIAGNOSIS — R26.2 DIFFICULTY WALKING: ICD-10-CM

## 2022-01-20 DIAGNOSIS — M25.562 ACUTE PAIN OF LEFT KNEE: Primary | ICD-10-CM

## 2022-01-20 PROCEDURE — 97110 THERAPEUTIC EXERCISES: CPT

## 2022-01-20 NOTE — THERAPY TREATMENT NOTE
Outpatient Physical Therapy Ortho Treatment Note  [facility]     Patient Name: Chuck Zheng  : 1945  MRN: [id number]  Today's Date: 2022      Visit Date: 2022    Visit Dx:    ICD-10-CM ICD-9-CM   1. Acute pain of left knee  M25.562 719.46   2. Difficulty walking  R26.2 719.7       Patient Active Problem List   Diagnosis   • Atopic rhinitis   • Benign essential hypertension   • Atherosclerosis of coronary artery   • Impaired fasting blood sugar   • Pure hypercholesterolemia   • Cobalamin deficiency   • Primary insomnia   • Health care maintenance   • Elevated PSA, less than 10 ng/ml   • PLACIDO on CPAP Check Pressure +11.5   • History of myocardial infarction   • Benign prostatic hyperplasia with incomplete bladder emptying   • Fatty liver   • Left anterior shoulder pain   • Primary osteoarthritis of left knee   • S/P knee surgery        Past Medical History:   Diagnosis Date   • Adhesive capsulitis of shoulder    • Arthritis    • Benign non-nodular prostatic hyperplasia 2016   • Benign prostatic hypertrophy    • Coronary artery disease    • Functional diarrhea 2017    Resolved with gluten free diet   • H/O cardiovascular stress test      normal   • Hemorrhoid    • History of COVID-19 2020   • Hyperlipidemia    • Hypertension    • Myocardial infarction (HCC)      2007, distal RCA 80% stenosis, s/p PTCA   • Nephrolithiasis     left kidney   • Obstructive sleep apnea     BIPAP   • Primary insomnia 2016   • Rupture of flexor tendon of hand    • Syncope     2004 neg w/u        Past Surgical History:   Procedure Laterality Date   • CHOLECYSTECTOMY     • COLONOSCOPY      2003, nl, , 2014 , nl, needs C-scope in    • CORONARY STENT PLACEMENT      2007 distal RCA   • EYE SURGERY      JOEY CATARACTS W IOL   • HAND SURGERY Right     THUMB SX   • KNEE ARTHROSCOPY Right        • KNEE ARTHROSCOPY Left 1/15/2019    Procedure: LEFT KNEE  ARTHROSCOPY,  PARTIAL MEDIAL MENISECTOMY;  Surgeon: Mason Ochoa MD;  Location: [facility] OR [facility] [place];  Service: Orthopedics   • TONSILLECTOMY     • TOTAL KNEE ARTHROPLASTY Left 12/17/2021    Procedure: TOTAL KNEE ARTHROPLASTY;  Surgeon: Neal Goodwin MD;  Location: [facility] MAIN OR;  Service: Orthopedics;  Laterality: Left;                        PT Assessment/Plan     Row Name 01/20/22 0900          PT Assessment    Assessment Comments Chuck arrives late to today's appointment, thus limiting overall progression of ther ex. Focused primarily on addressing knee flex a/prom within time constraints as pt continues to demo significant limitations in L knee flex. Reviewed POC with pt, he remains good candidate for skilled PT.  -CC            PT Plan    PT Plan Comments Continue manual, add DL leg press, wall slides  -CC           User Key  (r) = Recorded By, (t) = Taken By, (c) = Cosigned By    Initials Name Provider Type    Swati Buitrago, PT Physical Therapist                   OP Exercises     Row Name 01/20/22 0900             Subjective Comments    Subjective Comments Arrives at 919 for 915, then uses restroom, thus unable to start appt until 923. Reports no pain, just  -CC              Total Minutes    36927 - PT Therapeutic Exercise Minutes 27  -CC      24869 - PT Manual Therapy Minutes 3  -CC              Exercise 2    Exercise Name 2 quad str in chair standing  -CC      Cueing 2 Verbal  -CC      Reps 2 5 L  -CC      Time 2 20 sec  -CC              Exercise 3    Exercise Name 3 Stair knee flex stretch  -CC      Cueing 3 Verbal; Demo  -CC      Reps 3 5  -CC      Time 3 20sec  -CC              Exercise 4    Exercise Name 4 Nustep, L5  -CC      Cueing 4 Verbal; Demo  -CC      Time 4 5 min  -CC      Additional Comments seat 9  -CC              Exercise 5    Exercise Name 5 Stair HS stretch  -CC      Cueing 5 Verbal; Demo  -CC      Reps 5 3  -CC      Time 5 20sec  -CC              Exercise 6    Exercise Name  6 Stair calf stretch  -CC      Cueing 6 Verbal; Demo  -CC      Reps 6 3  -CC      Time 6 20sec  -CC              Exercise 9    Exercise Name 9 DKTC on SB  -CC      Cueing 9 Verbal  -CC      Sets 9 2  -CC      Reps 9 10  -CC      Time 9 3-5 sec  -CC      Additional Comments green ball  -CC              Exercise 10    Exercise Name 10 RCB  -CC      Cueing 10 Verbal  -CC      Time 10 5 min  -CC      Additional Comments rocking, seat 8  -CC            User Key  (r) = Recorded By, (t) = Taken By, (c) = Cosigned By    Initials Name Provider Type    Swati Buitrago, PT Physical Therapist                         Manual Rx (last 36 hours)     Manual Treatments     Row Name 01/20/22 0900             Total Minutes    42852 - PT Manual Therapy Minutes 3  -CC              Manual Rx 2    Manual Rx 2 Location Supine knee gapping  -CC            User Key  (r) = Recorded By, (t) = Taken By, (c) = Cosigned By    Initials Name Provider Type    CC Swati Hsu PT Physical Therapist                                   Time Calculation:   Start Time: 0923  Stop Time: 0953  Time Calculation (min): 30 min  Total Timed Code Minutes- PT: 30 minute(s)  Timed Charges  00532 - PT Therapeutic Exercise Minutes: 27  33317 - PT Manual Therapy Minutes: 3  Total Minutes  Timed Charges Total Minutes: 30   Total Minutes: 30  Therapy Charges for Today     Code Description Service Date Service Provider Modifiers Qty    69793682909  PT THER PROC EA 15 MIN 1/20/2022 Swati Hsu, PT GP 2                    Swati Hsu PT  1/20/2022

## 2022-01-24 ENCOUNTER — HOSPITAL ENCOUNTER (OUTPATIENT)
Dept: PHYSICAL THERAPY | Facility: HOSPITAL | Age: 77
Setting detail: THERAPIES SERIES
Discharge: HOME | End: 2022-01-24

## 2022-01-24 DIAGNOSIS — M25.562 ACUTE PAIN OF LEFT KNEE: Primary | ICD-10-CM

## 2022-01-24 DIAGNOSIS — R26.2 DIFFICULTY WALKING: ICD-10-CM

## 2022-01-24 PROCEDURE — 97140 MANUAL THERAPY 1/> REGIONS: CPT

## 2022-01-24 PROCEDURE — 97110 THERAPEUTIC EXERCISES: CPT

## 2022-01-24 NOTE — THERAPY TREATMENT NOTE
Outpatient Physical Therapy Ortho Treatment Note  [facility]     Patient Name: Chuck Zheng  : 1945  MRN: [id number]  Today's Date: 2022      Visit Date: 2022    Visit Dx:    ICD-10-CM ICD-9-CM   1. Acute pain of left knee  M25.562 719.46   2. Difficulty walking  R26.2 719.7       Patient Active Problem List   Diagnosis   • Atopic rhinitis   • Benign essential hypertension   • Atherosclerosis of coronary artery   • Impaired fasting blood sugar   • Pure hypercholesterolemia   • Cobalamin deficiency   • Primary insomnia   • Health care maintenance   • Elevated PSA, less than 10 ng/ml   • PLACIDO on CPAP Check Pressure +11.5   • History of myocardial infarction   • Benign prostatic hyperplasia with incomplete bladder emptying   • Fatty liver   • Left anterior shoulder pain   • Primary osteoarthritis of left knee   • S/P knee surgery        Past Medical History:   Diagnosis Date   • Adhesive capsulitis of shoulder    • Arthritis    • Benign non-nodular prostatic hyperplasia 2016   • Benign prostatic hypertrophy    • Coronary artery disease    • Functional diarrhea 2017    Resolved with gluten free diet   • H/O cardiovascular stress test      normal   • Hemorrhoid    • History of COVID-19 2020   • Hyperlipidemia    • Hypertension    • Myocardial infarction (HCC)      2007, distal RCA 80% stenosis, s/p PTCA   • Nephrolithiasis     left kidney   • Obstructive sleep apnea     BIPAP   • Primary insomnia 2016   • Rupture of flexor tendon of hand    • Syncope     2004 neg w/u        Past Surgical History:   Procedure Laterality Date   • CHOLECYSTECTOMY     • COLONOSCOPY      2003, nl, , 2014 , nl, needs C-scope in    • CORONARY STENT PLACEMENT      2007 distal RCA   • EYE SURGERY      JOEY CATARACTS W IOL   • HAND SURGERY Right     THUMB SX   • KNEE ARTHROSCOPY Right        • KNEE ARTHROSCOPY Left 1/15/2019    Procedure: LEFT KNEE  ARTHROSCOPY,  PARTIAL MEDIAL MENISECTOMY;  Surgeon: Mason Ochoa MD;  Location: [facility] [place] [facility] [place];  Service: Orthopedics   • TONSILLECTOMY     • TOTAL KNEE ARTHROPLASTY Left 12/17/2021    Procedure: TOTAL KNEE ARTHROPLASTY;  Surgeon: Neal Goodwin MD;  Location: [facility] MAIN OR;  Service: Orthopedics;  Laterality: Left;        PT Ortho     Row Name 01/24/22 1200       Left Lower Ext    Lt Knee Extension/Flexion AROM AROM to 101 supine  -          User Key  (r) = Recorded By, (t) = Taken By, (c) = Cosigned By    Initials Name Provider Type     Ligia Leonard, PT Physical Therapist                             PT Assessment/Plan     Row Name 01/24/22 1100          PT Assessment    Assessment Comments Chuck continues to demonstrate deficits in knee flexion ROM though able to tolerate lower seat height on both NuStep and recumbent bike to encourage increased knee flexion, resumed manual with reduced muscle guarding with seated distraction into flexion mobs and added wall slide with good tolerance. AROM flexion measured to 101 at end of session.  -            PT Plan    PT Plan Comments DL leg press, resume DKTC  -           User Key  (r) = Recorded By, (t) = Taken By, (c) = Cosigned By    Initials Name Provider Type     Ligia Leonard, PT Physical Therapist                   OP Exercises     Row Name 01/24/22 1100             Subjective Comments    Subjective Comments It was weird Saturday and Sunday morning it hurt so bad but now it feels almost no pain  -              Subjective Pain    Able to rate subjective pain? yes  -      Pre-Treatment Pain Level 0  -              Total Minutes    58136 - PT Therapeutic Exercise Minutes 31  -      95532 - PT Manual Therapy Minutes 10  -              Exercise 2    Exercise Name 2 quad str in chair standing  -      Cueing 2 Verbal  -      Sets 2 B UE support  -      Reps 2 3 L  -      Time 2 20 sec  -              Exercise 3    Exercise Name 3  Stair knee flex stretch  -MH      Cueing 3 Verbal; Demo  -MH      Reps 3 5  -MH      Time 3 20sec  -MH              Exercise 4    Exercise Name 4 Nustep, L5  -MH      Cueing 4 Verbal; Demo  -MH      Time 4 5 min  -MH      Additional Comments seat 8  -MH              Exercise 5    Exercise Name 5 Stair HS stretch  -MH      Cueing 5 Verbal; Demo  -MH      Reps 5 3  -MH      Time 5 20sec  -MH              Exercise 6    Exercise Name 6 Stair calf stretch  -MH      Cueing 6 Verbal; Demo  -MH      Reps 6 3  -MH      Time 6 20sec  -MH              Exercise 9    Exercise Name 9 DKTC on SB  -MH      Cueing 9 --  -MH      Sets 9 --  -MH      Reps 9 --  -MH      Time 9 --  -MH              Exercise 10    Exercise Name 10 RCB  -MH      Cueing 10 Verbal  -MH      Time 10 5 min  -MH      Additional Comments rocking, seat 7  -MH              Exercise 11    Exercise Name 11 wall slide  -MH      Cueing 11 Verbal; Demo  -MH      Reps 11 8  -MH      Time 11 5sec  -MH            User Key  (r) = Recorded By, (t) = Taken By, (c) = Cosigned By    Initials Name Provider Type     Ligia Leonard, PT Physical Therapist                         Manual Rx (last 36 hours)     Manual Treatments     Row Name 01/24/22 1100             Total Minutes    67042 - PT Manual Therapy Minutes 10  -MH              Manual Rx 1    Manual Rx 1 Location distraction with knee flex mob seated  -              Manual Rx 2    Manual Rx 2 Location Supine knee gapping  -              Manual Rx 3    Manual Rx 3 Location --  -MH              Manual Rx 4    Manual Rx 4 Location Supine hip/knee flex with cues for HS contraction  -              Manual Rx 5    Manual Rx 5 Location gentle STM to quad, patellar mobs supinf/med/lat  -            User Key  (r) = Recorded By, (t) = Taken By, (c) = Cosigned By    Initials Name Provider Type     Ligia Leonard, PT Physical Therapist                 PT OP Goals     Row Name 01/24/22 1100          PT Short Term Goals     STG Date to Achieve 02/06/22  -     STG 1 Pt will report pain rated 2/10 at worst in order to demonstrate ability to return to normalized ADLs and functional activities.  -     STG 1 Progress Ongoing  -     STG 2 Pt will be independent with initial HEP for symptom management.  -     STG 2 Progress Ongoing  -     STG 3 Pt will demonstrate knee AROM flex to 100 deg in order to improve ability to perform ADLs.  -     STG 3 Progress Ongoing  [facility]            Long Term Goals    LTG Date to Achieve 03/06/22  -     LTG 1 Pt will be independent and compliant with advanced HEP for long term management of symptoms and prevention of future occurrence.  -     LTG 1 Progress Ongoing  -     LTG 2 Pt will reduce level of perceived disability as measured by the KOS-ADL  to 90% in order to improve QOL.  -     LTG 2 Progress Ongoing  -     LTG 3 Pt will demonstrate knee AROM 0-115 in order to improve ability to ascend/descend stairs and perform gait tasks.  -     LTG 3 Progress Ongoing  -     LTG 4 Pt will demonstrate L knee flex/ext strength to 4+/5 in order to improve endurance with recreational activities.  -     LTG 4 Progress Ongoing  [facility]     LTG 5 Pt will demonstrate proper gait pattern without AD in order to reduce stress on affected tissues.  -     LTG 5 Progress Ongoing  [facility]           User Key  (r) = Recorded By, (t) = Taken By, (c) = Cosigned By    Initials Name Provider Type    Ligia Perez PT Physical Therapist                Therapy Education  Education Details: incision care  Given: Symptoms/condition management, Posture/body mechanics  Program: Reinforced  How Provided: Verbal, Demonstration  Provided to: Patient  Level of Understanding: Verbalized, Demonstrated              Time Calculation:   Start Time: 1135  Stop Time: 1218  Time Calculation (min): 43 min  Total Timed Code Minutes- PT: 41 minute(s)  Timed Charges  52204 - PT Therapeutic Exercise Minutes: 31  05277 - PT Manual  Therapy Minutes: 10  Total Minutes  Timed Charges Total Minutes: 41   Total Minutes: 41  Therapy Charges for Today     Code Description Service Date Service Provider Modifiers Qty    77985322504 HC PT MANUAL THERAPY EA 15 MIN 1/24/2022 Ligia Leonard, PT GP 1    26960443967 HC PT THER PROC EA 15 MIN 1/24/2022 Ligia Leonard, PT GP 2                    Ligia Leonard, PT  1/24/2022

## 2022-01-27 ENCOUNTER — HOSPITAL ENCOUNTER (OUTPATIENT)
Dept: PHYSICAL THERAPY | Facility: HOSPITAL | Age: 77
Setting detail: THERAPIES SERIES
Discharge: HOME | End: 2022-01-27

## 2022-01-27 ENCOUNTER — TELEPHONE (OUTPATIENT)
Dept: ORTHOPEDIC SURGERY | Facility: CLINIC | Age: 77
End: 2022-01-27

## 2022-01-27 DIAGNOSIS — M25.562 ACUTE PAIN OF LEFT KNEE: Primary | ICD-10-CM

## 2022-01-27 DIAGNOSIS — R26.2 DIFFICULTY WALKING: ICD-10-CM

## 2022-01-27 PROCEDURE — 97110 THERAPEUTIC EXERCISES: CPT

## 2022-01-27 PROCEDURE — 97140 MANUAL THERAPY 1/> REGIONS: CPT

## 2022-01-27 NOTE — THERAPY TREATMENT NOTE
Outpatient Physical Therapy Ortho Treatment Note  [facility]     Patient Name: Chuck Zheng  : 1945  MRN: [id number]  Today's Date: 2022      Visit Date: 2022    Visit Dx:    ICD-10-CM ICD-9-CM   1. Acute pain of left knee  M25.562 719.46   2. Difficulty walking  R26.2 719.7       Patient Active Problem List   Diagnosis   • Atopic rhinitis   • Benign essential hypertension   • Atherosclerosis of coronary artery   • Impaired fasting blood sugar   • Pure hypercholesterolemia   • Cobalamin deficiency   • Primary insomnia   • Health care maintenance   • Elevated PSA, less than 10 ng/ml   • PLACIDO on CPAP Check Pressure +11.5   • History of myocardial infarction   • Benign prostatic hyperplasia with incomplete bladder emptying   • Fatty liver   • Left anterior shoulder pain   • Primary osteoarthritis of left knee   • S/P knee surgery        Past Medical History:   Diagnosis Date   • Adhesive capsulitis of shoulder    • Arthritis    • Benign non-nodular prostatic hyperplasia 2016   • Benign prostatic hypertrophy    • Coronary artery disease    • Functional diarrhea 2017    Resolved with gluten free diet   • H/O cardiovascular stress test      normal   • Hemorrhoid    • History of COVID-19 2020   • Hyperlipidemia    • Hypertension    • Myocardial infarction (HCC)      2007, distal RCA 80% stenosis, s/p PTCA   • Nephrolithiasis     left kidney   • Obstructive sleep apnea     BIPAP   • Primary insomnia 2016   • Rupture of flexor tendon of hand    • Syncope     2004 neg w/u        Past Surgical History:   Procedure Laterality Date   • CHOLECYSTECTOMY     • COLONOSCOPY      2003, nl, , 2014 , nl, needs C-scope in    • CORONARY STENT PLACEMENT      2007 distal RCA   • EYE SURGERY      JOEY CATARACTS W IOL   • HAND SURGERY Right     THUMB SX   • KNEE ARTHROSCOPY Right        • KNEE ARTHROSCOPY Left 1/15/2019    Procedure: LEFT KNEE  ARTHROSCOPY,  PARTIAL MEDIAL MENISECTOMY;  Surgeon: Mason Ochoa MD;  Location: [facility];  Service: Orthopedics   • TONSILLECTOMY     • TOTAL KNEE ARTHROPLASTY Left 12/17/2021    Procedure: TOTAL KNEE ARTHROPLASTY;  Surgeon: Neal Goodwin MD;  Location: [facility] MAIN OR;  Service: Orthopedics;  Laterality: Left;                        PT Assessment/Plan     Row Name 01/27/22 1137          PT Assessment    Assessment Comments Pt with minimal pain overall and plans to return to work on Monday. Discussed seated stretches as well as walking around office every 30-45 min. Pt continues with knee flexion restriction with slow improvement with manual. Added leg press with good tolerance and cues for slow, controlled motion.  -CN            PT Plan    PT Plan Comments Continue with progressive strengthening and manual for increased ROM.  -CN           User Key  (r) = Recorded By, (t) = Taken By, (c) = Cosigned By    Initials Name Provider Type    Laney Welch, PT Physical Therapist                   OP Exercises     Row Name 01/27/22 1000 01/27/22 0900          Subjective Comments    Subjective Comments It feels pretty good today. I have some days that it feels fine and other days that I have a little pain. I got the go ahead to go back to work this morning so I go back on Monday.  -CN --            Subjective Pain    Able to rate subjective pain? yes  -CN --     Pre-Treatment Pain Level 0  -CN --            Total Minutes    82987 - PT Therapeutic Exercise Minutes 31  -CN --     08287 - PT Manual Therapy Minutes -- 10  -CN            Exercise 3    Exercise Name 3 Stair knee flex stretch  -CN --     Cueing 3 Verbal; Demo  -CN --     Reps 3 5  -CN --     Time 3 20sec  -CN --            Exercise 4    Exercise Name 4 Nustep, L5  -CN --     Additional Comments all Nusteps busy, held today  -CN --            Exercise 5    Exercise Name 5 Stair HS stretch  -CN --     Cueing 5 Verbal; Demo  -CN --     Reps 5  3  -CN --     Time 5 20sec  -CN --            Exercise 6    Exercise Name 6 Stair calf stretch  -CN --     Cueing 6 Verbal; Demo  -CN --     Reps 6 3  -CN --     Time 6 20sec  -CN --            Exercise 7    Exercise Name 7 B leg press  -CN --     Cueing 7 Verbal; Demo  -CN --     Sets 7 2  -CN --     Reps 7 10  -CN --     Additional Comments 80#  -CN --            Exercise 8    Exercise Name 8 Discussed seated exercises for return to work  -CN --     Cueing 8 Verbal; Demo  -CN --     Time 8 4 min  -CN --            Exercise 10    Exercise Name 10 RCB  -CN --     Cueing 10 Verbal  -CN --     Time 10 5 min  -CN --     Additional Comments rocking, seat 7  -CN --            Exercise 11    Exercise Name 11 wall slide  -CN --     Cueing 11 Verbal; Demo  -CN --     Reps 11 10  -CN --     Time 11 5sec  -CN --     Additional Comments R LE overpressure  -CN --           User Key  (r) = Recorded By, (t) = Taken By, (c) = Cosigned By    Initials Name Provider Type    Laney Welch, PT Physical Therapist                         Manual Rx (last 36 hours)     Manual Treatments     Row Name 01/27/22 0900             Total Minutes    50033 - PT Manual Therapy Minutes 10  -CN              Manual Rx 1    Manual Rx 1 Location distraction with knee flex mob seated  -CN              Manual Rx 2    Manual Rx 2 Location Supine knee gapping  -CN              Manual Rx 4    Manual Rx 4 Location Supine hip/knee flex with cues for HS contraction  -CN              Manual Rx 5    Manual Rx 5 Location gentle STM to quad, patellar mobs supinf/med/lat  -CN            User Key  (r) = Recorded By, (t) = Taken By, (c) = Cosigned By    Initials Name Provider Type    Laney Welch, PT Physical Therapist                    Therapy Education  Given: Symptoms/condition management, Posture/body mechanics  Program: Reinforced  How Provided: Verbal, Demonstration  Provided to: Patient  Level of Understanding: Verbalized,  Demonstrated              Time Calculation:   Start Time: 1050  Stop Time: 1133  Time Calculation (min): 43 min  Timed Charges  45619 - PT Therapeutic Exercise Minutes: 31  68724 - PT Manual Therapy Minutes: 10  Total Minutes  Timed Charges Total Minutes: 31   Total Minutes: 31  Therapy Charges for Today     Code Description Service Date Service Provider Modifiers Qty    43770150613 HC PT THER PROC EA 15 MIN 1/27/2022 Laney Russell, PT GP 2    81042385689 HC PT MANUAL THERAPY EA 15 MIN 1/27/2022 Laney Russell, PT GP 1                    Laney Russell, PT  1/27/2022

## 2022-01-31 ENCOUNTER — HOSPITAL ENCOUNTER (OUTPATIENT)
Dept: PHYSICAL THERAPY | Facility: HOSPITAL | Age: 77
Setting detail: THERAPIES SERIES
Discharge: HOME | End: 2022-01-31

## 2022-01-31 DIAGNOSIS — R26.2 DIFFICULTY WALKING: ICD-10-CM

## 2022-01-31 DIAGNOSIS — M25.562 ACUTE PAIN OF LEFT KNEE: Primary | ICD-10-CM

## 2022-01-31 PROCEDURE — 97110 THERAPEUTIC EXERCISES: CPT

## 2022-02-01 ENCOUNTER — TELEPHONE (OUTPATIENT)
Dept: ORTHOPEDIC SURGERY | Facility: CLINIC | Age: 77
End: 2022-02-01

## 2022-02-01 NOTE — TELEPHONE ENCOUNTER
From: Courtney Silva On: 01/03/2022 12:32 PM   To: Neal Goodwin MD   Priority: Routine   Routing Comments:   [facility].

## 2022-02-02 ENCOUNTER — HOSPITAL ENCOUNTER (OUTPATIENT)
Dept: PHYSICAL THERAPY | Facility: HOSPITAL | Age: 77
Setting detail: THERAPIES SERIES
Discharge: HOME | End: 2022-02-02

## 2022-02-02 DIAGNOSIS — M25.562 ACUTE PAIN OF LEFT KNEE: Primary | ICD-10-CM

## 2022-02-02 DIAGNOSIS — R26.2 DIFFICULTY WALKING: ICD-10-CM

## 2022-02-02 PROCEDURE — 97140 MANUAL THERAPY 1/> REGIONS: CPT

## 2022-02-02 PROCEDURE — 97110 THERAPEUTIC EXERCISES: CPT

## 2022-02-02 NOTE — THERAPY TREATMENT NOTE
Outpatient Physical Therapy Ortho Treatment Note  [facility]     Patient Name: Chuck Zheng  : 1945  MRN: [id number]  Today's Date: 2022      Visit Date: 2022    Visit Dx:    ICD-10-CM ICD-9-CM   1. Acute pain of left knee  M25.562 719.46   2. Difficulty walking  R26.2 719.7       Patient Active Problem List   Diagnosis   • Atopic rhinitis   • Benign essential hypertension   • Atherosclerosis of coronary artery   • Impaired fasting blood sugar   • Pure hypercholesterolemia   • Cobalamin deficiency   • Primary insomnia   • Health care maintenance   • Elevated PSA, less than 10 ng/ml   • PLACIDO on CPAP Check Pressure +11.5   • History of myocardial infarction   • Benign prostatic hyperplasia with incomplete bladder emptying   • Fatty liver   • Left anterior shoulder pain   • Primary osteoarthritis of left knee   • S/P knee surgery        Past Medical History:   Diagnosis Date   • Adhesive capsulitis of shoulder    • Arthritis    • Benign non-nodular prostatic hyperplasia 2016   • Benign prostatic hypertrophy    • Coronary artery disease    • Functional diarrhea 2017    Resolved with gluten free diet   • H/O cardiovascular stress test      normal   • Hemorrhoid    • History of COVID-19 2020   • Hyperlipidemia    • Hypertension    • Myocardial infarction (HCC)      2007, distal RCA 80% stenosis, s/p PTCA   • Nephrolithiasis     left kidney   • Obstructive sleep apnea     BIPAP   • Primary insomnia 2016   • Rupture of flexor tendon of hand    • Syncope     2004 neg w/u        Past Surgical History:   Procedure Laterality Date   • CHOLECYSTECTOMY     • COLONOSCOPY      2003, nl, , 2014 , nl, needs C-scope in    • CORONARY STENT PLACEMENT      2007 distal RCA   • EYE SURGERY      JOEY CATARACTS W IOL   • HAND SURGERY Right     THUMB SX   • KNEE ARTHROSCOPY Right        • KNEE ARTHROSCOPY Left 1/15/2019    Procedure: LEFT KNEE  ARTHROSCOPY,  PARTIAL MEDIAL MENISECTOMY;  Surgeon: Mason Ochoa MD;  Location: [facility] OR [facility] [place];  Service: Orthopedics   • TONSILLECTOMY     • TOTAL KNEE ARTHROPLASTY Left 12/17/2021    Procedure: TOTAL KNEE ARTHROPLASTY;  Surgeon: Neal Goodwin MD;  Location: [facility] MAIN OR;  Service: Orthopedics;  Laterality: Left;        PT Ortho     Row Name 02/02/22 1400       Left Lower Ext    Lt Knee Extension/Flexion AROM flex AAROM 98 in HL  -RS          User Key  (r) = Recorded By, (t) = Taken By, (c) = Cosigned By    Initials Name Provider Type    RS Bessy Low PT Physical Therapist                             PT Assessment/Plan     Row Name 02/02/22 1500          PT Assessment    Assessment Comments Mr. Zheng reports stiffness L knee this date, he has been busy at work. He has noticed some improvement when he uses ice therefore will continue to ice regularly. COntinued with current POC and added step taps focused on knee flexion, STS with controlled lowering and equal WB, and continued gait training. Pt with continued limitations in L quad activation (quad lag during SLR) and knee flexion (98 degrees this date). Discussed gait pattern and practicing at home (set a time for 5 min, focus on heel toe) and STS with equal WB from [employer] height chair, pt receptive. He remains a good candidate for PT to address the abaove outlined limitations.  -RS            PT Plan    PT Plan Comments Continue to focus on ROM and gait patter, consider resuming leg press, prone quad set  -RS           User Key  (r) = Recorded By, (t) = Taken By, (c) = Cosigned By    Initials Name Provider Type    RS Bessy Low PT Physical Therapist                   OP Exercises     Row Name 02/02/22 1400             Subjective Comments    Subjective Comments Pt reports stiffness today, has been busy at work, using ice more  -RS              Subjective Pain    Able to rate subjective pain? yes  -RS      Subjective Pain Comment  stiff  -RS              Total Minutes    53709 - PT Therapeutic Exercise Minutes 36  -RS      97544 - PT Manual Therapy Minutes 8  -RS              Exercise 1    Exercise Name 1 QS  -RS      Cueing 1 Verbal; Tactile; Demo  -RS      Sets 1 2  -RS      Reps 1 10  -RS      Time 1 5s  -RS      Additional Comments pt has good initial contraction, but has difficulty sustaining it  -RS              Exercise 2    Exercise Name 2 SLR  -RS      Cueing 2 Verbal; Tactile; Demo  -RS      Sets 2 2  -RS      Reps 2 10  -RS      Additional Comments cuing to avoid ext. lag.  -RS              Exercise 3    Exercise Name 3 Stair knee flex stretch  -RS      Cueing 3 Verbal  -RS      Reps 3 5  -RS      Time 3 20s  -RS              Exercise 4    Exercise Name 4 Nustep, L5  -RS      Cueing 4 Verbal  -RS      Reps 4 5 min  -RS      Additional Comments L5  -RS              Exercise 5    Exercise Name 5 Stair HS stretch  -RS      Cueing 5 Verbal  -RS      Reps 5 3  -RS      Time 5 20sec  -RS              Exercise 6    Exercise Name 6 Stair calf stretch  -RS      Cueing 6 Verbal; Demo  -RS      Reps 6 3  -RS      Time 6 20s  -RS              Exercise 9    Exercise Name 9 DKTC on SB  -RS      Cueing 9 Verbal; Tactile  -RS      Sets 9 2  -RS      Reps 9 10  -RS      Time 9 3-5 sec  -RS              Exercise 10    Exercise Name 10 RCB  -RS      Cueing 10 Verbal  -RS      Time 10 5 min  -RS      Additional Comments at end  -RS              Exercise 13    Exercise Name 13 SAQ  -RS      Cueing 13 Verbal; Tactile; Demo  -RS      Sets 13 1  -RS      Reps 13 15  -RS      Additional Comments 3#  -RS              Exercise 14    Exercise Name 14 Gait with focused heel strike  -RS      Cueing 14 Verbal; Tactile; Demo  -RS      Time 14 5 min  -RS              Exercise 15    Exercise Name 15 stair flex  -RS      Cueing 15 Verbal; Tactile; Demo  -RS      Reps 15 5  -RS      Time 15 20  -RS            User Key  (r) = Recorded By, (t) = Taken By, (c) =  Cosigned By    Initials Name Provider Type    RS Bessy Low, PT Physical Therapist                         Manual Rx (last 36 hours)     Manual Treatments     Row Name 02/02/22 1400             Total Minutes    53025 - PT Manual Therapy Minutes 8  -RS              Manual Rx 2    Manual Rx 2 Location Supine knee gapping  -RS              Manual Rx 5    Manual Rx 5 Location gentle STM to quad, patellar mobs supinf/med/lat  -RS            User Key  (r) = Recorded By, (t) = Taken By, (c) = Cosigned By    Initials Name Provider Type    RS Bessy Low, PT Physical Therapist                 PT OP Goals     Row Name 02/02/22 1400          PT Short Term Goals    STG Date to Achieve 02/06/22  -RS     STG 1 Pt will report pain rated 2/10 at worst in order to demonstrate ability to return to normalized ADLs and functional activities.  -RS     STG 1 Progress Progressing  -RS     STG 2 Pt will be independent with initial HEP for symptom management.  -RS     STG 2 Progress Progressing  -RS     STG 3 Pt will demonstrate knee AROM flex to 100 deg in order to improve ability to perform ADLs.  -RS     STG 3 Progress Ongoing  -RS            Long Term Goals    LTG Date to Achieve 03/06/22  -RS     LTG 1 Pt will be independent and compliant with advanced HEP for long term management of symptoms and prevention of future occurrence.  -RS     LTG 1 Progress Ongoing  -RS     LTG 2 Pt will reduce level of perceived disability as measured by the KOS-ADL  to 90% in order to improve QOL.  -RS     LTG 2 Progress Ongoing  -RS     LTG 3 Pt will demonstrate knee AROM 0-115 in order to improve ability to ascend/descend stairs and perform gait tasks.  -RS     LTG 3 Progress Ongoing  -RS     LTG 4 Pt will demonstrate L knee flex/ext strength to 4+/5 in order to improve endurance with recreational activities.  -RS     LTG 4 Progress Ongoing  -RS     LTG 5 Pt will demonstrate proper gait pattern without AD in order to reduce stress on  affected tissues.  -RS     LTG 5 Progress Ongoing  -RS           User Key  (r) = Recorded By, (t) = Taken By, (c) = Cosigned By    Initials Name Provider Type    RS Bessy Low PT Physical Therapist                Therapy Education  Education Details: gait pattern, HEP, STS  Program: Reinforced  How Provided: Verbal, Demonstration  Provided to: Patient  Level of Understanding: Verbalized, Demonstrated              Time Calculation:   Start Time: 1445  Stop Time: 1530  Time Calculation (min): 45 min  Timed Charges  05278 - PT Therapeutic Exercise Minutes: 36  23070 - PT Manual Therapy Minutes: 8  Total Minutes  Timed Charges Total Minutes: 44   Total Minutes: 44  Therapy Charges for Today     Code Description Service Date Service Provider Modifiers Qty    09773748613 HC PT THER PROC EA 15 MIN 2/2/2022 Bessy Low, PT GP 2    40200686464 HC PT MANUAL THERAPY EA 15 MIN 2/2/2022 Bessy Low, PT GP 1                    Bessy Low PT  2/2/2022

## 2022-02-03 ENCOUNTER — APPOINTMENT (OUTPATIENT)
Dept: PHYSICAL THERAPY | Facility: HOSPITAL | Age: 77
End: 2022-02-03

## 2022-02-07 ENCOUNTER — HOSPITAL ENCOUNTER (OUTPATIENT)
Dept: PHYSICAL THERAPY | Facility: HOSPITAL | Age: 77
Setting detail: THERAPIES SERIES
Discharge: HOME | End: 2022-02-07

## 2022-02-07 DIAGNOSIS — M25.562 ACUTE PAIN OF LEFT KNEE: Primary | ICD-10-CM

## 2022-02-07 DIAGNOSIS — R26.2 DIFFICULTY WALKING: ICD-10-CM

## 2022-02-07 PROCEDURE — 97110 THERAPEUTIC EXERCISES: CPT

## 2022-02-07 PROCEDURE — 97140 MANUAL THERAPY 1/> REGIONS: CPT

## 2022-02-07 NOTE — THERAPY PROGRESS REPORT/RE-CERT
Outpatient Physical Therapy Ortho Re-Assessment  [facility]     Patient Name: Chuck Zheng  : 1945  MRN: [id number]  Today's Date: 2022      Visit Date: 2022    Patient Active Problem List   Diagnosis   • Atopic rhinitis   • Benign essential hypertension   • Atherosclerosis of coronary artery   • Impaired fasting blood sugar   • Pure hypercholesterolemia   • Cobalamin deficiency   • Primary insomnia   • Health care maintenance   • Elevated PSA, less than 10 ng/ml   • PLACIDO on CPAP Check Pressure +11.5   • History of myocardial infarction   • Benign prostatic hyperplasia with incomplete bladder emptying   • Fatty liver   • Left anterior shoulder pain   • Primary osteoarthritis of left knee   • S/P knee surgery        Past Medical History:   Diagnosis Date   • Adhesive capsulitis of shoulder    • Arthritis    • Benign non-nodular prostatic hyperplasia 2016   • Benign prostatic hypertrophy    • Coronary artery disease    • Functional diarrhea 2017    Resolved with gluten free diet   • H/O cardiovascular stress test      normal   • Hemorrhoid    • History of COVID-19 2020   • Hyperlipidemia    • Hypertension    • Myocardial infarction (HCC)      2007, distal RCA 80% stenosis, s/p PTCA   • Nephrolithiasis     left kidney   • Obstructive sleep apnea     BIPAP   • Primary insomnia 2016   • Rupture of flexor tendon of hand    • Syncope     2004 neg w/u        Past Surgical History:   Procedure Laterality Date   • CHOLECYSTECTOMY     • COLONOSCOPY      2003, nl, , 2014 , nl, needs C-scope in    • CORONARY STENT PLACEMENT      2007 distal RCA   • EYE SURGERY      JOEY CATARACTS W IOL   • HAND SURGERY Right     THUMB SX   • KNEE ARTHROSCOPY Right        • KNEE ARTHROSCOPY Left 1/15/2019    Procedure: LEFT KNEE ARTHROSCOPY,  PARTIAL MEDIAL MENISECTOMY;  Surgeon: Mason Ochoa MD;  [place] [facility] [place] [facility] [place]  Service: Orthopedics   •  TONSILLECTOMY     • TOTAL KNEE ARTHROPLASTY Left 12/17/2021    Procedure: TOTAL KNEE ARTHROPLASTY;  Surgeon: Neal Goodwin MD;  Location: [facility] OR;  Service: Orthopedics;  Laterality: Left;       Visit Dx:     ICD-10-CM ICD-9-CM   1. Acute pain of left knee  M25.562 719.46   2. Difficulty walking  R26.2 719.7              PT Ortho     Row Name 02/07/22 1600       Myotomal Screen- Lower Quarter Clearing    Knee extension (L3) Left:; 4 (Good)  -CN    Knee flexion (S2) Left:; 4 (Good)  -CN       Left Lower Ext    Lt Knee Extension/Flexion AROM 4-97  -CN          User Key  (r) = Recorded By, (t) = Taken By, (c) = Cosigned By    Initials Name Provider Type    Laney Welch, PT Physical Therapist                            Therapy Education  Given: HEP, Pain management  Program: Reinforced  How Provided: Verbal, Demonstration  Provided to: Patient  Level of Understanding: Verbalized, Demonstrated      PT OP Goals     Row Name 02/07/22 1500          PT Short Term Goals    STG Date to Achieve 02/06/22  -CN     STG 1 Pt will report pain rated 2/10 at worst in order to demonstrate ability to return to normalized ADLs and functional activities.  -CN     STG 1 Progress Met  -CN     STG 1 Progress Comments Pt reports minimal to no pain.  -CN     STG 2 Pt will be independent with initial HEP for symptom management.  -CN     STG 2 Progress Met  -CN     STG 3 Pt will demonstrate knee AROM flex to 100 deg in order to improve ability to perform ADLs.  -CN     STG 3 Progress Progressing  -CN     STG 3 Progress Comments ROM measured 4-97 deg today.  -CN            Long Term Goals    LTG Date to Achieve 03/06/22  -CN     LTG 1 Pt will be independent and compliant with advanced HEP for long term management of symptoms and prevention of future occurrence.  -CN     LTG 1 Progress Ongoing  -CN     LTG 1 Progress Comments Progressing as tolerated.  -CN     LTG 2 Pt will reduce level of perceived disability as measured by  the KOS-ADL  to 90% in order to improve QOL.  -CN     LTG 2 Progress Progressing  -CN     LTG 3 Pt will demonstrate knee AROM 0-115 in order to improve ability to ascend/descend stairs and perform gait tasks.  -CN     LTG 3 Progress Ongoing; Progressing  -CN     LTG 4 Pt will demonstrate L knee flex/ext strength to 4+/5 in order to improve endurance with recreational activities.  -CN     LTG 4 Progress Progressing  -CN     LTG 4 Progress Comments Knee flex/ext strength rated 4/5 today.  -CN     LTG 5 Pt will demonstrate proper gait pattern without AD in order to reduce stress on affected tissues.  -CN     LTG 5 Progress Ongoing  -CN     LTG 5 Progress Comments Continues with significant gait impairment including dec knee ext, heel strike, toe off.  -CN           User Key  (r) = Recorded By, (t) = Taken By, (c) = Cosigned By    Initials Name Provider Type    Laney Welch, PT Physical Therapist                 PT Assessment/Plan     Row Name 02/07/22 1849          PT Assessment    Functional Limitations Decreased safety during functional activities; Impaired gait; Impaired locomotion; Limitation in home management; Limitations in community activities; Limitations in functional capacity and performance; Performance in leisure activities; Performance in self-care ADL; Performance in work activities  -CN     Impairments Balance; Edema; Gait; Endurance; Impaired flexibility; Muscle strength; Pain; Posture; Range of motion  -CN     Assessment Comments Chuck AMY Zheng has been seen for 10  physical therapy sessions for L TKA on 12/17/21.  Treatment has included therapeutic exercise, manual therapy, gait training and patient education with home exercise program . Progress to physical therapy goals is fair. Pt has met 2/3 STG and 0/5 LTG. Pt with slowly improving ROM, increased from 90 deg flex to 97 deg flex and no change in knee ext. Pt is performing HEP and manual therapy has been performed in clinic for  increased ROM, however limited by pt tolerance and muscle guarding. Discussed importance of regaining full ROM as pt previously very active and plans to resume hiking and bowling in the coming months. Pt with impaired gait pattern with minimal knee flex, heel strike and toe off noted as well as poor self awareness of deficits. Pt also demonstrates difficulty with stairs and requires many cues for correct performance.  He will benefit from continued skilled physical therapy to address remaining impairments and functional limitations.  -CN     Please refer to paper survey for additional self-reported information Yes  -CN     Rehab Potential Good  -CN     Patient/caregiver participated in establishment of treatment plan and goals Yes  -CN     Patient would benefit from skilled therapy intervention Yes  -CN            PT Plan    PT Frequency 2x/week  -CN     PT Plan Comments Continue with ROM tasks, step ups, small range lunge, wall squat with ball next visit.  -CN           User Key  (r) = Recorded By, (t) = Taken By, (c) = Cosigned By    Initials Name Provider Type    Laney Welch, PT Physical Therapist                   OP Exercises     Row Name 02/07/22 1500             Subjective Comments    Subjective Comments I haven't had pain for about a month unless I take a step wrong. I was discouraged at the end of last week because I didn't feel like it was getting better. But this week has been better.  -CN              Subjective Pain    Able to rate subjective pain? yes  -CN      Pre-Treatment Pain Level 0  -CN              Total Minutes    25724 - PT Therapeutic Exercise Minutes 35  -CN      30376 - PT Manual Therapy Minutes 8  -CN              Exercise 3    Exercise Name 3 Stair knee flex stretch  -CN      Cueing 3 Verbal  -CN      Reps 3 5  -CN      Time 3 20s  -CN              Exercise 4    Exercise Name 4 Nustep, L5  -CN      Cueing 4 Verbal  -CN      Reps 4 5 min  -CN      Additional Comments L5  -CN  "             Exercise 5    Exercise Name 5 Stair HS stretch  -CN      Cueing 5 Verbal  -CN      Reps 5 3  -CN      Time 5 20sec  -CN              Exercise 6    Exercise Name 6 Stair calf stretch  -CN      Cueing 6 Verbal; Demo  -CN      Reps 6 3  -CN      Time 6 20s  -CN              Exercise 7    Exercise Name 7 B leg press  -CN      Cueing 7 Verbal; Demo  -CN      Sets 7 2  -CN      Reps 7 10  -CN      Additional Comments 90#  -CN              Exercise 10    Exercise Name 10 RCB  -CN      Cueing 10 Verbal  -CN      Time 10 5 min  -CN      Additional Comments at end, seat 7, rocking  -CN              Exercise 16    Exercise Name 16 6\" step up  -CN      Cueing 16 Verbal; Demo  -CN      Reps 16 B  -CN      Additional Comments with knee , many cues for forward weight shift, L knee flex  -CN            User Key  (r) = Recorded By, (t) = Taken By, (c) = Cosigned By    Initials Name Provider Type    Laney Welch, PT Physical Therapist              Manual Rx (last 36 hours)     Manual Treatments     Row Name 02/07/22 1500             Total Minutes    62689 - PT Manual Therapy Minutes 8  -CN              Manual Rx 2    Manual Rx 2 Location Supine knee gapping  -CN              Manual Rx 5    Manual Rx 5 Location gentle STM to quad, patellar mobs supinf/med/lat  -CN            User Key  (r) = Recorded By, (t) = Taken By, (c) = Cosigned By    Initials Name Provider Type    Laney Welch, PT Physical Therapist                            Outcome Measure Options: Knee Outcome Score- ADL  Knee Outcome Score  Knee Outcome Score Comments: 79% where 100% is no disability      Time Calculation:     Start Time: 1536  Stop Time: 1620  Time Calculation (min): 44 min  Timed Charges  45202 - PT Therapeutic Exercise Minutes: 35  87847 - PT Manual Therapy Minutes: 8  Total Minutes  Timed Charges Total Minutes: 43   Total Minutes: 43     Therapy Charges for Today     Code Description Service Date Service " Provider Modifiers Qty    49591879101 HC PT THER PROC EA 15 MIN 2/7/2022 Laney Russell, PT GP 2    [id number] HC PT MANUAL THERAPY EA 15 MIN 2/7/2022 Laney Russell, PT GP 1          PT G-Codes  Outcome Measure Options: Knee Outcome Score- ADL         Laney Russell, PT  2/7/2022

## 2022-02-09 ENCOUNTER — HOSPITAL ENCOUNTER (OUTPATIENT)
Dept: PHYSICAL THERAPY | Facility: HOSPITAL | Age: 77
Setting detail: THERAPIES SERIES
Discharge: HOME | End: 2022-02-09

## 2022-02-09 DIAGNOSIS — R26.2 DIFFICULTY WALKING: ICD-10-CM

## 2022-02-09 DIAGNOSIS — M25.562 ACUTE PAIN OF LEFT KNEE: Primary | ICD-10-CM

## 2022-02-09 PROCEDURE — 97140 MANUAL THERAPY 1/> REGIONS: CPT

## 2022-02-09 PROCEDURE — 97110 THERAPEUTIC EXERCISES: CPT

## 2022-02-09 NOTE — THERAPY TREATMENT NOTE
Outpatient Physical Therapy Ortho Treatment Note  [facility]     Patient Name: Chuck Zheng  : 1945  MRN: [id number]  Today's Date: 2022      Visit Date: 2022    Visit Dx:    ICD-10-CM ICD-9-CM   1. Acute pain of left knee  M25.562 719.46   2. Difficulty walking  R26.2 719.7       Patient Active Problem List   Diagnosis   • Atopic rhinitis   • Benign essential hypertension   • Atherosclerosis of coronary artery   • Impaired fasting blood sugar   • Pure hypercholesterolemia   • Cobalamin deficiency   • Primary insomnia   • Health care maintenance   • Elevated PSA, less than 10 ng/ml   • PLACIDO on CPAP Check Pressure +11.5   • History of myocardial infarction   • Benign prostatic hyperplasia with incomplete bladder emptying   • Fatty liver   • Left anterior shoulder pain   • Primary osteoarthritis of left knee   • S/P knee surgery        Past Medical History:   Diagnosis Date   • Adhesive capsulitis of shoulder    • Arthritis    • Benign non-nodular prostatic hyperplasia 2016   • Benign prostatic hypertrophy    • Coronary artery disease    • Functional diarrhea 2017    Resolved with gluten free diet   • H/O cardiovascular stress test      normal   • Hemorrhoid    • History of COVID-19 2020   • Hyperlipidemia    • Hypertension    • Myocardial infarction (HCC)      2007, distal RCA 80% stenosis, s/p PTCA   • Nephrolithiasis     left kidney   • Obstructive sleep apnea     BIPAP   • Primary insomnia 2016   • Rupture of flexor tendon of hand    • Syncope     2004 neg w/u        Past Surgical History:   Procedure Laterality Date   • CHOLECYSTECTOMY     • COLONOSCOPY      2003, nl, , 2014 , nl, needs C-scope in    • CORONARY STENT PLACEMENT      2007 distal RCA   • EYE SURGERY      JOEY CATARACTS W IOL   • HAND SURGERY Right     THUMB SX   • KNEE ARTHROSCOPY Right        • KNEE ARTHROSCOPY Left 1/15/2019    Procedure: LEFT KNEE  ARTHROSCOPY,  PARTIAL MEDIAL MENISECTOMY;  Surgeon: Mason Ochoa MD;  Location: [facility];  Service: Orthopedics   • TONSILLECTOMY     • TOTAL KNEE ARTHROPLASTY Left 12/17/2021    Procedure: TOTAL KNEE ARTHROPLASTY;  Surgeon: Neal Goodwin MD;  Location: [facility] OR;  Service: Orthopedics;  Laterality: Left;                        PT Assessment/Plan     Row Name 02/09/22 1613          PT Assessment    Assessment Comments Chuck returns to therapy, reports mild increase in pain since pushing flexion over last several days. Pt. expressed desire to possibly trial aqautic therapy in coming weeks prior, pt. with well-healing incision though isntructed pt. to discuss with MD at next follow up. Pt. continues with limitations into knee flexion, cueing with step ups to increase hip/knee flexion vs. circumduction with improved technique following. Continues with 1/2 revolutions on rec bike at end of session.  -            PT Plan    PT Plan Comments wall squat? small lunge?  -           User Key  (r) = Recorded By, (t) = Taken By, (c) = Cosigned By    Initials Name Provider Type     Ligia Leonard, PT Physical Therapist                   OP Exercises     Row Name 02/09/22 1500             Subjective Comments    Subjective Comments After last time now I have had more pain so I just don't know what to do, my friend said maybe go to the therapy pool?  -              Subjective Pain    Able to rate subjective pain? yes  -      Pre-Treatment Pain Level 3  -              Total Minutes    96965 - PT Therapeutic Exercise Minutes 34  -MH      35582 - PT Manual Therapy Minutes 8  -              Exercise 3    Exercise Name 3 Stair knee flex stretch  -      Cueing 3 Verbal  -      Reps 3 5  -      Time 3 20s  -              Exercise 4    Exercise Name 4 Nustep, L5  -      Cueing 4 Verbal  -      Reps 4 6 min  -      Additional Comments L5  -              Exercise 5    Exercise Name 5 Stair HS  "stretch  -      Cueing 5 Verbal  -      Reps 5 3  -      Time 5 20sec  -              Exercise 6    Exercise Name 6 Stair calf stretch  -      Cueing 6 Verbal; Demo  -      Reps 6 3  -      Time 6 20s  -              Exercise 7    Exercise Name 7 B leg press  -      Cueing 7 Verbal; Demo  -      Sets 7 2  -      Reps 7 10  -      Additional Comments 100#  -              Exercise 10    Exercise Name 10 RCB  -      Cueing 10 Verbal  -      Time 10 5 min  -      Additional Comments at end, rocking  -              Exercise 15    Exercise Name 15 standing HS curl  -      Cueing 15 Verbal; Demo  -      Reps 15 10L  -              Exercise 16    Exercise Name 16 6\" step up  -      Cueing 16 Verbal; Demo  -      Reps 16 10  -      Time 16 cues for hip/knee flex vs. circumduction  -            User Key  (r) = Recorded By, (t) = Taken By, (c) = Cosigned By    Initials Name Provider Type     Ligia Leonard, PT Physical Therapist                         Manual Rx (last 36 hours)     Manual Treatments     Row Name 02/09/22 1500             Total Minutes    47897 - PT Manual Therapy Minutes 8  -              Manual Rx 2    Manual Rx 2 Location Supine knee gapping  -              Manual Rx 5    Manual Rx 5 Location gentle STM to quad, patellar mobs supinf/med/lat  -            User Key  (r) = Recorded By, (t) = Taken By, (c) = Cosigned By    Initials Name Provider Type     Ligia Leonard, PT Physical Therapist                 PT OP Goals     Row Name 02/09/22 1600          PT Short Term Goals    STG Date to Achieve 02/06/22  -     STG 1 Pt will report pain rated 2/10 at worst in order to demonstrate ability to return to normalized ADLs and functional activities.  -     STG 1 Progress Met  -     STG 2 Pt will be independent with initial HEP for symptom management.  -     STG 2 Progress Met  [facility]     STG 3 Pt will demonstrate knee AROM flex to 100 deg in order to " improve ability to perform ADLs.  -     STG 3 Progress Progressing  -            Long Term Goals    LTG Date to Achieve 03/06/22  -     LTG 1 Pt will be independent and compliant with advanced HEP for long term management of symptoms and prevention of future occurrence.  -     LTG 1 Progress Ongoing  -     LTG 2 Pt will reduce level of perceived disability as measured by the KOS-ADL  to 90% in order to improve QOL.  -     LTG 2 Progress Progressing  -     LTG 3 Pt will demonstrate knee AROM 0-115 in order to improve ability to ascend/descend stairs and perform gait tasks.  -     LTG 3 Progress Ongoing; Progressing  -     LTG 4 Pt will demonstrate L knee flex/ext strength to 4+/5 in order to improve endurance with recreational activities.  -     LTG 4 Progress Progressing  -     LTG 5 Pt will demonstrate proper gait pattern without AD in order to reduce stress on affected tissues.  -     LTG 5 Progress Ongoing  -           User Key  (r) = Recorded By, (t) = Taken By, (c) = Cosigned By    Initials Name Provider Type     Ligia Leonard PT Physical Therapist                Therapy Education  Given: Symptoms/condition management, Posture/body mechanics  Program: Reinforced  How Provided: Verbal, Demonstration  Provided to: Patient  Level of Understanding: Verbalized, Demonstrated              Time Calculation:   Start Time: 1531  Stop Time: 1615  Time Calculation (min): 44 min  Total Timed Code Minutes- PT: 42 minute(s)  Timed Charges  68993 - PT Therapeutic Exercise Minutes: 34  15281 - PT Manual Therapy Minutes: 8  Total Minutes  Timed Charges Total Minutes: 42   Total Minutes: 42  Therapy Charges for Today     Code Description Service Date Service Provider Modifiers Qty    60644291827 HC PT MANUAL THERAPY EA 15 MIN 2/9/2022 Ligia Leonard, PT GP 1    13823452618 HC PT THER PROC EA 15 MIN 2/9/2022 Ligia Leonard PT GP 2                    Ligia Leonard PT  2/9/2022

## 2022-02-16 ENCOUNTER — HOSPITAL ENCOUNTER (OUTPATIENT)
Dept: PHYSICAL THERAPY | Facility: HOSPITAL | Age: 77
Setting detail: THERAPIES SERIES
Discharge: HOME | End: 2022-02-16

## 2022-02-16 DIAGNOSIS — M25.562 ACUTE PAIN OF LEFT KNEE: Primary | ICD-10-CM

## 2022-02-16 DIAGNOSIS — R26.2 DIFFICULTY WALKING: ICD-10-CM

## 2022-02-16 PROCEDURE — 97110 THERAPEUTIC EXERCISES: CPT

## 2022-02-16 PROCEDURE — 97140 MANUAL THERAPY 1/> REGIONS: CPT

## 2022-02-16 NOTE — THERAPY TREATMENT NOTE
Outpatient Physical Therapy Ortho Treatment Note  [facility]     Patient Name: Chuck Zheng  : 1945  MRN: [id number]  Today's Date: 2022      Visit Date: 2022    Visit Dx:    ICD-10-CM ICD-9-CM   1. Acute pain of left knee  M25.562 719.46   2. Difficulty walking  R26.2 719.7       Patient Active Problem List   Diagnosis   • Atopic rhinitis   • Benign essential hypertension   • Atherosclerosis of coronary artery   • Impaired fasting blood sugar   • Pure hypercholesterolemia   • Cobalamin deficiency   • Primary insomnia   • Health care maintenance   • Elevated PSA, less than 10 ng/ml   • PLACIDO on CPAP Check Pressure +11.5   • History of myocardial infarction   • Benign prostatic hyperplasia with incomplete bladder emptying   • Fatty liver   • Left anterior shoulder pain   • Primary osteoarthritis of left knee   • S/P knee surgery        Past Medical History:   Diagnosis Date   • Adhesive capsulitis of shoulder    • Arthritis    • Benign non-nodular prostatic hyperplasia 2016   • Benign prostatic hypertrophy    • Coronary artery disease    • Functional diarrhea 2017    Resolved with gluten free diet   • H/O cardiovascular stress test      normal   • Hemorrhoid    • History of COVID-19 2020   • Hyperlipidemia    • Hypertension    • Myocardial infarction (HCC)      2007, distal RCA 80% stenosis, s/p PTCA   • Nephrolithiasis     left kidney   • Obstructive sleep apnea     BIPAP   • Primary insomnia 2016   • Rupture of flexor tendon of hand    • Syncope     2004 neg w/u        Past Surgical History:   Procedure Laterality Date   • CHOLECYSTECTOMY     • COLONOSCOPY      2003, nl, , 2014 , nl, needs C-scope in    • CORONARY STENT PLACEMENT      2007 distal RCA   • EYE SURGERY      JOEY CATARACTS W IOL   • HAND SURGERY Right     THUMB SX   • KNEE ARTHROSCOPY Right        • KNEE ARTHROSCOPY Left 1/15/2019    Procedure: LEFT KNEE  ARTHROSCOPY,  PARTIAL MEDIAL MENISECTOMY;  Surgeon: Mason Ochoa MD;  Location: [facility];  Service: Orthopedics   • TONSILLECTOMY     • TOTAL KNEE ARTHROPLASTY Left 12/17/2021    Procedure: TOTAL KNEE ARTHROPLASTY;  Surgeon: Neal Goodwin MD;  Location: [facility] OR;  Service: Orthopedics;  Laterality: Left;                        PT Assessment/Plan     Row Name 02/16/22 1621          PT Assessment    Assessment Comments Pt with continued low pain and feels that he is making ROM improvements. ROM measured to lacking 4 to 100 deg with PT overpressure. Pt continues with quad guarding during manual stretching and slow progress to date. Pt with slight dec in compensatory patterns with gait and worked on stairs today as pt has been performing recriprocally at home. Pt demonstrates R heel raise with L LE advancement during step up and R pelvic drop with decending per L LE weakness. Pt feels that he is doing well, however reemphasized many compensatory patterns with functional movement patterns. Pt returns to MD tomorrow for follow up and advised to inquire about indications for manipulation as pt demonstrates very slow progression and tolerance to ROM interventions.  -CN            PT Plan    PT Plan Comments Follow up regarding MD visit. Consider wall squat, small lunge next visit.  -CN           User Key  (r) = Recorded By, (t) = Taken By, (c) = Cosigned By    Initials Name Provider Type    CN Laney Russell, PT Physical Therapist                   OP Exercises     Row Name 02/16/22 1500             Subjective Comments    Subjective Comments I have been trying to stretch it and I think it might be making progress. I can go up the stairs normally.  -CN              Subjective Pain    Able to rate subjective pain? yes  -CN      Pre-Treatment Pain Level 0  -CN              Total Minutes    68695 - PT Therapeutic Exercise Minutes 30  -CN      03905 - PT Therapeutic Activity Minutes 5  -CN      83475  "- PT Manual Therapy Minutes 10  -CN              Exercise 3    Exercise Name 3 Stair knee flex stretch  -CN      Cueing 3 Verbal  -CN      Reps 3 5  -CN      Time 3 20s  -CN              Exercise 4    Exercise Name 4 Nustep, L5  -CN      Cueing 4 Verbal  -CN      Reps 4 6 min  -CN              Exercise 5    Exercise Name 5 Stair HS stretch  -CN      Cueing 5 Verbal  -CN      Reps 5 3  -CN      Time 5 20sec  -CN              Exercise 6    Exercise Name 6 Stair calf stretch  -CN      Cueing 6 Verbal; Demo  -CN      Reps 6 3  -CN      Time 6 20s  -CN              Exercise 7    Exercise Name 7 leg press  -CN      Cueing 7 Verbal; Demo  -CN      Sets 7 2  -CN      Reps 7 10  -CN      Additional Comments 100# B 10x; 50# L only 10x  -CN              Exercise 14    Exercise Name 14 Stair training  -CN      Cueing 14 Verbal; Demo  -CN      Time 14 5 min  -CN      Additional Comments cues for dec HR on R while advancing L LE, dec \"reaching\" with R LE while descending  -CN              Exercise 16    Exercise Name 16 6\" step up  -CN      Cueing 16 Verbal; Demo  -CN      Reps 16 10  -CN      Time 16 cues for hip/knee flex vs. circumduction  -CN            User Key  (r) = Recorded By, (t) = Taken By, (c) = Cosigned By    Initials Name Provider Type    Laney Welch, PT Physical Therapist                         Manual Rx (last 36 hours)     Manual Treatments     Row Name 02/16/22 1500             Total Minutes    75775 - PT Manual Therapy Minutes 10  -CN              Manual Rx 2    Manual Rx 2 Location Supine knee gapping  -CN              Manual Rx 4    Manual Rx 4 Location Supine hip/knee flex with cues for HS contraction  -CN              Manual Rx 5    Manual Rx 5 Location gentle STM to quad, patellar mobs supinf/med/lat  -CN            User Key  (r) = Recorded By, (t) = Taken By, (c) = Cosigned By    Initials Name Provider Type    Laney Welch, PT Physical Therapist                 PT OP Goals     " Row Name 02/16/22 1600          PT Short Term Goals    STG Date to Achieve 02/06/22  -CN     STG 1 Pt will report pain rated 2/10 at worst in order to demonstrate ability to return to normalized ADLs and functional activities.  -CN     STG 1 Progress Met  -CN     STG 2 Pt will be independent with initial HEP for symptom management.  -CN     STG 2 Progress Met  -CN     STG 3 Pt will demonstrate knee AROM flex to 100 deg in order to improve ability to perform ADLs.  -CN     STG 3 Progress Progressing  -CN     STG 3 Progress Comments 100 deg with PT overpressure  -CN            Long Term Goals    LTG Date to Achieve 03/06/22  -CN     LTG 1 Pt will be independent and compliant with advanced HEP for long term management of symptoms and prevention of future occurrence.  -CN     LTG 1 Progress Ongoing  -CN     LTG 2 Pt will reduce level of perceived disability as measured by the KOS-ADL  to 90% in order to improve QOL.  -CN     LTG 2 Progress Progressing  -CN     LTG 3 Pt will demonstrate knee AROM 0-115 in order to improve ability to ascend/descend stairs and perform gait tasks.  -CN     LTG 3 Progress Ongoing; Progressing  -CN     LTG 4 Pt will demonstrate L knee flex/ext strength to 4+/5 in order to improve endurance with recreational activities.  -CN     LTG 4 Progress Progressing  -CN     LTG 5 Pt will demonstrate proper gait pattern without AD in order to reduce stress on affected tissues.  -CN     LTG 5 Progress Ongoing  -CN           User Key  (r) = Recorded By, (t) = Taken By, (c) = Cosigned By    Initials Name Provider Type    Laney Welch, PT Physical Therapist                Therapy Education  Given: Symptoms/condition management, Posture/body mechanics  Program: Reinforced  How Provided: Verbal, Demonstration  Provided to: Patient  Level of Understanding: Verbalized, Demonstrated              Time Calculation:   Start Time: 1525  Stop Time: 1610  Time Calculation (min): 45 min  Timed  Charges  44120 - PT Therapeutic Exercise Minutes: 30  37779 - PT Manual Therapy Minutes: 10  11478 - PT Therapeutic Activity Minutes: 5  Total Minutes  Timed Charges Total Minutes: 45   Total Minutes: 45  Therapy Charges for Today     Code Description Service Date Service Provider Modifiers Qty    [id number]  PT THER PROC EA 15 MIN 2/16/2022 Laney Russell, PT GP 2    [id number]  PT MANUAL THERAPY EA 15 MIN 2/16/2022 Laney Russell, PT GP 1                    Laney Russell, PT  2/16/2022

## 2022-02-17 ENCOUNTER — OFFICE VISIT (OUTPATIENT)
Dept: ORTHOPEDIC SURGERY | Facility: CLINIC | Age: 77
End: 2022-02-17

## 2022-02-17 VITALS — BODY MASS INDEX: 25.16 KG/M2 | TEMPERATURE: 96.8 F | HEIGHT: 68 IN | WEIGHT: 166 LBS

## 2022-02-17 DIAGNOSIS — R52 PAIN: Primary | ICD-10-CM

## 2022-02-17 DIAGNOSIS — Z96.652 S/P TKR (TOTAL KNEE REPLACEMENT), LEFT: ICD-10-CM

## 2022-02-17 PROCEDURE — 99024 POSTOP FOLLOW-UP VISIT: CPT | Performed by: NURSE PRACTITIONER

## 2022-02-17 PROCEDURE — 73562 X-RAY EXAM OF KNEE 3: CPT | Performed by: NURSE PRACTITIONER

## 2022-02-17 RX ORDER — CLONAZEPAM 0.5 MG/1
TABLET ORAL
COMMUNITY
Start: 2022-02-11

## 2022-02-17 NOTE — PROGRESS NOTES
Chuck Zheng : 1945 MRN: [id number] DATE: 2022    DIAGNOSIS: 8 week follow up left total knee      SUBJECTIVE:Patient returns today for 8 week follow up of left total knee replacement. Patient reports doing well with no unusual complaints. Appears to be progressing but reports he is still having some difficulty with his range of motion.  Patient states his leg feels stiff and is not where he would like to be.  Patient reports he has little to no pain however and is still doing physical therapy at [facility].    OBJECTIVE:   Exam:. The incision is well healed. No sign of infection. Range of motion is measured at 4 to 100. The calf is soft and nontender with a negative Homans sign. Strength is progressing and the patient is ambulating appropriately.    DIAGNOSTIC STUDIES  Xrays: 3 views of the left knee (AP, lateral, and sunrise) were ordered and reviewed for evaluation of recent knee replacement. They demonstrate a well positioned, well aligned knee replacement without complicating factors noted. In comparison with previous films there has been no change.    ASSESSMENT: 8 week status post left knee replacement.    PLAN: 1) Continue with PT exercises as prescribed, instructed him to notify physical therapy to really work on hamstring stretches as well as flexion exercises.  Also told patient he should be exercising at home on days he does not go to therapy.   2) Follow up in 10 months for annual visit    MAXINE Mcadams  2022

## 2022-02-18 ENCOUNTER — HOSPITAL ENCOUNTER (OUTPATIENT)
Dept: PHYSICAL THERAPY | Facility: HOSPITAL | Age: 77
Setting detail: THERAPIES SERIES
Discharge: HOME | End: 2022-02-18

## 2022-02-18 DIAGNOSIS — M25.562 ACUTE PAIN OF LEFT KNEE: Primary | ICD-10-CM

## 2022-02-18 DIAGNOSIS — R26.2 DIFFICULTY WALKING: ICD-10-CM

## 2022-02-18 PROCEDURE — 97140 MANUAL THERAPY 1/> REGIONS: CPT

## 2022-02-18 PROCEDURE — 97110 THERAPEUTIC EXERCISES: CPT

## 2022-02-18 NOTE — THERAPY TREATMENT NOTE
Outpatient Physical Therapy Ortho Treatment Note  [facility]     Patient Name: Chuck Zheng  : 1945  MRN: [id number]  Today's Date: 2022      Visit Date: 2022    Visit Dx:    ICD-10-CM ICD-9-CM   1. Acute pain of left knee  M25.562 719.46   2. Difficulty walking  R26.2 719.7       Patient Active Problem List   Diagnosis   • Atopic rhinitis   • Benign essential hypertension   • Atherosclerosis of coronary artery   • Impaired fasting blood sugar   • Pure hypercholesterolemia   • Cobalamin deficiency   • Primary insomnia   • Health care maintenance   • Elevated PSA, less than 10 ng/ml   • PLACIDO on CPAP Check Pressure +11.5   • History of myocardial infarction   • Benign prostatic hyperplasia with incomplete bladder emptying   • Fatty liver   • Left anterior shoulder pain   • Primary osteoarthritis of left knee   • S/P knee surgery        Past Medical History:   Diagnosis Date   • Adhesive capsulitis of shoulder    • Arthritis    • Benign non-nodular prostatic hyperplasia 2016   • Benign prostatic hypertrophy    • Coronary artery disease    • Functional diarrhea 2017    Resolved with gluten free diet   • H/O cardiovascular stress test      normal   • Hemorrhoid    • History of COVID-19 2020   • Hyperlipidemia    • Hypertension    • Myocardial infarction (HCC)      2007, distal RCA 80% stenosis, s/p PTCA   • Nephrolithiasis     left kidney   • Obstructive sleep apnea     BIPAP   • Primary insomnia 2016   • Rupture of flexor tendon of hand    • Syncope     2004 neg w/u        Past Surgical History:   Procedure Laterality Date   • CHOLECYSTECTOMY     • COLONOSCOPY      2003, nl, , 2014 , nl, needs C-scope in    • CORONARY STENT PLACEMENT      2007 distal RCA   • EYE SURGERY      JOEY CATARACTS W IOL   • HAND SURGERY Right     THUMB SX   • KNEE ARTHROSCOPY Right        • KNEE ARTHROSCOPY Left 1/15/2019    Procedure: LEFT KNEE  ARTHROSCOPY,  PARTIAL MEDIAL MENISECTOMY;  Surgeon: Mason Ochoa MD;  Location: [facility];  Service: Orthopedics   • TONSILLECTOMY     • TOTAL KNEE ARTHROPLASTY Left 12/17/2021    Procedure: TOTAL KNEE ARTHROPLASTY;  Surgeon: Neal Goodwin MD;  Location: [facility] OR;  Service: Orthopedics;  Laterality: Left;                        PT Assessment/Plan     Row Name 02/18/22 1500          PT Assessment    Assessment Comments Chuck presents to PT with reports he has been cleared to resume bowling and hiking within acceptable moderation, he notes he continues to have limp but feels reassured that manipulation is not needed thus far. Reiterated importance on continuing to challenge ROM at home as well as need to progress range due to patient desire to return to high level activities. Pt. continues to demonstrate significant muscle guarding through manual interventions with ROm measuring to 97 degrees flexion this date.  -            PT Plan    PT Plan Comments progress manual as tolerated?  -           User Key  (r) = Recorded By, (t) = Taken By, (c) = Cosigned By    Initials Name Provider Type     Ligia Leonard, PT Physical Therapist                   OP Exercises     Row Name 02/18/22 1500             Subjective Comments    Subjective Comments Kory cleared me to bowl and hike within reason, he said I am no where needing a manipulation right now because im above 90.  -              Subjective Pain    Able to rate subjective pain? yes  -      Pre-Treatment Pain Level 0  -              Total Minutes    61237 - PT Therapeutic Exercise Minutes 30  -      43578 - PT Manual Therapy Minutes 10  -              Exercise 3    Exercise Name 3 Stair knee flex stretch  -      Cueing 3 Verbal  -      Reps 3 5  -      Time 3 20s  -              Exercise 4    Exercise Name 4 Nustep, L5  -      Cueing 4 Verbal  -      Reps 4 6 min  -      Additional Comments L5  -              Exercise 5  "   Exercise Name 5 Stair HS stretch  -      Cueing 5 Verbal  -      Reps 5 3  -      Time 5 20sec  -              Exercise 6    Exercise Name 6 Stair calf stretch  -      Cueing 6 Verbal; Demo  -      Reps 6 3  -      Time 6 20s  -              Exercise 7    Exercise Name 7 leg press  -      Cueing 7 Verbal; Demo  -      Sets 7 2  -      Reps 7 10  -      Additional Comments 100# B  -              Exercise 16    Exercise Name 16 6\" step up  -      Cueing 16 Verbal; Demo  -      Reps 16 10  -      Time 16 cues for hip/knee flex vs. circumduction  -            User Key  (r) = Recorded By, (t) = Taken By, (c) = Cosigned By    Initials Name Provider Type     Ligia Leonard, PT Physical Therapist                         Manual Rx (last 36 hours)     Manual Treatments     Row Name 02/18/22 1500             Total Minutes    69132 - PT Manual Therapy Minutes 10  -              Manual Rx 2    Manual Rx 2 Location Supine knee gapping  -              Manual Rx 4    Manual Rx 4 Location Supine hip/knee flex with cues for HS contraction  -              Manual Rx 5    Manual Rx 5 Location gentle STM to quad, patellar mobs supinf/med/lat  -            User Key  (r) = Recorded By, (t) = Taken By, (c) = Cosigned By    Initials Name Provider Type     Ligia Leonard, PT Physical Therapist                 PT OP Goals     Row Name 02/18/22 1500          PT Short Term Goals    STG Date to Achieve 02/06/22  -     STG 1 Pt will report pain rated 2/10 at worst in order to demonstrate ability to return to normalized ADLs and functional activities.  -     STG 1 Progress Met  -     STG 2 Pt will be independent with initial [facility] for symptom management.  -     STG 2 Progress Met  [facility]     STG 3 Pt will demonstrate knee AROM flex to 100 deg in order to improve ability to perform ADLs.  -     STG 3 Progress Progressing  -            Long Term Goals    LTG Date to Achieve 03/06/22  -     " LTG 1 Pt will be independent and compliant with advanced HEP for long term management of symptoms and prevention of future occurrence.  -     LTG 1 Progress Ongoing  -     LTG 2 Pt will reduce level of perceived disability as measured by the KOS-ADL  to 90% in order to improve QOL.  -     LTG 2 Progress Progressing  -     LTG 3 Pt will demonstrate knee AROM 0-115 in order to improve ability to ascend/descend stairs and perform gait tasks.  -     LTG 3 Progress Ongoing; Progressing  -     LTG 4 Pt will demonstrate L knee flex/ext strength to 4+/5 in order to improve endurance with recreational activities.  -     LTG 4 Progress Progressing  -     LTG 5 Pt will demonstrate proper gait pattern without AD in order to reduce stress on affected tissues.  -     LTG 5 Progress Ongoing  -           User Key  (r) = Recorded By, (t) = Taken By, (c) = Cosigned By    Initials Name Provider Type    Ligia Perez PT Physical Therapist                Therapy Education  Given: Symptoms/condition management, Posture/body mechanics  Program: Reinforced  How Provided: Demonstration, Verbal  Provided to: Patient  Level of Understanding: Verbalized, Demonstrated              Time Calculation:   Start Time: 1531  Stop Time: 1614  Time Calculation (min): 43 min  Total Timed Code Minutes- PT: 40 minute(s)  Timed Charges  50052 - PT Therapeutic Exercise Minutes: 30  29715 - PT Manual Therapy Minutes: 10  Total Minutes  Timed Charges Total Minutes: 40   Total Minutes: 40  Therapy Charges for Today     Code Description Service Date Service Provider Modifiers Qty    35728668362  PT MANUAL THERAPY EA 15 MIN 2/18/2022 Ligia Leonard, PT GP 1    [id number] HC PT THER PROC EA 15 MIN 2/18/2022 Ligia Leonard PT GP 2                    Ligia Leonard PT  2/18/2022

## 2022-02-23 ENCOUNTER — HOSPITAL ENCOUNTER (OUTPATIENT)
Dept: PHYSICAL THERAPY | Facility: HOSPITAL | Age: 77
Setting detail: THERAPIES SERIES
Discharge: HOME | End: 2022-02-23

## 2022-02-23 DIAGNOSIS — R26.2 DIFFICULTY WALKING: ICD-10-CM

## 2022-02-23 DIAGNOSIS — M25.562 ACUTE PAIN OF LEFT KNEE: Primary | ICD-10-CM

## 2022-02-23 PROCEDURE — 97110 THERAPEUTIC EXERCISES: CPT

## 2022-02-23 PROCEDURE — 97140 MANUAL THERAPY 1/> REGIONS: CPT

## 2022-02-27 NOTE — THERAPY TREATMENT NOTE
Outpatient Physical Therapy Ortho Treatment Note  [facility]     Patient Name: Chuck Zheng  : 1945  MRN: [id number]  Today's Date: 2022      Visit Date: 2022    Visit Dx:    ICD-10-CM ICD-9-CM   1. Acute pain of left knee  M25.562 719.46   2. Difficulty walking  R26.2 719.7       Patient Active Problem List   Diagnosis   • Atopic rhinitis   • Benign essential hypertension   • Atherosclerosis of coronary artery   • Impaired fasting blood sugar   • Pure hypercholesterolemia   • Cobalamin deficiency   • Primary insomnia   • Health care maintenance   • Elevated PSA, less than 10 ng/ml   • PLACIDO on CPAP Check Pressure +11.5   • History of myocardial infarction   • Benign prostatic hyperplasia with incomplete bladder emptying   • Fatty liver   • Left anterior shoulder pain   • Primary osteoarthritis of left knee   • S/P knee surgery        Past Medical History:   Diagnosis Date   • Adhesive capsulitis of shoulder    • Arthritis    • Benign non-nodular prostatic hyperplasia 2016   • Benign prostatic hypertrophy    • Coronary artery disease    • Functional diarrhea 2017    Resolved with gluten free diet   • H/O cardiovascular stress test      normal   • Hemorrhoid    • History of COVID-19 2020   • Hyperlipidemia    • Hypertension    • Myocardial infarction (HCC)      2007, distal RCA 80% stenosis, s/p PTCA   • Nephrolithiasis     left kidney   • Obstructive sleep apnea     BIPAP   • Primary insomnia 2016   • Rupture of flexor tendon of hand    • Syncope     2004 neg w/u        Past Surgical History:   Procedure Laterality Date   • CHOLECYSTECTOMY     • COLONOSCOPY      2003, nl, , 2014 , nl, needs C-scope in    • CORONARY STENT PLACEMENT      2007 distal RCA   • EYE SURGERY      JOEY CATARACTS W IOL   • HAND SURGERY Right     THUMB SX   • KNEE ARTHROSCOPY Right        • KNEE ARTHROSCOPY Left 1/15/2019    Procedure: LEFT KNEE  ARTHROSCOPY,  PARTIAL MEDIAL MENISECTOMY;  Surgeon: Mason Ochoa MD;  Location[facility];  Service: Orthopedics   • TONSILLECTOMY     • TOTAL KNEE ARTHROPLASTY Left 12/17/2021    Procedure: TOTAL KNEE ARTHROPLASTY;  Surgeon: Neal Goodwin MD;  Location: [facility] OR;  Service: Orthopedics;  Laterality: Left;                        PT Assessment/Plan     Row Name 02/23/22 1600          PT Assessment    Assessment Comments Pt with continued low pain levels and progressed strengthening and knee ext ROM exercises today. Pt with continued guarding with manual passive stretching for improved knee flexion and again discussed importance of pushing ROM with HEP to reach improve end ROM.  -CN            PT Plan    PT Plan Comments Continue to progress strengthening and ROM as tolerated.  -CN           User Key  (r) = Recorded By, (t) = Taken By, (c) = Cosigned By    Initials Name Provider Type    Laney Welch, PT Physical Therapist                      02/23/22 1500   Subjective Comments   Subjective Comments I feel good, I was a little stiff this morning but I am ok.   Subjective Pain   Able to rate subjective pain? yes   Pre-Treatment Pain Level 0   Total Minutes   99153 - PT Therapeutic Exercise Minutes 30   Exercise 1   Exercise Name 1 STS with touch down onto mat   Cueing 1 Verbal; Demo   Sets 1 2   Reps 1 10   Exercise 2   Exercise Name 2 Seated HS stretch with self overpressure into ext   Cueing 2 Verbal; Demo   Reps 2 10   Time 2 10 sec   Exercise 3   Exercise Name 3 Stair knee flex stretch   Cueing 3 Verbal   Reps 3 5   Time 3 20s   Additional Comments cues for hips forward vs shoulders   Exercise 4   Exercise Name 4 Nustep, L5   Cueing 4 Verbal   Reps 4 6 min   Additional Comments L5   Exercise 5   Exercise Name 5 Stair HS stretch   Cueing 5 Verbal   Reps 5 3   Time 5 20sec   Exercise 7   Exercise Name 7 leg press   Cueing 7 Verbal; Demo   Sets 7 2   Reps 7 10   Additional Comments B  "110#, L=60#   Exercise 8   Exercise Name 8 Prone HS curl with knee off table   Cueing 8 Demo   Sets 8 2   Reps 8 10   Additional Comments 2#   Exercise 16   Exercise Name 16 6\" step up   Cueing 16 Verbal; Demo   Reps 16 10   Time 16 cues for hip/knee flex vs. circumduction             02/23/22 1500   Total Minutes   78996 - PT Manual Therapy Minutes 10   Manual Rx 2   Manual Rx 2 Location Supine knee gapping   Manual Rx 4   Manual Rx 4 Location Supine hip/knee flex with cues for HS contraction   Manual Rx 5   Manual Rx 5 Location gentle STM to quad, patellar mobs supinf/med/lat               PT OP Goals     Row Name 02/23/22 1600          PT Short Term Goals    STG Date to Achieve 02/06/22  -CN     STG 1 Pt will report pain rated 2/10 at worst in order to demonstrate ability to return to normalized ADLs and functional activities.  -CN     STG 1 Progress Met  -CN     STG 2 Pt will be independent with initial HEP for symptom management.  -CN     STG 2 Progress Met  -CN     STG 3 Pt will demonstrate knee AROM flex to 100 deg in order to improve ability to perform ADLs.  -CN     STG 3 Progress Progressing  -CN     STG 3 Progress Comments Slowly progressing with knee ROM.  -CN            Long Term Goals    LTG Date to Achieve 03/06/22  -CN     LTG 1 Pt will be independent and compliant with advanced HEP for long term management of symptoms and prevention of future occurrence.  -CN     LTG 1 Progress Ongoing  -CN     LTG 2 Pt will reduce level of perceived disability as measured by the KOS-ADL  to 90% in order to improve QOL.  -CN     LTG 2 Progress Progressing  -CN     LTG 3 Pt will demonstrate knee AROM 0-115 in order to improve ability to ascend/descend stairs and perform gait tasks.  -CN     LTG 3 Progress Ongoing; Progressing  -CN     LTG 4 Pt will demonstrate L knee flex/ext strength to 4+/5 in order to improve endurance with recreational activities.  -CN     LTG 4 Progress Progressing  -CN     LTG 5 Pt will " demonstrate proper gait pattern without AD in order to reduce stress on affected tissues.  -CN     LTG 5 Progress Ongoing  -CN           User Key  (r) = Recorded By, (t) = Taken By, (c) = Cosigned By    Initials Name Provider Type    Laney Welch, PT Physical Therapist                Therapy Education  Given: Symptoms/condition management, Posture/body mechanics  Program: Reinforced, Progressed  How Provided: Demonstration, Verbal  Provided to: Patient  Level of Understanding: Verbalized, Demonstrated              Time Calculation:   Start Time: 1532  Stop Time: 1612  Time Calculation (min): 40 min  Timed Charges  49037 - PT Therapeutic Exercise Minutes: 30  03442 - PT Manual Therapy Minutes: 10  Total Minutes  Timed Charges Total Minutes: 40   Total Minutes: 40                Laney Russell PT  2/23/2022

## 2022-03-02 ENCOUNTER — HOSPITAL ENCOUNTER (OUTPATIENT)
Dept: PHYSICAL THERAPY | Facility: HOSPITAL | Age: 77
Setting detail: THERAPIES SERIES
Discharge: HOME | End: 2022-03-02

## 2022-03-02 DIAGNOSIS — M25.562 ACUTE PAIN OF LEFT KNEE: Primary | ICD-10-CM

## 2022-03-02 DIAGNOSIS — R26.2 DIFFICULTY WALKING: ICD-10-CM

## 2022-03-02 PROCEDURE — 97110 THERAPEUTIC EXERCISES: CPT

## 2022-03-02 NOTE — THERAPY TREATMENT NOTE
Outpatient Physical Therapy Ortho Treatment Note  [facility]     Patient Name: Chuck Zheng  : 1945  MRN: [id number]  Today's Date: 3/2/2022      Visit Date: 2022    Visit Dx:    ICD-10-CM ICD-9-CM   1. Acute pain of left knee  M25.562 719.46   2. Difficulty walking  R26.2 719.7       Patient Active Problem List   Diagnosis   • Atopic rhinitis   • Benign essential hypertension   • Atherosclerosis of coronary artery   • Impaired fasting blood sugar   • Pure hypercholesterolemia   • Cobalamin deficiency   • Primary insomnia   • Health care maintenance   • Elevated PSA, less than 10 ng/ml   • PLACIDO on CPAP Check Pressure +11.5   • History of myocardial infarction   • Benign prostatic hyperplasia with incomplete bladder emptying   • Fatty liver   • Left anterior shoulder pain   • Primary osteoarthritis of left knee   • S/P knee surgery        Past Medical History:   Diagnosis Date   • Adhesive capsulitis of shoulder    • Arthritis    • Benign non-nodular prostatic hyperplasia 2016   • Benign prostatic hypertrophy    • Coronary artery disease    • Functional diarrhea 2017    Resolved with gluten free diet   • H/O cardiovascular stress test      normal   • Hemorrhoid    • History of COVID-19 2020   • Hyperlipidemia    • Hypertension    • Myocardial infarction (HCC)      2007, distal RCA 80% stenosis, s/p PTCA   • Nephrolithiasis     left kidney   • Obstructive sleep apnea     BIPAP   • Primary insomnia 2016   • Rupture of flexor tendon of hand    • Syncope     2004 neg w/u        Past Surgical History:   Procedure Laterality Date   • CHOLECYSTECTOMY     • COLONOSCOPY      2003, nl, , 2014 , nl, needs C-scope in    • CORONARY STENT PLACEMENT      2007 distal RCA   • EYE SURGERY      JOEY CATARACTS W IOL   • HAND SURGERY Right     THUMB SX   • KNEE ARTHROSCOPY Right        • KNEE ARTHROSCOPY Left 1/15/2019    Procedure: LEFT KNEE  ARTHROSCOPY,  PARTIAL MEDIAL MENISECTOMY;  Surgeon: Mason Ochoa MD;  Location: [facility];  Service: Orthopedics   • TONSILLECTOMY     • TOTAL KNEE ARTHROPLASTY Left 12/17/2021    Procedure: TOTAL KNEE ARTHROPLASTY;  Surgeon: Neal Goodwin MD;  Location: [facility] OR;  Service: Orthopedics;  Laterality: Left;                        PT Assessment/Plan     Row Name 03/02/22 1500          PT Assessment    Assessment Comments Chuck continues to present with low level pain and decreased L knee P/AROM. Continues to guard heavily with manual intervetions, thus transition to exercises based focus. Remains good candidate for PT.  -CC            PT Plan    PT Plan Comments Continue to progress strengthening and ROM as tolerated.  -CC           User Key  (r) = Recorded By, (t) = Taken By, (c) = Cosigned By    Initials Name Provider Type    CC Swati Hsu, PT Physical Therapist                   OP Exercises     Row Name 03/02/22 1400             Subjective Comments    Subjective Comments No new reports  -CC              Total Minutes    98402 - PT Therapeutic Exercise Minutes 35  -CC      12314 - PT Manual Therapy Minutes 3  -CC              Exercise 1    Exercise Name 1 STS with touch down onto mat  -CC      Cueing 1 Verbal; Demo  -CC      Sets 1 2  -CC      Reps 1 10  -CC              Exercise 2    Exercise Name 2 Seated HS stretch with self overpressure into ext  -CC      Cueing 2 Verbal; Demo  -CC      Reps 2 10  -CC      Time 2 10 sec  -CC              Exercise 3    Exercise Name 3 Stair knee flex stretch  -CC      Cueing 3 Verbal  -CC      Reps 3 7  -CC      Time 3 20s  -CC      Additional Comments with therapist overpressure with mob belt  -CC              Exercise 4    Exercise Name 4 Nustep, L5  -CC      Cueing 4 Verbal  -CC      Reps 4 6 min  -CC      Additional Comments L5  -CC              Exercise 5    Exercise Name 5 Stair HS stretch  -CC      Cueing 5 Verbal  -CC      Reps 5 3  -CC       Time 5 20sec  -CC              Exercise 8    Exercise Name 8 Prone HS curl with knee off table  -CC      Cueing 8 Demo  -CC      Sets 8 2  -CC      Reps 8 10  -CC      Time 8 2#  -CC      Additional Comments cues for active movement of L LE, no use of assist for R LE  -CC              Exercise 9    Exercise Name 9 wall slide  -CC      Cueing 9 Verbal  -CC      Reps 9 10  -CC      Time 9 10 sec  -CC              Exercise 11    Exercise Name 11 prone quad str  -CC      Cueing 11 Verbal  -CC      Reps 11 5  -CC      Time 11 20 sec  -CC      Additional Comments strap  -CC            User Key  (r) = Recorded By, (t) = Taken By, (c) = Cosigned By    Initials Name Provider Type    Swati Buitrago PT Physical Therapist                         Manual Rx (last 36 hours)     Manual Treatments     Row Name 03/02/22 1400             Total Minutes    78577 - PT Manual Therapy Minutes 3  -CC              Manual Rx 2    Manual Rx 2 Location Supine knee gapping  -CC            User Key  (r) = Recorded By, (t) = Taken By, (c) = Cosigned By    Initials Name Provider Type    Swati Buitrago PT Physical Therapist                                   Time Calculation:   Start Time: 1447  Stop Time: 1525  Time Calculation (min): 38 min  Total Timed Code Minutes- PT: 38 minute(s)  Timed Charges  37782 - PT Therapeutic Exercise Minutes: 35  00115 - PT Manual Therapy Minutes: 3  Total Minutes  Timed Charges Total Minutes: 38   Total Minutes: 38  Therapy Charges for Today     Code Description Service Date Service Provider Modifiers Qty    [id number] HC PT THER PROC EA 15 MIN 3/2/2022 Swati Hsu, PT GP 3                    Swati Hsu PT  3/2/2022

## 2022-03-07 ENCOUNTER — HOSPITAL ENCOUNTER (OUTPATIENT)
Dept: PHYSICAL THERAPY | Facility: HOSPITAL | Age: 77
Setting detail: THERAPIES SERIES
Discharge: HOME | End: 2022-03-07

## 2022-03-07 DIAGNOSIS — R26.2 DIFFICULTY WALKING: ICD-10-CM

## 2022-03-07 DIAGNOSIS — M25.562 ACUTE PAIN OF LEFT KNEE: Primary | ICD-10-CM

## 2022-03-07 PROCEDURE — 97110 THERAPEUTIC EXERCISES: CPT

## 2022-03-07 NOTE — THERAPY PROGRESS REPORT/RE-CERT
Outpatient Physical Therapy Ortho Re-Assessment  [facility]     Patient Name: Chuck Zheng  : 1945  MRN: [id number]  Today's Date: 3/7/2022      Visit Date: 2022    Patient Active Problem List   Diagnosis   • Atopic rhinitis   • Benign essential hypertension   • Atherosclerosis of coronary artery   • Impaired fasting blood sugar   • Pure hypercholesterolemia   • Cobalamin deficiency   • Primary insomnia   • Health care maintenance   • Elevated PSA, less than 10 ng/ml   • PLACIDO on CPAP Check Pressure +11.5   • History of myocardial infarction   • Benign prostatic hyperplasia with incomplete bladder emptying   • Fatty liver   • Left anterior shoulder pain   • Primary osteoarthritis of left knee   • S/P knee surgery        Past Medical History:   Diagnosis Date   • Adhesive capsulitis of shoulder    • Arthritis    • Benign non-nodular prostatic hyperplasia 2016   • Benign prostatic hypertrophy    • Coronary artery disease    • Functional diarrhea 2017    Resolved with gluten free diet   • H/O cardiovascular stress test      normal   • Hemorrhoid    • History of COVID-19 2020   • Hyperlipidemia    • Hypertension    • Myocardial infarction (HCC)      2007, distal RCA 80% stenosis, s/p PTCA   • Nephrolithiasis     left kidney   • Obstructive sleep apnea     BIPAP   • Primary insomnia 2016   • Rupture of flexor tendon of hand    • Syncope     2004 neg w/u        Past Surgical History:   Procedure Laterality Date   • CHOLECYSTECTOMY     • COLONOSCOPY      2003, nl, , 2014 , nl, needs C-scope in    • CORONARY STENT PLACEMENT      2007 distal RCA   • EYE SURGERY      JOEY CATARACTS W IOL   • HAND SURGERY Right     THUMB SX   • KNEE ARTHROSCOPY Right        • KNEE ARTHROSCOPY Left 1/15/2019    Procedure: LEFT KNEE ARTHROSCOPY,  PARTIAL MEDIAL MENISECTOMY;  Surgeon: Mason Ochao MD;  [place] [facility] [place];  Service: Orthopedics   •  TONSILLECTOMY     • TOTAL KNEE ARTHROPLASTY Left 12/17/2021    Procedure: TOTAL KNEE ARTHROPLASTY;  Surgeon: Neal Goodwin MD;  Location: [facility] OR;  Service: Orthopedics;  Laterality: Left;       Visit Dx:     ICD-10-CM ICD-9-CM   1. Acute pain of left knee  M25.562 719.46   2. Difficulty walking  R26.2 719.7              PT Ortho     Row Name 03/07/22 1200       Myotomal Screen- Lower Quarter Clearing    Knee extension (L3) 4+ (Good +)  -CN    Knee flexion (S2) 4 (Good)  -CN       Left Lower Ext    Lt Knee Extension/Flexion AROM 2-100  -CN          User Key  (r) = Recorded By, (t) = Taken By, (c) = Cosigned By    Initials Name Provider Type    Laney Welch, PT Physical Therapist                            Therapy Education  Given: Symptoms/condition management, Posture/body mechanics  Program: Reinforced, Progressed  How Provided: Demonstration, Verbal  Provided to: Patient  Level of Understanding: Verbalized, Demonstrated      PT OP Goals     Row Name 03/07/22 1200          PT Short Term Goals    STG Date to Achieve 02/06/22  -CN     STG 1 Pt will report pain rated 2/10 at worst in order to demonstrate ability to return to normalized ADLs and functional activities.  -CN     STG 1 Progress Met  -CN     STG 2 Pt will be independent with initial HEP for symptom management.  -CN     STG 2 Progress Met  -CN     STG 3 Pt will demonstrate knee AROM flex to 100 deg in order to improve ability to perform ADLs.  -CN     STG 3 Progress Progressing  -CN     STG 3 Progress Comments PROM to 100 deg.  -CN            Long Term Goals    LTG Date to Achieve 03/06/22  -CN     LTG 1 Pt will be independent and compliant with advanced HEP for long term management of symptoms and prevention of future occurrence.  -CN     LTG 1 Progress Progressing  -CN     LTG 1 Progress Comments Pt reports compliance with HEP, progressing as appropriate.  -CN     LTG 2 Pt will reduce level of perceived disability as measured by the  KOS-ADL  to 90% in order to improve QOL.  -CN     LTG 2 Progress Progressing  -CN     LTG 2 Progress Comments Pt scored 80% where 100% is no disability.  -CN     LTG 3 Pt will demonstrate knee AROM 0-115 in order to improve ability to ascend/descend stairs and perform gait tasks.  -CN     LTG 3 Progress Ongoing  -CN     LTG 4 Pt will demonstrate L knee flex/ext strength to 4+/5 in order to improve endurance with recreational activities.  -CN     LTG 4 Progress Progressing  -CN     LTG 4 Progress Comments Knee ext strength rated 4+/5, knee flex rated 4/5.  -CN     LTG 5 Pt will demonstrate proper gait pattern without AD in order to reduce stress on affected tissues.  -CN     LTG 5 Progress Ongoing  -CN     LTG 5 Progress Comments Pt is not using AD, however much stiffness noted with gait. Dec TKE with heel strike and dec knee flex with swing through.  -CN           User Key  (r) = Recorded By, (t) = Taken By, (c) = Cosigned By    Initials Name Provider Type    Laney Welch, PT Physical Therapist                 PT Assessment/Plan     Row Name 03/07/22 1346          PT Assessment    Functional Limitations Decreased safety during functional activities;Impaired gait;Impaired locomotion;Limitation in home management;Limitations in community activities;Limitations in functional capacity and performance;Performance in leisure activities;Performance in self-care ADL;Performance in work activities  -CN     Impairments Balance;Edema;Gait;Endurance;Impaired flexibility;Muscle strength;Pain;Posture;Range of motion  -CN     Assessment Comments Chuck REYES Marce has been seen for 16 physical therapy sessions for s/p L TKA on 12/17/21.  Treatment has included therapeutic exercise, manual therapy and patient education with home exercise program . Progress to physical therapy goals is fair. Pt has met 2/3 STG and progressing toward 5 LTG. Pt with low pain levels overall, taking Advil prn following return to work. Pt  notes slight increase in swelling at times and icing several times per day. Pt compliant with HEP and notes continued stiffness through knee, especially following sitting for any length of time. Pt with continued decreased A/PROM into flex affecting his gait pattern. Pt with poor tolerance to manual stretching and minimal progress with knee flex ROM despite manual intervention and exercise. Pt with significant muscle guarding with manual ROM preventing end range stretching. Otherwise, pt pleased with progress and returning to recreational walking. He will benefit from continued skilled physical therapy to address remaining impairments and functional limitations.  -CN     Please refer to paper survey for additional self-reported information Yes  -CN     Rehab Potential Good  -CN     Patient/caregiver participated in establishment of treatment plan and goals Yes  -CN     Patient would benefit from skilled therapy intervention Yes  -CN            PT Plan    PT Frequency 2x/week  -CN     Predicted Duration of Therapy Intervention (PT) 4-6 visits  -CN     PT Plan Comments Continue with functional strengthening, work on gait pattern, ROM as tolerated.  -CN           User Key  (r) = Recorded By, (t) = Taken By, (c) = Cosigned By    Initials Name Provider Type    Laney Welch, PT Physical Therapist                   OP Exercises     Row Name 03/07/22 1200 03/07/22 1100          Subjective Comments    Subjective Comments I feel good except for some stiffness, especially when I get up after I've been sitting.  -CN --            Subjective Pain    Able to rate subjective pain? yes  -CN --     Pre-Treatment Pain Level 0  -CN --            Total Minutes    04720 - PT Therapeutic Exercise Minutes 40  -CN --     57114 - PT Manual Therapy Minutes -- --  -CN            Exercise 1    Exercise Name 1 STS with touch down onto mat  -CN --     Cueing 1 Verbal;Demo  -CN --     Sets 1 2  -CN --     Reps 1 10  -CN --             Exercise 2    Exercise Name 2 Seated HS stretch with self overpressure into ext  -CN --     Cueing 2 Verbal;Demo  -CN --     Reps 2 10  -CN --     Time 2 10 sec  -CN --            Exercise 3    Exercise Name 3 Stair knee flex stretch  -CN --     Cueing 3 Verbal  -CN --     Reps 3 5  -CN --     Time 3 20 sec  -CN --            Exercise 4    Exercise Name 4 Nustep, L5  -CN --     Cueing 4 Verbal  -CN --     Reps 4 5 sec  -CN --     Additional Comments L5  -CN --            Exercise 5    Exercise Name 5 Stair HS stretch  -CN --     Cueing 5 Verbal  -CN --     Reps 5 3  -CN --     Time 5 20sec  -CN --            Exercise 7    Exercise Name 7 leg press  -CN --     Cueing 7 Verbal;Demo  -CN --     Sets 7 2  -CN --     Reps 7 10  -CN --     Additional Comments B 120#, L=65#  -CN --            Exercise 8    Exercise Name 8 Prone HS curl with knee off table  -CN --     Cueing 8 Demo  -CN --     Sets 8 2  -CN --     Reps 8 10  -CN --     Time 8 2#  -CN --            Exercise 9    Exercise Name 9 wall slide  -CN --     Cueing 9 Verbal  -CN --     Reps 9 10  -CN --     Time 9 10 sec  -CN --            Exercise 11    Exercise Name 11 prone quad str  -CN --     Cueing 11 Verbal  -CN --     Reps 11 5  -CN --     Time 11 20 sec  -CN --     Additional Comments strap  -CN --           User Key  (r) = Recorded By, (t) = Taken By, (c) = Cosigned By    Initials Name Provider Type    Laney Welch PT Physical Therapist              Manual Rx (last 36 hours)     Manual Treatments     Row Name 03/07/22 1100             Total Minutes    27886 - PT Manual Therapy Minutes --  -CN              Manual Rx 2    Manual Rx 2 Location --  -CN            User Key  (r) = Recorded By, (t) = Taken By, (c) = Cosigned By    Initials Name Provider Type    Laney Welch PT Physical Therapist                            Outcome Measure Options: Knee Outcome Score- ADL  Knee Outcome Score  Knee Outcome Score Comments: 80% where  100% is no disability      Time Calculation:     Start Time: 1217  Stop Time: 1300  Time Calculation (min): 43 min  Timed Charges  67540 - PT Therapeutic Exercise Minutes: 40  Total Minutes  Timed Charges Total Minutes: 40   Total Minutes: 40     Therapy Charges for Today     Code Description Service Date Service Provider Modifiers Qty    32165687549 HC PT THER PROC EA 15 MIN 3/7/2022 Laney Russell, PT GP 3          PT G-Codes  Outcome Measure Options: Knee Outcome Score- ADL         Laney Russell, PT  3/7/2022

## 2022-03-09 ENCOUNTER — HOSPITAL ENCOUNTER (OUTPATIENT)
Dept: PHYSICAL THERAPY | Facility: HOSPITAL | Age: 77
Setting detail: THERAPIES SERIES
Discharge: HOME | End: 2022-03-09

## 2022-03-09 DIAGNOSIS — M25.562 ACUTE PAIN OF LEFT KNEE: Primary | ICD-10-CM

## 2022-03-09 DIAGNOSIS — R26.2 DIFFICULTY WALKING: ICD-10-CM

## 2022-03-09 PROCEDURE — 97110 THERAPEUTIC EXERCISES: CPT

## 2022-03-09 NOTE — THERAPY TREATMENT NOTE
Outpatient Physical Therapy Ortho Treatment Note  [facility]     Patient Name: Chuck Zheng  : 1945  MRN: [id number]  Today's Date: 3/9/2022      Visit Date: 2022    Visit Dx:    ICD-10-CM ICD-9-CM   1. Acute pain of left knee  M25.562 719.46   2. Difficulty walking  R26.2 719.7       Patient Active Problem List   Diagnosis   • Atopic rhinitis   • Benign essential hypertension   • Atherosclerosis of coronary artery   • Impaired fasting blood sugar   • Pure hypercholesterolemia   • Cobalamin deficiency   • Primary insomnia   • Health care maintenance   • Elevated PSA, less than 10 ng/ml   • PLACIDO on CPAP Check Pressure +11.5   • History of myocardial infarction   • Benign prostatic hyperplasia with incomplete bladder emptying   • Fatty liver   • Left anterior shoulder pain   • Primary osteoarthritis of left knee   • S/P knee surgery        Past Medical History:   Diagnosis Date   • Adhesive capsulitis of shoulder    • Arthritis    • Benign non-nodular prostatic hyperplasia 2016   • Benign prostatic hypertrophy    • Coronary artery disease    • Functional diarrhea 2017    Resolved with gluten free diet   • H/O cardiovascular stress test      normal   • Hemorrhoid    • History of COVID-19 2020   • Hyperlipidemia    • Hypertension    • Myocardial infarction (HCC)      2007, distal RCA 80% stenosis, s/p PTCA   • Nephrolithiasis     left kidney   • Obstructive sleep apnea     BIPAP   • Primary insomnia 2016   • Rupture of flexor tendon of hand    • Syncope     2004 neg w/u        Past Surgical History:   Procedure Laterality Date   • CHOLECYSTECTOMY     • COLONOSCOPY      2003, nl, , 2014 , nl, needs C-scope in    • CORONARY STENT PLACEMENT      2007 distal RCA   • EYE SURGERY      JOEY CATARACTS W IOL   • HAND SURGERY Right     THUMB SX   • KNEE ARTHROSCOPY Right        • KNEE ARTHROSCOPY Left 1/15/2019    Procedure: LEFT KNEE  ARTHROSCOPY,  PARTIAL MEDIAL MENISECTOMY;  Surgeon: Mason Ochoa MD;  Location: [facility];  Service: Orthopedics   • TONSILLECTOMY     • TOTAL KNEE ARTHROPLASTY Left 12/17/2021    Procedure: TOTAL KNEE ARTHROPLASTY;  Surgeon: Neal Goodwin MD;  Location: [facility] MAIN OR;  Service: Orthopedics;  Laterality: Left;                        PT Assessment/Plan     Row Name 03/09/22 1500          PT Assessment    Assessment Comments Mr. Zheng returns to clinic noting continued stiffness with varying pain depending on day. Gait remains limited secondary to deficits in ROM particularly with knee flexion during swing phase. Increased resistance on prone HS curls, leg press, and transitioned to chair for STS with tap vs. mat to increase depth with added challenge. Pt. will continue to benefit from skilled PT.  -            PT Plan    PT Plan Comments resume step ups  -           User Key  (r) = Recorded By, (t) = Taken By, (c) = Cosigned By    Initials Name Provider Type     Ligia Leonard, PT Physical Therapist                   OP Exercises     Row Name 03/09/22 1500             Subjective Comments    Subjective Comments I have some good days and some bad days, today feels pretty stiff. 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  -              Subjective Pain    Able to rate subjective pain? yes  -      Pre-Treatment Pain Level 0  -              Total Minutes    42267 - PT Therapeutic Exercise Minutes 40  -              Exercise 1    Exercise Name 1 STS with touch down onto chair  -      Cueing 1 Verbal;Demo  -      Reps 1 10  -              Exercise 2    Exercise Name 2 Seated HS stretch with self overpressure into ext  -      Cueing 2 Verbal;Demo  -      Reps 2 10  -      Time 2 10 sec  -              Exercise 3    Exercise Name 3 Stair knee flex stretch  -      Cueing 3 Verbal  -      Reps 3 5  -      Time 3 20 sec  -               Exercise 4    Exercise Name 4 Nustep, L5  -      Cueing 4 Verbal  -      Time 4 5 min  -MH      Additional Comments L5  -MH              Exercise 5    Exercise Name 5 Stair HS stretch  -MH      Cueing 5 Verbal  -MH      Reps 5 3  -MH      Time 5 20sec  -MH              Exercise 7    Exercise Name 7 leg press  -MH      Cueing 7 Verbal;Demo  -      Sets 7 2  -MH      Reps 7 10  -MH      Additional Comments B 130#, R: 70#  -MH              Exercise 8    Exercise Name 8 Prone HS curl with knee off table  -MH      Cueing 8 Verbal  -MH      Sets 8 2  -MH      Reps 8 10  -MH      Time 8 4#  -MH              Exercise 9    Exercise Name 9 wall slide  -      Cueing 9 Verbal  -MH      Reps 9 10  -MH      Time 9 10sec  -MH              Exercise 11    Exercise Name 11 prone quad str  -      Cueing 11 Verbal  -MH      Reps 11 3  -MH      Time 11 20 sec  -MH      Additional Comments strap  -            User Key  (r) = Recorded By, (t) = Taken By, (c) = Cosigned By    Initials Name Provider Type    Ligia Perez, PT Physical Therapist                              PT OP Goals     Row Name 03/09/22 1500          PT Short Term Goals    STG Date to Achieve 02/06/22  -     STG 1 Pt will report pain rated 2/10 at worst in order to demonstrate ability to return to normalized ADLs and functional activities.  -     STG 1 Progress Met  -     STG 2 Pt will be independent with initial HEP for symptom management.  -     STG 2 Progress Met  [facility]     STG 3 Pt will demonstrate knee AROM flex to 100 deg in order to improve ability to perform ADLs.  -     STG 3 Progress Progressing  -            Long Term Goals    LTG Date to Achieve 03/06/22  -     LTG 1 Pt will be independent and compliant with advanced HEP for long term management of symptoms and prevention of future occurrence.  -     LTG 1 Progress Progressing  -     LTG 2 Pt will reduce level of perceived disability as measured by the KOS-ADL  to 90% in order  to improve QOL.  -     LTG 2 Progress Progressing  -     LTG 3 Pt will demonstrate knee AROM 0-115 in order to improve ability to ascend/descend stairs and perform gait tasks.  -     LTG 3 Progress Ongoing  -     LTG 4 Pt will demonstrate L knee flex/ext strength to 4+/5 in order to improve endurance with recreational activities.  -     LTG 4 Progress Progressing  -     LTG 5 Pt will demonstrate proper gait pattern without AD in order to reduce stress on affected tissues.  -     LTG 5 Progress Ongoing  -           User Key  (r) = Recorded By, (t) = Taken By, (c) = Cosigned By    Initials Name Provider Type     Ligia Leonard, PT Physical Therapist                Therapy Education  Given: HEP, Symptoms/condition management  Program: Reinforced  How Provided: Verbal, Demonstration  Provided to: Patient  Level of Understanding: Verbalized, Demonstrated              Time Calculation:   Start Time: 1535  Stop Time: 1615  Time Calculation (min): 40 min  Total Timed Code Minutes- PT: 40 minute(s)  Timed Charges  37594 - PT Therapeutic Exercise Minutes: 40  Total Minutes  Timed Charges Total Minutes: 40   Total Minutes: 40  Therapy Charges for Today     Code Description Service Date Service Provider Modifiers Qty    88550221073 HC PT THER PROC EA 15 MIN 3/9/2022 Ligia Leonard, PT GP 3                    Ligia Leonard PT  3/9/2022

## 2022-03-14 ENCOUNTER — HOSPITAL ENCOUNTER (OUTPATIENT)
Dept: PHYSICAL THERAPY | Facility: HOSPITAL | Age: 77
Setting detail: THERAPIES SERIES
Discharge: HOME | End: 2022-03-14

## 2022-03-14 DIAGNOSIS — R26.2 DIFFICULTY WALKING: ICD-10-CM

## 2022-03-14 DIAGNOSIS — M25.562 ACUTE PAIN OF LEFT KNEE: Primary | ICD-10-CM

## 2022-03-14 PROCEDURE — 97110 THERAPEUTIC EXERCISES: CPT

## 2022-03-14 PROCEDURE — 97140 MANUAL THERAPY 1/> REGIONS: CPT

## 2022-03-14 NOTE — THERAPY TREATMENT NOTE
Outpatient Physical Therapy Ortho Treatment Note  [facility]     Patient Name: Chuck Zheng  : 1945  MRN: [id number]  Today's Date: 3/14/2022      Visit Date: 2022    Visit Dx:    ICD-10-CM ICD-9-CM   1. Acute pain of left knee  M25.562 719.46   2. Difficulty walking  R26.2 719.7       Patient Active Problem List   Diagnosis   • Atopic rhinitis   • Benign essential hypertension   • Atherosclerosis of coronary artery   • Impaired fasting blood sugar   • Pure hypercholesterolemia   • Cobalamin deficiency   • Primary insomnia   • Health care maintenance   • Elevated PSA, less than 10 ng/ml   • PLACIDO on CPAP Check Pressure +11.5   • History of myocardial infarction   • Benign prostatic hyperplasia with incomplete bladder emptying   • Fatty liver   • Left anterior shoulder pain   • Primary osteoarthritis of left knee   • S/P knee surgery        Past Medical History:   Diagnosis Date   • Adhesive capsulitis of shoulder    • Arthritis    • Benign non-nodular prostatic hyperplasia 2016   • Benign prostatic hypertrophy    • Coronary artery disease    • Functional diarrhea 2017    Resolved with gluten free diet   • H/O cardiovascular stress test      normal   • Hemorrhoid    • History of COVID-19 2020   • Hyperlipidemia    • Hypertension    • Myocardial infarction (HCC)      2007, distal RCA 80% stenosis, s/p PTCA   • Nephrolithiasis     left kidney   • Obstructive sleep apnea     BIPAP   • Primary insomnia 2016   • Rupture of flexor tendon of hand    • Syncope     2004 neg w/u        Past Surgical History:   Procedure Laterality Date   • CHOLECYSTECTOMY     • COLONOSCOPY      2003, nl, , 2014 , nl, needs C-scope in    • CORONARY STENT PLACEMENT      2007 distal RCA   • EYE SURGERY      JOEY CATARACTS W IOL   • HAND SURGERY Right     THUMB SX   • KNEE ARTHROSCOPY Right        • KNEE ARTHROSCOPY Left 1/15/2019    Procedure: LEFT KNEE  ARTHROSCOPY,  PARTIAL MEDIAL MENISECTOMY;  Surgeon: Mason Ochoa MD;  Location: [facility];  Service: Orthopedics   • TONSILLECTOMY     • TOTAL KNEE ARTHROPLASTY Left 12/17/2021    Procedure: TOTAL KNEE ARTHROPLASTY;  Surgeon: Neal Goodwin MD;  Location: [facility] OR;  Service: Orthopedics;  Laterality: Left;                        PT Assessment/Plan     Row Name 03/14/22 1626          PT Assessment    Assessment Comments Pt notes stiffness over the weekend which is improved today. Continued with strengthening exercises with emphasis on regaining ROM. Trialed sustained knee flex with MH and gait belt seated on mat table. Able to achieve 104 deg passive flex with slight hip hike (unable to correct with cues).  -CN            PT Plan    PT Plan Comments Conitnue with passive sustained stretch with MH if beneficial and include modalities to improved ROM as needed.  -CN           User Key  (r) = Recorded By, (t) = Taken By, (c) = Cosigned By    Initials Name Provider Type    Laney Welch, PT Physical Therapist                 Modalities     Row Name 03/14/22 1400             Moist Heat    MH Applied Yes  -CN      Location L quad with passive flex stretch (towel roll under distal femur, gait belt providing stretch around table leg)  -CN      PT Moist Heat Minutes 10  -CN            User Key  (r) = Recorded By, (t) = Taken By, (c) = Cosigned By    Initials Name Provider Type    Laney Welch, PT Physical Therapist               OP Exercises     Row Name 03/14/22 1400 03/14/22 1300          Subjective Comments    Subjective Comments I felt good for about 2 days after last time but was a little stiff over the weekend. It is better now.  -CN --            Subjective Pain    Able to rate subjective pain? yes  -CN --     Pre-Treatment Pain Level 0  -CN --            Total Minutes    68466 - PT Therapeutic Exercise Minutes 35  -CN --     56366 - PT Manual Therapy Minutes -- --  -CN             Exercise 3    Exercise Name 3 Stair knee flex stretch  -CN --     Cueing 3 Verbal  -CN --     Reps 3 5  -CN --     Time 3 20 sec  -CN --            Exercise 4    Exercise Name 4 Nustep, L5  -CN --     Cueing 4 Verbal  -CN --     Time 4 5 min  -CN --     Additional Comments L5  -CN --            Exercise 5    Exercise Name 5 Stair HS stretch  -CN --     Cueing 5 Verbal  -CN --     Reps 5 3  -CN --     Time 5 20sec  -CN --            Exercise 8    Exercise Name 8 Prone HS curl with knee off table  -CN --     Cueing 8 Verbal  -CN --     Sets 8 2  -CN --     Reps 8 10  -CN --     Time 8 4#  -CN --            Exercise 9    Exercise Name 9 wall slide  -CN --     Cueing 9 Verbal  -CN --     Reps 9 10  -CN --     Time 9 10sec  -CN --            Exercise 11    Exercise Name 11 prone quad str  -CN --     Cueing 11 Verbal  -CN --     Reps 11 3  -CN --     Time 11 20 sec  -CN --     Additional Comments strap  -CN --           User Key  (r) = Recorded By, (t) = Taken By, (c) = Cosigned By    Initials Name Provider Type    Laney Welch, PT Physical Therapist                         Manual Rx (last 36 hours)     Manual Treatments     Row Name 03/14/22 1300             Total Minutes    29893 - PT Manual Therapy Minutes --  -CN              Manual Rx 2    Manual Rx 2 Location --  -CN              Manual Rx 3    Manual Rx 3 Location --  -CN            User Key  (r) = Recorded By, (t) = Taken By, (c) = Cosigned By    Initials Name Provider Type    Laney Welch, PT Physical Therapist                 PT OP Goals     Row Name 03/14/22 1600          PT Short Term Goals    STG Date to Achieve 02/06/22  -CN     STG 1 Pt will report pain rated 2/10 at worst in order to demonstrate ability to return to normalized ADLs and functional activities.  -CN     STG 1 Progress Met  -CN     STG 2 Pt will be independent with initial HEP for symptom management.  -CN     STG 2 Progress Met  -CN     STG 3 Pt will  demonstrate knee AROM flex to 100 deg in order to improve ability to perform ADLs.  -CN     STG 3 Progress Progressing  -CN     STG 3 Progress Comments Pt able to achieve 104 deg passive flex with MH and gait belt sustained stretch.  -CN            Long Term Goals    LTG Date to Achieve 03/06/22  -CN     LTG 1 Pt will be independent and compliant with advanced HEP for long term management of symptoms and prevention of future occurrence.  -CN     LTG 1 Progress Progressing  -CN     LTG 2 Pt will reduce level of perceived disability as measured by the KOS-ADL  to 90% in order to improve QOL.  -CN     LTG 2 Progress Progressing  -CN     LTG 3 Pt will demonstrate knee AROM 0-115 in order to improve ability to ascend/descend stairs and perform gait tasks.  -CN     LTG 3 Progress Ongoing  -CN     LTG 4 Pt will demonstrate L knee flex/ext strength to 4+/5 in order to improve endurance with recreational activities.  -CN     LTG 4 Progress Progressing  -CN     LTG 5 Pt will demonstrate proper gait pattern without AD in order to reduce stress on affected tissues.  -CN     LTG 5 Progress Ongoing  -CN           User Key  (r) = Recorded By, (t) = Taken By, (c) = Cosigned By    Initials Name Provider Type    Laney Welch, PT Physical Therapist                Therapy Education  Given: HEP, Symptoms/condition management  Program: Reinforced  How Provided: Verbal, Demonstration  Provided to: Patient  Level of Understanding: Verbalized, Demonstrated              Time Calculation:   Start Time: 1401  Stop Time: 1445  Time Calculation (min): 44 min  Timed Charges  79678 - PT Therapeutic Exercise Minutes: 35  Untimed Charges  PT Moist Heat Minutes: 10  Total Minutes  Timed Charges Total Minutes: 35  Untimed Charges Total Minutes: 10   Total Minutes: 45  Therapy Charges for Today     Code Description Service Date Service Provider Modifiers Qty    74064472435 HC PT THER PROC EA 15 MIN 3/14/2022 Laney Russell, PT GP  2    [id number]  PT HOT OR COLD PACK TREAT MCARE 3/14/2022 Laney Russell, PT GP 1                    Laney Russell, PT  3/14/2022

## 2022-03-16 ENCOUNTER — HOSPITAL ENCOUNTER (OUTPATIENT)
Dept: PHYSICAL THERAPY | Facility: HOSPITAL | Age: 77
Setting detail: THERAPIES SERIES
Discharge: HOME | End: 2022-03-16

## 2022-03-16 DIAGNOSIS — R26.2 DIFFICULTY WALKING: ICD-10-CM

## 2022-03-16 DIAGNOSIS — M25.562 ACUTE PAIN OF LEFT KNEE: Primary | ICD-10-CM

## 2022-03-16 PROCEDURE — 97110 THERAPEUTIC EXERCISES: CPT

## 2022-03-16 NOTE — THERAPY TREATMENT NOTE
Outpatient Physical Therapy Ortho Treatment Note  [facility]     Patient Name: Chuck Zheng  : 1945  MRN: [id number]  Today's Date: 3/16/2022      Visit Date: 2022    Visit Dx:    ICD-10-CM ICD-9-CM   1. Acute pain of left knee  M25.562 719.46   2. Difficulty walking  R26.2 719.7       Patient Active Problem List   Diagnosis   • Atopic rhinitis   • Benign essential hypertension   • Atherosclerosis of coronary artery   • Impaired fasting blood sugar   • Pure hypercholesterolemia   • Cobalamin deficiency   • Primary insomnia   • Health care maintenance   • Elevated PSA, less than 10 ng/ml   • PLACIDO on CPAP Check Pressure +11.5   • History of myocardial infarction   • Benign prostatic hyperplasia with incomplete bladder emptying   • Fatty liver   • Left anterior shoulder pain   • Primary osteoarthritis of left knee   • S/P knee surgery        Past Medical History:   Diagnosis Date   • Adhesive capsulitis of shoulder    • Arthritis    • Benign non-nodular prostatic hyperplasia 2016   • Benign prostatic hypertrophy    • Coronary artery disease    • Functional diarrhea 2017    Resolved with gluten free diet   • H/O cardiovascular stress test      normal   • Hemorrhoid    • History of COVID-19 2020   • Hyperlipidemia    • Hypertension    • Myocardial infarction (HCC)      2007, distal RCA 80% stenosis, s/p PTCA   • Nephrolithiasis     left kidney   • Obstructive sleep apnea     BIPAP   • Primary insomnia 2016   • Rupture of flexor tendon of hand    • Syncope     2004 neg w/u        Past Surgical History:   Procedure Laterality Date   • CHOLECYSTECTOMY     • COLONOSCOPY      2003, nl, , 2014 , nl, needs C-scope in    • CORONARY STENT PLACEMENT      2007 distal RCA   • EYE SURGERY      JOEY CATARACTS W IOL   • HAND SURGERY Right     THUMB SX   • KNEE ARTHROSCOPY Right        • KNEE ARTHROSCOPY Left 1/15/2019    Procedure: LEFT KNEE  ARTHROSCOPY,  PARTIAL MEDIAL MENISECTOMY;  Surgeon: Mason Ochoa MD;  Location: [facility];  Service: Orthopedics   • TONSILLECTOMY     • TOTAL KNEE ARTHROPLASTY Left 12/17/2021    Procedure: TOTAL KNEE ARTHROPLASTY;  Surgeon: Neal Goodwin MD;  Location: [facility] OR;  Service: Orthopedics;  Laterality: Left;                        PT Assessment/Plan     Row Name 03/16/22 1500          PT Assessment    Assessment Comments Mr. Zheng returns to clinic, repotrs he went for 2 mile walk yesterday at slow, steady pace without difficulty. Continues with antalgic gait into clinic with reduced knee flexion during swing phase. Reports good response following last session with feeling improved range, repeated sustained stretch with moist heat with good tolerance. Pt. with difficulty with initial steps following prolonged stretch/range exercise with cueing to improve heel strike. Pt. will continue to benefit from skilled PT.  -            PT Plan    PT Plan Comments contiue to push flexion ROM  -           User Key  (r) = Recorded By, (t) = Taken By, (c) = Cosigned By    Initials Name Provider Type     Ligia Leonard, PT Physical Therapist                 Modalities     Row Name 03/16/22 1500             Moist Heat    MH Applied Yes  -      Location L quad with passive flex stretch (towel roll under distal femur, gait belt providing stretch around table leg)  -      PT Moist Heat Minutes 10  -            User Key  (r) = Recorded By, (t) = Taken By, (c) = Cosigned By    Initials Name Provider Type     Ligia Leonard, PT Physical Therapist               OP Exercises     Row Name 03/16/22 1500             Subjective Comments    Subjective Comments I took a long walk yesterday the first one since surgery, anbout 2 miles. I didn't pass anyone up but it felt good. I took an 800 mg ibuprofen before. It feels good today, last time I got the best numbers I have yet.  -              Subjective Pain     Able to rate subjective pain? yes  -      Pre-Treatment Pain Level 0  -              Total Minutes    56797 - PT Therapeutic Exercise Minutes 35  -MH              Exercise 3    Exercise Name 3 Stair knee flex stretch  -      Cueing 3 Verbal  -MH      Reps 3 5  -MH      Time 3 20sec  -MH              Exercise 4    Exercise Name 4 Nustep, L5  -MH      Cueing 4 Verbal  -      Time 4 5 min  -      Additional Comments L5  -MH              Exercise 5    Exercise Name 5 Stair HS stretch  -MH      Cueing 5 Verbal  -MH      Reps 5 3  -MH      Time 5 20sec  -MH              Exercise 7    Exercise Name 7 leg press  -      Cueing 7 Verbal;Demo  -      Sets 7 2  -MH      Reps 7 10  -MH      Additional Comments B 140#  -MH              Exercise 8    Exercise Name 8 Prone HS curl with knee off table  -      Cueing 8 Verbal  -      Sets 8 2  -MH      Reps 8 10  -MH      Time 8 4#  -MH              Exercise 9    Exercise Name 9 wall slide  -      Cueing 9 Verbal  -MH      Reps 9 15  -MH      Time 9 10sec  -MH              Exercise 11    Exercise Name 11 prone quad str  -      Cueing 11 Verbal  -MH      Reps 11 5  -MH      Time 11 20sec  -MH      Additional Comments strap  -            User Key  (r) = Recorded By, (t) = Taken By, (c) = Cosigned By    Initials Name Provider Type    Ligia Perez, PT Physical Therapist                              PT OP Goals     Row Name 03/16/22 1500          PT Short Term Goals    STG Date to Achieve 02/06/22  -     STG 1 Pt will report pain rated 2/10 at worst in order to demonstrate ability to return to normalized ADLs and functional activities.  -     STG 1 Progress Met  -     STG 2 Pt will be independent with initial [facility] for symptom management.  -     STG 2 Progress Met  -     STG 3 Pt will demonstrate knee AROM flex to 100 deg in order to improve ability to perform ADLs.  -     STG 3 Progress Progressing  -            Long Term Goals    LTG Date to  Achieve 03/06/22  -     LTG 1 Pt will be independent and compliant with advanced HEP for long term management of symptoms and prevention of future occurrence.  -     LTG 1 Progress Progressing  -     LTG 2 Pt will reduce level of perceived disability as measured by the KOS-ADL  to 90% in order to improve QOL.  -     LTG 2 Progress Progressing  -     LTG 3 Pt will demonstrate knee AROM 0-115 in order to improve ability to ascend/descend stairs and perform gait tasks.  -     LTG 3 Progress Ongoing  -     LTG 4 Pt will demonstrate L knee flex/ext strength to 4+/5 in order to improve endurance with recreational activities.  -     LTG 4 Progress Progressing  -     LTG 5 Pt will demonstrate proper gait pattern without AD in order to reduce stress on affected tissues.  -     LTG 5 Progress Ongoing  -           User Key  (r) = Recorded By, (t) = Taken By, (c) = Cosigned By    Initials Name Provider Type     Ligia Leonard PT Physical Therapist                Therapy Education  Given: Symptoms/condition management, Mobility training  Program: Reinforced  How Provided: Verbal, Demonstration  Provided to: Patient  Level of Understanding: Verbalized, Demonstrated              Time Calculation:   Start Time: 1530  Stop Time: 1614  Time Calculation (min): 44 min  Total Timed Code Minutes- PT: 35 minute(s)  Timed Charges  51257 - PT Therapeutic Exercise Minutes: 35  Untimed Charges  PT Moist Heat Minutes: 10  Total Minutes  Timed Charges Total Minutes: 35  Untimed Charges Total Minutes: 10   Total Minutes: 45  Therapy Charges for Today     Code Description Service Date Service Provider Modifiers Qty    70672253753 HC PT THER PROC EA 15 MIN 3/16/2022 Ligia Leonard, PT GP 2    [id number] HC PT HOT OR COLD PACK TREAT [facility]ARE 3/16/2022 Ligia Leonard, PT GP 1                    Ligia Leonard PT  3/16/2022

## 2022-03-21 ENCOUNTER — HOSPITAL ENCOUNTER (OUTPATIENT)
Dept: PHYSICAL THERAPY | Facility: HOSPITAL | Age: 77
Setting detail: THERAPIES SERIES
Discharge: HOME | End: 2022-03-21

## 2022-03-21 DIAGNOSIS — R26.2 DIFFICULTY WALKING: ICD-10-CM

## 2022-03-21 DIAGNOSIS — M25.562 ACUTE PAIN OF LEFT KNEE: Primary | ICD-10-CM

## 2022-03-21 PROCEDURE — 97110 THERAPEUTIC EXERCISES: CPT

## 2022-03-21 NOTE — THERAPY TREATMENT NOTE
Outpatient Physical Therapy Ortho Treatment Note  [facility]     Patient Name: Chuck Zheng  : 1945  MRN: [id number]  Today's Date: 3/21/2022      Visit Date: 2022    Visit Dx:    ICD-10-CM ICD-9-CM   1. Acute pain of left knee  M25.562 719.46   2. Difficulty walking  R26.2 719.7       Patient Active Problem List   Diagnosis   • Atopic rhinitis   • Benign essential hypertension   • Atherosclerosis of coronary artery   • Impaired fasting blood sugar   • Pure hypercholesterolemia   • Cobalamin deficiency   • Primary insomnia   • Health care maintenance   • Elevated PSA, less than 10 ng/ml   • PLACIDO on CPAP Check Pressure +11.5   • History of myocardial infarction   • Benign prostatic hyperplasia with incomplete bladder emptying   • Fatty liver   • Left anterior shoulder pain   • Primary osteoarthritis of left knee   • S/P knee surgery        Past Medical History:   Diagnosis Date   • Adhesive capsulitis of shoulder    • Arthritis    • Benign non-nodular prostatic hyperplasia 2016   • Benign prostatic hypertrophy    • Coronary artery disease    • Functional diarrhea 2017    Resolved with gluten free diet   • H/O cardiovascular stress test      normal   • Hemorrhoid    • History of COVID-19 2020   • Hyperlipidemia    • Hypertension    • Myocardial infarction (HCC)      2007, distal RCA 80% stenosis, s/p PTCA   • Nephrolithiasis     left kidney   • Obstructive sleep apnea     BIPAP   • Primary insomnia 2016   • Rupture of flexor tendon of hand    • Syncope     2004 neg w/u        Past Surgical History:   Procedure Laterality Date   • CHOLECYSTECTOMY     • COLONOSCOPY      2003, nl, , 2014 , nl, needs C-scope in    • CORONARY STENT PLACEMENT      2007 distal RCA   • EYE SURGERY      JOEY CATARACTS W IOL   • HAND SURGERY Right     THUMB SX   • KNEE ARTHROSCOPY Right        • KNEE ARTHROSCOPY Left 1/15/2019    Procedure: LEFT KNEE  ARTHROSCOPY,  PARTIAL MEDIAL MENISECTOMY;  Surgeon: Mason Ochoa MD;  Location: [facility];  Service: Orthopedics   • TONSILLECTOMY     • TOTAL KNEE ARTHROPLASTY Left 12/17/2021    Procedure: TOTAL KNEE ARTHROPLASTY;  Surgeon: Neal Goodwin MD;  [facility] OR;  Service: Orthopedics;  Laterality: Left;                        PT Assessment/Plan     Row Name 03/21/22 1615          PT Assessment    Assessment Comments Pt reports continued stiffness and is observed in lobby with L LE in partially extended position and unequal WBing with rise from chair. Worked on in clinic, however pt limited with performance per knee ROM and inability to weight shift forward to rise without use of UEs. Continued with prolonged stretch with MH and followed with RCB at end of session with ablility to perform full revolution with slight hip hike. Pt slowly progressing and continues with poor tolerance to end range stretching.  -CN            PT Plan    PT Plan Comments Continue with passive stretch and finish with RCB if beneficial.  -CN           User Key  (r) = Recorded By, (t) = Taken By, (c) = Cosigned By    Initials Name Provider Type    Laney Welch, PT Physical Therapist                 Modalities     Row Name 03/21/22 1500             Moist Heat    MH Applied Yes  -CN      Location L quad with passive flex stretch (towel roll under distal femur, gait belt providing stretch around table leg)  -CN      PT Moist Heat Minutes 10  -CN            User Key  (r) = Recorded By, (t) = Taken By, (c) = Cosigned By    Initials Name Provider Type    Laney Welch, KODY Physical Therapist               OP Exercises     Row Name 03/21/22 1500             Subjective Comments    Subjective Comments I am trying to get off of the Advil so I haven't taken any today.  -CN              Subjective Pain    Able to rate subjective pain? yes  -CN      Pre-Treatment Pain Level 0  -CN      Subjective Pain Comment  Its not so much pain as it is stiffness  -CN              Total Minutes    29802 - PT Therapeutic Exercise Minutes 35  -CN              Exercise 1    Exercise Name 1 RCB  -CN      Cueing 1 Verbal  -CN      Time 1 3 min  -CN      Additional Comments at end of session, seat 8  -CN              Exercise 3    Exercise Name 3 Stair knee flex stretch  -CN      Cueing 3 Verbal  -CN      Reps 3 5  -CN      Time 3 20sec  -CN              Exercise 4    Exercise Name 4 Nustep, L5  -CN      Cueing 4 Verbal  -CN      Time 4 5 min  -CN      Additional Comments L5  -CN              Exercise 5    Exercise Name 5 Stair HS stretch  -CN      Cueing 5 Verbal  -CN      Reps 5 3  -CN      Time 5 20sec  -CN              Exercise 7    Exercise Name 7 leg press  -CN      Cueing 7 Verbal;Demo  -CN      Sets 7 2  -CN      Reps 7 10  -CN      Additional Comments B 140#  -CN              Exercise 9    Exercise Name 9 wall slide  -CN      Cueing 9 Verbal  -CN      Reps 9 15  -CN      Time 9 10sec  -CN            User Key  (r) = Recorded By, (t) = Taken By, (c) = Cosigned By    Initials Name Provider Type    Laney Welch, PT Physical Therapist                              PT OP Goals     Row Name 03/21/22 1600          PT Short Term Goals    STG Date to Achieve 02/06/22  -CN     STG 1 Pt will report pain rated 2/10 at worst in order to demonstrate ability to return to normalized ADLs and functional activities.  -CN     STG 1 Progress Met  -CN     STG 2 Pt will be independent with initial HEP for symptom management.  -CN     STG 2 Progress Met  -CN     STG 3 Pt will demonstrate knee AROM flex to 100 deg in order to improve ability to perform ADLs.  -CN     STG 3 Progress Progressing  -CN            Long Term Goals    LTG Date to Achieve 03/06/22  -CN     LTG 1 Pt will be independent and compliant with advanced HEP for long term management of symptoms and prevention of future occurrence.  -CN     LTG 1 Progress Progressing  -CN      LTG 1 Progress Comments Added RCB at end of session for improved mobility.  -CN     LTG 2 Pt will reduce level of perceived disability as measured by the KOS-ADL  to 90% in order to improve QOL.  -CN     LTG 2 Progress Progressing  -CN     LTG 3 Pt will demonstrate knee AROM 0-115 in order to improve ability to ascend/descend stairs and perform gait tasks.  -CN     LTG 3 Progress Ongoing  -CN     LTG 4 Pt will demonstrate L knee flex/ext strength to 4+/5 in order to improve endurance with recreational activities.  -CN     LTG 4 Progress Progressing  -CN     LTG 5 Pt will demonstrate proper gait pattern without AD in order to reduce stress on affected tissues.  -CN     LTG 5 Progress Ongoing  -CN           User Key  (r) = Recorded By, (t) = Taken By, (c) = Cosigned By    Initials Name Provider Type    Laney Welch, PT Physical Therapist                Therapy Education  Given: Symptoms/condition management, Mobility training  Program: Reinforced  How Provided: Verbal, Demonstration  Provided to: Patient  Level of Understanding: Verbalized, Demonstrated              Time Calculation:   Start Time: 1530  Stop Time: 1615  Time Calculation (min): 45 min  Timed Charges  75650 - PT Therapeutic Exercise Minutes: 35  Untimed Charges  PT Moist Heat Minutes: 10  Total Minutes  Timed Charges Total Minutes: 35  Untimed Charges Total Minutes: 10   Total Minutes: 45  Therapy Charges for Today     Code Description Service Date Service Provider Modifiers Qty    95499600543 HC PT THER PROC EA 15 MIN 3/21/2022 Laney Russell, PT GP 2    [id number] HC PT HOT OR COLD PACK TREAT MCARE 3/21/2022 Laney Russell, PT GP 1                    Laney Russell PT  3/21/2022

## 2022-03-23 ENCOUNTER — HOSPITAL ENCOUNTER (OUTPATIENT)
Dept: PHYSICAL THERAPY | Facility: HOSPITAL | Age: 77
Setting detail: THERAPIES SERIES
Discharge: HOME | End: 2022-03-23

## 2022-03-23 DIAGNOSIS — M25.562 ACUTE PAIN OF LEFT KNEE: Primary | ICD-10-CM

## 2022-03-23 DIAGNOSIS — R26.2 DIFFICULTY WALKING: ICD-10-CM

## 2022-03-23 PROCEDURE — 97110 THERAPEUTIC EXERCISES: CPT

## 2022-03-23 NOTE — THERAPY TREATMENT NOTE
Outpatient Physical Therapy Ortho Treatment Note  [facility]     Patient Name: Chuck Zheng  : 1945  MRN: [id number]  Today's Date: 3/23/2022      Visit Date: 2022    Visit Dx:    ICD-10-CM ICD-9-CM   1. Acute pain of left knee  M25.562 719.46   2. Difficulty walking  R26.2 719.7       Patient Active Problem List   Diagnosis   • Atopic rhinitis   • Benign essential hypertension   • Atherosclerosis of coronary artery   • Impaired fasting blood sugar   • Pure hypercholesterolemia   • Cobalamin deficiency   • Primary insomnia   • Health care maintenance   • Elevated PSA, less than 10 ng/ml   • PLACIDO on CPAP Check Pressure +11.5   • History of myocardial infarction   • Benign prostatic hyperplasia with incomplete bladder emptying   • Fatty liver   • Left anterior shoulder pain   • Primary osteoarthritis of left knee   • S/P knee surgery        Past Medical History:   Diagnosis Date   • Adhesive capsulitis of shoulder    • Arthritis    • Benign non-nodular prostatic hyperplasia 2016   • Benign prostatic hypertrophy    • Coronary artery disease    • Functional diarrhea 2017    Resolved with gluten free diet   • H/O cardiovascular stress test      normal   • Hemorrhoid    • History of COVID-19 2020   • Hyperlipidemia    • Hypertension    • Myocardial infarction (HCC)      2007, distal RCA 80% stenosis, s/p PTCA   • Nephrolithiasis     left kidney   • Obstructive sleep apnea     BIPAP   • Primary insomnia 2016   • Rupture of flexor tendon of hand    • Syncope     2004 neg w/u        Past Surgical History:   Procedure Laterality Date   • CHOLECYSTECTOMY     • COLONOSCOPY      2003, nl, , 2014 , nl, needs C-scope in    • CORONARY STENT PLACEMENT      2007 distal RCA   • EYE SURGERY      JOEY CATARACTS W IOL   • HAND SURGERY Right     THUMB SX   • KNEE ARTHROSCOPY Right        • KNEE ARTHROSCOPY Left 1/15/2019    Procedure: LEFT KNEE  ARTHROSCOPY,  PARTIAL MEDIAL MENISECTOMY;  Surgeon: Mason Ochoa MD;  Location: [facility];  Service: Orthopedics   • TONSILLECTOMY     • TOTAL KNEE ARTHROPLASTY Left 12/17/2021    Procedure: TOTAL KNEE ARTHROPLASTY;  Surgeon: Neal Goodwin MD;  Location: [facility] OR;  Service: Orthopedics;  Laterality: Left;                        PT Assessment/Plan     Row Name 03/23/22 1600          PT Assessment    Assessment Comments Mr. Zheng continues with reports of minimal pain though ROM remains limited with impaired gait mechanics. Continued with passive stretch with good tolerance and ability to compelte full revolutions on recumbent bike following. Pt. will continue to benefit from skilled PT to normalize gait mechanics, including increased knee flexion through swing phase and aadequate weight shift.  -            PT Plan    PT Plan Comments increase time on RCB at end? work on gait  -           User Key  (r) = Recorded By, (t) = Taken By, (c) = Cosigned By    Initials Name Provider Type     Ligia Leonard, PT Physical Therapist                 Modalities     Row Name 03/23/22 1500             Moist Heat     Applied Yes  -      Location L quad with passive flex stretch (towel roll under distal femur, gait belt providing stretch around table leg)  -      PT Moist Heat Minutes 10  -            User Key  (r) = Recorded By, (t) = Taken By, (c) = Cosigned By    Initials Name Provider Type     Ligia Leonard, PT Physical Therapist               OP Exercises     Row Name 03/23/22 1500             Subjective Comments    Subjective Comments Yesterday was the first day I didn't do my PT exercises because I ran out of time and it felt good yesterday and throughout the night  -              Subjective Pain    Able to rate subjective pain? yes  -      Pre-Treatment Pain Level 0  -              Total Minutes    25106 - PT Therapeutic Exercise Minutes 33  -              Exercise 1     Exercise Name 1 RCB  -MH      Cueing 1 Verbal  -MH      Time 1 3 min  -MH      Additional Comments at end, seat 9 full rev  -MH              Exercise 3    Exercise Name 3 Stair knee flex stretch  -MH      Cueing 3 Verbal  -MH      Reps 3 5  -MH      Time 3 20sec  -MH              Exercise 4    Exercise Name 4 Nustep, L5  -MH      Cueing 4 Verbal  -MH      Time 4 5 min  -MH      Additional Comments L5  -MH              Exercise 5    Exercise Name 5 Stair HS stretch  -MH      Cueing 5 Verbal  -MH      Reps 5 3  -MH      Time 5 20sec  -MH              Exercise 7    Exercise Name 7 leg press  -MH      Cueing 7 Verbal;Demo  -MH      Sets 7 2  -MH      Reps 7 10  -MH      Additional Comments B 140#  -MH              Exercise 9    Exercise Name 9 wall slide  -MH      Cueing 9 Verbal  -MH      Reps 9 15  -MH      Time 9 10sec  -MH            User Key  (r) = Recorded By, (t) = Taken By, (c) = Cosigned By    Initials Name Provider Type    Ligia Perez, PT Physical Therapist                              PT OP Goals     Row Name 03/23/22 1500          PT Short Term Goals    STG Date to Achieve 02/06/22  -     STG 1 Pt will report pain rated 2/10 at worst in order to demonstrate ability to return to normalized ADLs and functional activities.  -     STG 1 Progress Met  -     STG 2 Pt will be independent with initial HEP for symptom management.  -     STG 2 Progress Met  -     STG 3 Pt will demonstrate knee AROM flex to 100 deg in order to improve ability to perform ADLs.  -     STG 3 Progress Progressing  -            Long Term Goals    LTG Date to Achieve 03/06/22  -     LTG 1 Pt will be independent and compliant with advanced HEP for long term management of symptoms and prevention of future occurrence.  -     LTG 1 Progress Progressing  -     LTG 2 Pt will reduce level of perceived disability as measured by the KOS-ADL  to 90% in order to improve QOL.  -     LTG 2 Progress Progressing  -     LTG 3  Pt will demonstrate knee AROM 0-115 in order to improve ability to ascend/descend stairs and perform gait tasks.  -     LTG 3 Progress Ongoing  -     LTG 4 Pt will demonstrate L knee flex/ext strength to 4+/5 in order to improve endurance with recreational activities.  -     LTG 4 Progress Progressing  -     LTG 5 Pt will demonstrate proper gait pattern without AD in order to reduce stress on affected tissues.  -     LTG 5 Progress Ongoing  -           User Key  (r) = Recorded By, (t) = Taken By, (c) = Cosigned By    Initials Name Provider Type     Ligia Leonard, PT Physical Therapist                Therapy Education  Given: Symptoms/condition management, Posture/body mechanics  Program: Reinforced  How Provided: Verbal, Demonstration  Provided to: Patient  Level of Understanding: Verbalized, Demonstrated              Time Calculation:   Start Time: 1532  Stop Time: 1615  Time Calculation (min): 43 min  Total Timed Code Minutes- PT: 33 minute(s)  Timed Charges  82798 - PT Therapeutic Exercise Minutes: 33  Untimed Charges  PT Moist Heat Minutes: 10  Total Minutes  Timed Charges Total Minutes: 33  Untimed Charges Total Minutes: 10   Total Minutes: 43  Therapy Charges for Today     Code Description Service Date Service Provider Modifiers Qty    84070967327 HC PT THER PROC EA 15 MIN 3/23/2022 Ligia Leonard, PT GP 2    43264865906 HC PT HOT OR COLD PACK TREAT MCARE 3/23/2022 Ligia Leonard, PT GP 1                    Ligia Leonard PT  3/23/2022

## 2022-03-28 ENCOUNTER — APPOINTMENT (OUTPATIENT)
Dept: PHYSICAL THERAPY | Facility: HOSPITAL | Age: 77
End: 2022-03-28

## 2022-03-30 ENCOUNTER — HOSPITAL ENCOUNTER (OUTPATIENT)
Dept: PHYSICAL THERAPY | Facility: HOSPITAL | Age: 77
Setting detail: THERAPIES SERIES
Discharge: HOME | End: 2022-03-30

## 2022-03-30 DIAGNOSIS — R26.2 DIFFICULTY WALKING: ICD-10-CM

## 2022-03-30 DIAGNOSIS — M25.562 ACUTE PAIN OF LEFT KNEE: Primary | ICD-10-CM

## 2022-03-30 PROCEDURE — 97110 THERAPEUTIC EXERCISES: CPT

## 2022-03-30 NOTE — THERAPY TREATMENT NOTE
Outpatient Physical Therapy Ortho Treatment Note  [facility]     Patient Name: Chuck Zheng  : 1945  MRN: [id number]  Today's Date: 3/30/2022      Visit Date: 2022    Visit Dx:    ICD-10-CM ICD-9-CM   1. Acute pain of left knee  M25.562 719.46   2. Difficulty walking  R26.2 719.7       Patient Active Problem List   Diagnosis   • Atopic rhinitis   • Benign essential hypertension   • Atherosclerosis of coronary artery   • Impaired fasting blood sugar   • Pure hypercholesterolemia   • Cobalamin deficiency   • Primary insomnia   • Health care maintenance   • Elevated PSA, less than 10 ng/ml   • PLACIDO on CPAP Check Pressure +11.5   • History of myocardial infarction   • Benign prostatic hyperplasia with incomplete bladder emptying   • Fatty liver   • Left anterior shoulder pain   • Primary osteoarthritis of left knee   • S/P knee surgery        Past Medical History:   Diagnosis Date   • Adhesive capsulitis of shoulder    • Arthritis    • Benign non-nodular prostatic hyperplasia 2016   • Benign prostatic hypertrophy    • Coronary artery disease    • Functional diarrhea 2017    Resolved with gluten free diet   • H/O cardiovascular stress test      normal   • Hemorrhoid    • History of COVID-19 2020   • Hyperlipidemia    • Hypertension    • Myocardial infarction (HCC)      2007, distal RCA 80% stenosis, s/p PTCA   • Nephrolithiasis     left kidney   • Obstructive sleep apnea     BIPAP   • Primary insomnia 2016   • Rupture of flexor tendon of hand    • Syncope     2004 neg w/u        Past Surgical History:   Procedure Laterality Date   • CHOLECYSTECTOMY     • COLONOSCOPY      2003, nl, , 2014 , nl, needs C-scope in    • CORONARY STENT PLACEMENT      2007 distal RCA   • EYE SURGERY      JOEY CATARACTS W IOL   • HAND SURGERY Right     THUMB SX   • KNEE ARTHROSCOPY Right        • KNEE ARTHROSCOPY Left 1/15/2019    Procedure: LEFT KNEE  ARTHROSCOPY,  PARTIAL MEDIAL MENISECTOMY;  Surgeon: Mason Ochoa MD;  Location: [facility], [place], operated by [facility];  Service: Orthopedics   • TONSILLECTOMY     • TOTAL KNEE ARTHROPLASTY Left 12/17/2021    Procedure: TOTAL KNEE ARTHROPLASTY;  Surgeon: Neal Goodwin MD;  Location: [facility] OR;  Service: Orthopedics;  Laterality: Left;                        PT Assessment/Plan     Row Name 03/30/22 1600          PT Assessment    Assessment Comments Continued with sustained passive stretch though reduced time to allow for increased attention on gait mechanics. Added step over/back leonides to emphasize knee flexion during swing phase with cueing for proper heel strike with ambulation over level ground. Pt. continues with antalgic gait pattern particularly with initial  steps though improves following exercises. Pt. tolerated increased resistance on leg press and increased time on recumbent bike without complaint.  -            PT Plan    PT Plan Comments continue gait training, consider step ups?  -           User Key  (r) = Recorded By, (t) = Taken By, (c) = Cosigned By    Initials Name Provider Type     Ligia Leonard, PT Physical Therapist                 Modalities     Row Name 03/30/22 1500             Moist Heat     Applied Yes  -      Location L quad with passive flex stretch (towel roll under distal femur, gait belt providing stretch around table leg)  -      PT Moist Heat Minutes 5  -            User Key  (r) = Recorded By, (t) = Taken By, (c) = Cosigned By    Initials Name Provider Type     Ligia Leonard, PT Physical Therapist               OP Exercises     Row Name 03/30/22 1500             Subjective Comments    Subjective Comments I have good and bad days  -              Total Minutes    01866 - PT Therapeutic Exercise Minutes 38  -              Exercise 1    Exercise Name 1 RCB  -      Cueing 1 Verbal  -      Time 1 5 min  -      Additional Comments at end, seat 9  -               Exercise 2    Exercise Name 2 step over/back leonides then gait training  -      Cueing 2 Verbal;Demo  -      Reps 2 15x  -      Time 2 cueing with gait foor proper heel strike and knee flexion during swing phase  -              Exercise 3    Exercise Name 3 Stair knee flex stretch  -      Cueing 3 Verbal  -      Reps 3 3  -      Time 3 20sec  -              Exercise 4    Exercise Name 4 Nustep, L5  -      Cueing 4 Verbal  -      Time 4 5 min  -      Additional Comments L5  -              Exercise 7    Exercise Name 7 leg press  -      Cueing 7 Verbal;Demo  -      Sets 7 2  -MH      Reps 7 10  -      Additional Comments B 160#  -              Exercise 9    Exercise Name 9 wall slide  -      Cueing 9 Verbal  -      Reps 9 10  -      Time 9 10sec  -            User Key  (r) = Recorded By, (t) = Taken By, (c) = Cosigned By    Initials Name Provider Type    Ligia Perez, PT Physical Therapist                              PT OP Goals     Row Name 03/30/22 1600          PT Short Term Goals    STG Date to Achieve 02/06/22  -     STG 1 Pt will report pain rated 2/10 at worst in order to demonstrate ability to return to normalized ADLs and functional activities.  -     STG 1 Progress Met  -     STG 2 Pt will be independent with initial HEP for symptom management.  -     STG 2 Progress Met  -     STG 3 Pt will demonstrate knee AROM flex to 100 deg in order to improve ability to perform ADLs.  -     STG 3 Progress Progressing  -            Long Term Goals    LTG Date to Achieve 03/06/22  -     LTG 1 Pt will be independent and compliant with advanced HEP for long term management of symptoms and prevention of future occurrence.  -     LTG 1 Progress Progressing  -     LTG 2 Pt will reduce level of perceived disability as measured by the KOS-ADL  to 90% in order to improve QOL.  -     LTG 2 Progress Progressing  -     LTG 3 Pt will demonstrate knee AROM 0-115 in  order to improve ability to ascend/descend stairs and perform gait tasks.  -     LTG 3 Progress Ongoing  -     LTG 4 Pt will demonstrate L knee flex/ext strength to 4+/5 in order to improve endurance with recreational activities.  -     LTG 4 Progress Progressing  -     LTG 5 Pt will demonstrate proper gait pattern without AD in order to reduce stress on affected tissues.  -     LTG 5 Progress Ongoing  -           User Key  (r) = Recorded By, (t) = Taken By, (c) = Cosigned By    Initials Name Provider Type     Ligia Leonard PT Physical Therapist                Therapy Education  Education Details: discussed POC, frequency of PT  Given: Symptoms/condition management, Posture/body mechanics, Mobility training  Program: Reinforced  How Provided: Verbal, Demonstration  Provided to: Patient  Level of Understanding: Verbalized, Demonstrated              Time Calculation:   Start Time: 1530  Stop Time: 1613  Time Calculation (min): 43 min  Total Timed Code Minutes- PT: 38 minute(s)  Timed Charges  14527 - PT Therapeutic Exercise Minutes: 38  Untimed Charges  PT Moist Heat Minutes: 5  Total Minutes  Timed Charges Total Minutes: 38  Untimed Charges Total Minutes: 5   Total Minutes: 43  Therapy Charges for Today     Code Description Service Date Service Provider Modifiers Qty    61912977424 HC PT THER PROC EA 15 MIN 3/30/2022 Ligia Leonard, PT GP 3    [id number] HC PT HOT OR COLD PACK TREAT MCARE 3/30/2022 Ligia Leonard, PT GP 1                    Ligia Leonard PT  3/30/2022

## 2022-04-06 ENCOUNTER — HOSPITAL ENCOUNTER (OUTPATIENT)
Dept: PHYSICAL THERAPY | Facility: HOSPITAL | Age: 77
Setting detail: THERAPIES SERIES
Discharge: HOME | End: 2022-04-06

## 2022-04-06 DIAGNOSIS — R26.2 DIFFICULTY WALKING: ICD-10-CM

## 2022-04-06 DIAGNOSIS — M25.562 ACUTE PAIN OF LEFT KNEE: Primary | ICD-10-CM

## 2022-04-06 PROCEDURE — 97110 THERAPEUTIC EXERCISES: CPT

## 2022-04-06 PROCEDURE — G0283 ELEC STIM OTHER THAN WOUND: HCPCS

## 2022-04-11 ENCOUNTER — HOSPITAL ENCOUNTER (OUTPATIENT)
Dept: PHYSICAL THERAPY | Facility: HOSPITAL | Age: 77
Setting detail: THERAPIES SERIES
Discharge: HOME | End: 2022-04-11

## 2022-04-11 DIAGNOSIS — R26.2 DIFFICULTY WALKING: ICD-10-CM

## 2022-04-11 DIAGNOSIS — M25.562 ACUTE PAIN OF LEFT KNEE: Primary | ICD-10-CM

## 2022-04-11 PROCEDURE — 97110 THERAPEUTIC EXERCISES: CPT

## 2022-04-11 PROCEDURE — G0283 ELEC STIM OTHER THAN WOUND: HCPCS

## 2022-04-11 NOTE — THERAPY RE-EVALUATION
Outpatient Physical Therapy Ortho Progress Note  [facility]     Patient Name: Chuck Zheng  : 1945  MRN: [id number]  Today's Date: 2022      Visit Date: 2022    Visit Dx:    ICD-10-CM ICD-9-CM   1. Acute pain of left knee  M25.562 719.46   2. Difficulty walking  R26.2 719.7       Patient Active Problem List   Diagnosis   • Atopic rhinitis   • Benign essential hypertension   • Atherosclerosis of coronary artery   • Impaired fasting blood sugar   • Pure hypercholesterolemia   • Cobalamin deficiency   • Primary insomnia   • Health care maintenance   • Elevated PSA, less than 10 ng/ml   • PLACIDO on CPAP Check Pressure +11.5   • History of myocardial infarction   • Benign prostatic hyperplasia with incomplete bladder emptying   • Fatty liver   • Left anterior shoulder pain   • Primary osteoarthritis of left knee   • S/P knee surgery        Past Medical History:   Diagnosis Date   • Adhesive capsulitis of shoulder    • Arthritis    • Benign non-nodular prostatic hyperplasia 2016   • Benign prostatic hypertrophy    • Coronary artery disease    • Functional diarrhea 2017    Resolved with gluten free diet   • H/O cardiovascular stress test      normal   • Hemorrhoid    • History of COVID-19 2020   • Hyperlipidemia    • Hypertension    • Myocardial infarction (HCC)      2007, distal RCA 80% stenosis, s/p PTCA   • Nephrolithiasis     left kidney   • Obstructive sleep apnea     BIPAP   • Primary insomnia 2016   • Rupture of flexor tendon of hand    • Syncope     2004 neg w/u        Past Surgical History:   Procedure Laterality Date   • CHOLECYSTECTOMY     • COLONOSCOPY      2003, nl, , 2014 , nl, needs C-scope in    • CORONARY STENT PLACEMENT      2007 distal RCA   • EYE SURGERY      JOEY CATARACTS W IOL   • HAND SURGERY Right     THUMB SX   • KNEE ARTHROSCOPY Right        • KNEE ARTHROSCOPY Left 1/15/2019    Procedure: LEFT KNEE  ARTHROSCOPY,  PARTIAL MEDIAL MENISECTOMY;  Surgeon: Mason Ochoa MD;  Location: [facility] OR [facility];  Service: Orthopedics   • TONSILLECTOMY     • TOTAL KNEE ARTHROPLASTY Left 12/17/2021    Procedure: TOTAL KNEE ARTHROPLASTY;  Surgeon: Neal Goodwin MD;  Location: [facility] MAIN OR;  Service: Orthopedics;  Laterality: Left;        PT Ortho     Row Name 04/11/22 1600       Left Lower Ext    LT Lower Extremity Comments L nee to 102 this date with passive stretch in sitting  -          User Key  (r) = Recorded By, (t) = Taken By, (c) = Cosigned By    Initials Name Provider Type     Ligia Leonard, PT Physical Therapist                             PT Assessment/Plan     Row Name 04/11/22 1517          PT Assessment    Functional Limitations Decreased safety during functional activities;Impaired gait;Impaired locomotion;Limitation in home management;Limitations in community activities;Limitations in functional capacity and performance;Performance in leisure activities;Performance in self-care ADL;Performance in work activities  -     Impairments Balance;Edema;Gait;Endurance;Impaired flexibility;Muscle strength;Pain;Posture;Range of motion  -     Assessment Comments Chuck Zheng has been seen for 24 physical therapy sessions s/p L TKA 12/17/2021. Treatment has included therapeutic exercise, manual therapy, therapeutic activity, electrical stimulation  and patient education with home exercise program . Progress to physical therapy goals is slow as pt. Has met 2/3 STGs, and 1/5 LTGs. Pt. Continues to demonstrate significant deficits in L knee AROM particularly into flexion. Pt. Gait remains antalgic though improves with increased ambulation and has returned to low-level walking regimen. Pt. Demonstrates limited knee flexion during swing phase and full knee extension at terminal stance with cueing for heel strike. Overall, pt. Pain levels have remained quite low aside from recent flare up last week, states he  is back to previous baseline and was able to play bridge this weekend requiring extended time in sitting which is typically problematic but tolerated reasonably well. Pt. Has verbalized compliance with HEP despite continued stiffness and limited P/AROM. In recent weeks of therapy we have implemented use of modalities including moist heat and electrical stimulation with sustained knee flexion stretch with minimal improvement noted. Pt. With poor tolerance to manual therapy throughout previous treatments and no significant change in ROM in recent weeks despite efforts in therapeutic exercise/stretching, modalities and manual interventions. Discussed with pt. Plan to hold on formal PT at this time due to plateau of progress until follow up with MD to discuss next appropriate steps regarding continued deficits. He will benefit from continued skilled physical therapy to address remaining impairments and functional limitations.  -     Please refer to paper survey for additional self-reported information Yes  -MH     Rehab Potential Good  -     Patient/caregiver participated in establishment of treatment plan and goals Yes  -MH     Patient would benefit from skilled therapy intervention Yes  -MH            PT Plan    PT Frequency 1x/week  -     Predicted Duration of Therapy Intervention (PT) 6 visits  -     Planned CPT's? PT RE-EVAL: 99863;PT THER PROC EA 15 MIN: 25202;PT THER ACT EA 15 MIN: 53074;PT MANUAL THERAPY EA 15 MIN: 29560;PT NEUROMUSC RE-EDUCATION EA 15 MIN: 99796;PT GAIT TRAINING EA 15 MIN: 44716;PT SELF CARE/HOME MGMT/TRAIN EA 15: 17346;PT HOT OR COLD PACK TREAT MCARE;PT ELECTRICAL STIM UNATTEND: ;PT ULTRASOUND EA 15 MIN: 30851;PT ELECTRICAL STIM ATTD EA 15 MIN: 28634  -     PT Plan Comments plan to hold on PT pending follow up with MD, call to schedule additional appointments if needed  -           User Key  (r) = Recorded By, (t) = Taken By, (c) = Cosigned By    Initials Name Provider Type     " Ligia Leonard, PT Physical Therapist                 Modalities     Row Name 04/11/22 1500             Moist Heat    MH Applied Yes  -      Location L quad with passive flex stretch (towel roll under distal femur, gait belt providing stretch around table leg)  -      PT Moist Heat Minutes 10  -MH              ELECTRICAL STIMULATION    Attended/Unattended Unattended  with MH/passive stretch; skin inspected before/after with no adverse response noted. MIld irritation prior to e-stim ntoed distalmedial knee with pt. verbalized it has been present since bandage was removed  -      Stimulation Type IFC  -      Location/Electrode Placement/Other L knee  -            User Key  (r) = Recorded By, (t) = Taken By, (c) = Cosigned By    Initials Name Provider Type     Ligia Leonard, PT Physical Therapist               OP Exercises     Row Name 04/11/22 1500             Subjective Comments    Subjective Comments Its donig better today  -              Total Minutes    73570 - PT Therapeutic Exercise Minutes 31  -MH              Exercise 2    Exercise Name 2 high knee walking march - simulate stepping over hudle  -      Cueing 2 Verbal;Demo  -      Reps 2 3 laps // bars  -              Exercise 3    Exercise Name 3 Stair knee flex stretch  -      Cueing 3 Verbal  -      Reps 3 5  -MH      Time 3 20sec  -              Exercise 4    Exercise Name 4 Nustep, L5  -      Cueing 4 Verbal  -      Time 4 5 min  -MH      Additional Comments L5  -MH              Exercise 5    Exercise Name 5 standing HS curls  -      Cueing 5 Verbal;Demo  -MH      Sets 5 2  -MH      Reps 5 10  -MH      Time 5 cueing to avoid forward trunk flex  -              Exercise 6    Exercise Name 6 8\" step up forward/lat  -      Cueing 6 Verbal;Demo  -MH      Reps 6 10ea  -      Additional Comments opp knee   -              Exercise 7    Exercise Name 7 cueing for knee/hip flex and avoid circumduction  -         "    User Key  (r) = Recorded By, (t) = Taken By, (c) = Cosigned By    Initials Name Provider Type     Ligia Leonard, PT Physical Therapist                              PT OP Goals     Row Name 04/11/22 1600          PT Short Term Goals    STG Date to Achieve 02/06/22  -     STG 1 Pt will report pain rated 2/10 at worst in order to demonstrate ability to return to normalized ADLs and functional activities.  -     STG 1 Progress Met  -     STG 2 Pt will be independent with initial HEP for symptom management.  -     STG 2 Progress Met  -     STG 3 Pt will demonstrate knee AROM flex to 100 deg in order to improve ability to perform ADLs.  -     STG 3 Progress Progressing  -     STG 3 Progress Comments see ortho  -            Long Term Goals    LTG Date to Achieve 03/06/22  -     LTG 1 Pt will be independent and compliant with advanced HEP for long term management of symptoms and prevention of future occurrence.  -     LTG 1 Progress Met  -     LTG 1 Progress Comments updated HEP thsi date though verbalizes compliance with performance of HEP  -     LTG 2 Pt will reduce level of perceived disability as measured by the KOS-ADL  to 90% in order to improve QOL.  -     LTG 2 Progress Progressing  -     LTG 2 Progress Comments 81%  -     LTG 3 Pt will demonstrate knee AROM 0-115 in order to improve ability to ascend/descend stairs and perform gait tasks.  -     LTG 3 Progress Ongoing  -     LTG 3 Progress Comments see ortho  -     LTG 4 Pt will demonstrate L knee flex/ext strength to 4+/5 in order to improve endurance with recreational activities.  -     LTG 4 Progress Progressing  -     LTG 4 Progress Comments continues to lack full knee ext/flex limiting adequate strength through full range  -     LTG 5 Pt will demonstrate proper gait pattern without AD in order to reduce stress on affected tissues.  -     LTG 5 Progress Ongoing;[facility]ing  [facility] Comments no  longer ambulating with AD but remains antalgic with decreased knee flexion during swing phase  -           User Key  (r) = Recorded By, (t) = Taken By, (c) = Cosigned By    Initials Name Provider Type     Ligia Leonard PT Physical Therapist                Therapy Education  Education Details: Encouraged pt. to discuss progress/continued difficulties with range at MD appointment, pt. also verbalized need for possible refill on medication and advised pt. to continue that discussion with MD as well. Updated and reviewed HEP Access Code: [email or web address]  Given: Symptoms/condition management, Pain management  Program: Reinforced, Progressed  How Provided: Verbal, Demonstration, Written  Provided to: Patient  Level of Understanding: Teach back education performed, Verbalized, Demonstrated    Outcome Measure Options: Knee Outcome Score- ADL  Knee Outcome Score  Knee Outcome Score Comments: 65/80 81%      Time Calculation:   Start Time: 1530  Stop Time: 1615  Time Calculation (min): 45 min  Total Timed Code Minutes- PT: 31 minute(s)  Timed Charges  74205 - PT Therapeutic Exercise Minutes: 31  Untimed Charges  PT E-Stim Unattended Minutes:  (10 during moist heat)  PT Moist Heat Minutes: 10  Total Minutes  Timed Charges Total Minutes: 31  Untimed Charges Total Minutes: 10   Total Minutes: 41  Therapy Charges for Today     Code Description Service Date Service Provider Modifiers Qty    85032305618 HC PT THER PROC EA 15 MIN 4/11/2022 Ligia Leoanrd, PT GP 2    85728827684 HC PT HOT OR COLD PACK TREAT MCARE 4/11/2022 Ligia Leonard, PT GP 1    62080344857 HC PT ELECTRICAL STIM UNATTENDED 4/11/2022 Ligia Leonard, PT  1          PT G-Codes  Outcome Measure Options: Knee Outcome Score- ADL         Ligia Leonard PT  4/11/2022

## 2022-04-12 ENCOUNTER — OFFICE VISIT (OUTPATIENT)
Dept: ORTHOPEDIC SURGERY | Facility: CLINIC | Age: 77
End: 2022-04-12

## 2022-04-12 VITALS — TEMPERATURE: 97.3 F | BODY MASS INDEX: 25.46 KG/M2 | HEIGHT: 68 IN | WEIGHT: 168 LBS

## 2022-04-12 DIAGNOSIS — Z96.652 S/P TKR (TOTAL KNEE REPLACEMENT), LEFT: ICD-10-CM

## 2022-04-12 DIAGNOSIS — R52 PAIN: Primary | ICD-10-CM

## 2022-04-12 PROCEDURE — 73562 X-RAY EXAM OF KNEE 3: CPT | Performed by: NURSE PRACTITIONER

## 2022-04-12 PROCEDURE — 99212 OFFICE O/P EST SF 10 MIN: CPT | Performed by: NURSE PRACTITIONER

## 2022-04-12 RX ORDER — MELOXICAM 15 MG/1
15 TABLET ORAL DAILY PRN
Qty: 30 TABLET | Refills: 0 | Status: SHIPPED | OUTPATIENT
Start: 2022-04-12 | End: 2022-10-06 | Stop reason: SDUPTHER

## 2022-04-12 NOTE — PROGRESS NOTES
"Chuck Zheng : 1945 MRN: [id number] DATE: 2022    Chief Complaint:  Follow up left total knee      SUBJECTIVE:Patient returns today for a general follow up of left total knee replacement. Patient reports he had surgery in 2021. He has had issues with postop pain and swelling. Reports that his pain has improved, but still having issues with swelling and ROM. Reports that PT has finally released him and instructed him to continue ROM exercises at home.     OBJECTIVE:    Temp 97.3 °F (36.3 °C) (Temporal)   Ht 172 cm (67.72\")   Wt 76.2 kg (168 lb)   BMI 25.76 kg/m²   Family History   Problem Relation Age of Onset   • Dementia Mother    • Glaucoma Mother    • Heart disease Father    • Colon polyps Father    • Malig Hyperthermia Neg Hx      Past Medical History:   Diagnosis Date   • Adhesive capsulitis of shoulder    • Arthritis    • Benign non-nodular prostatic hyperplasia 2016   • Benign prostatic hypertrophy    • Coronary artery disease    • Functional diarrhea 2017    Resolved with gluten free diet   • H/O cardiovascular stress test      normal   • Hemorrhoid    • History of COVID-19 2020   • Hyperlipidemia    • Hypertension    • Myocardial infarction (HCC)      2007, distal RCA 80% stenosis, s/p PTCA   • Nephrolithiasis     left kidney   • Obstructive sleep apnea     BIPAP   • Primary insomnia 2016   • Rupture of flexor tendon of hand    • Syncope     2004 neg w/u     Past Surgical History:   Procedure Laterality Date   • CHOLECYSTECTOMY     • COLONOSCOPY      2003, nl, , 2014 , nl, needs C-scope in    • CORONARY STENT PLACEMENT      2007 distal RCA   • EYE SURGERY      JOEY CATARACTS W IOL   • HAND SURGERY Right     THUMB SX   • KNEE ARTHROSCOPY Right        • KNEE ARTHROSCOPY Left 1/15/2019    Procedure: LEFT KNEE ARTHROSCOPY,  PARTIAL MEDIAL MENISECTOMY;  Surgeon: Mason Ochoa MD;  Location: [facility] OR [facility] [place];  Service: " Orthopedics   • TONSILLECTOMY     • TOTAL KNEE ARTHROPLASTY Left 12/17/2021    Procedure: TOTAL KNEE ARTHROPLASTY;  Surgeon: Neal Goodwin MD;  Location: [facility];  Service: Orthopedics;  Laterality: Left;     Social History     Socioeconomic History   • Marital status:    Tobacco Use   • Smoking status: Never Smoker   • Smokeless tobacco: Never Used   Vaping Use   • Vaping Use: Never used   Substance and Sexual Activity   • Alcohol use: No     Comment: 7 drinks weekly   • Drug use: No   • Sexual activity: Defer       Review of Systems: 14 point review of systems performed pertinent positives and negatives discussed above, all other systems are negative    Exam:. The incision is well healed. Range of motion is measured at  0 to 105. The calf is soft and nontender with a negative Homans sign. Alignment is neutral. Good quad strength. There is no evidence of varus/valgus or flexion instability. No effusion. Intact to light touch with palpable distal pulses.     DIAGNOSTIC STUDIES  Xrays: 3 views(AP bilateral knees, lateral left, and sunrise bilateral knees) were ordered and reviewed for evaluation of left knee replacement. They demonstrate a well positioned, well aligned knee replacement without complicating factors noted. In comparison with previous films there has been no change.    ASSESSMENT:    Follow up left knee replacement       PLAN:    Continue activities as tolerated - instructed to work on Flexion exercises  Follow up in December for annual appointment    MAXINE Mcadams  4/12/2022

## 2022-10-06 RX ORDER — MELOXICAM 15 MG/1
15 TABLET ORAL DAILY PRN
Qty: 30 TABLET | Refills: 0 | Status: SHIPPED | OUTPATIENT
Start: 2022-10-06

## 2022-10-06 NOTE — TELEPHONE ENCOUNTER
Caller: ASHELY Prasad call back number: [phone]      Requested Prescriptions:   Requested Prescriptions     Pending Prescriptions Disp Refills   • meloxicam (Mobic) 15 MG tablet 30 tablet 0     Sig: Take 1 tablet by mouth Daily As Needed for Moderate Pain.        Pharmacy where request should be sent:  Pharmacy:  [id number] [place] [address] AT [facility] - [phone]  - [phone] FX    Additional details provided by patient: PT IS TRAINING FOR A HIKE AND USING THEM TO HELP WITH THE SWELLING    Does the patient have less than a 3 day supply:  [] Yes  [x] No    Jannie Becerra Rep   10/06/22 14:24 EDT

## 2022-10-07 RX ORDER — MELOXICAM 15 MG/1
15 TABLET ORAL DAILY PRN
Qty: 90 TABLET | OUTPATIENT
Start: 2022-10-07

## 2022-11-23 ENCOUNTER — DOCUMENTATION (OUTPATIENT)
Dept: PHYSICAL THERAPY | Facility: HOSPITAL | Age: 77
End: 2022-11-23

## 2022-11-23 DIAGNOSIS — R26.2 DIFFICULTY WALKING: ICD-10-CM

## 2022-11-23 DIAGNOSIS — M25.562 ACUTE PAIN OF LEFT KNEE: Primary | ICD-10-CM

## 2022-11-23 NOTE — THERAPY DISCHARGE NOTE
Outpatient Physical Therapy Discharge Summary         Patient Name: Chuck Zheng  : 1945  MRN: [id number]    Today's Date: 2022    Visit Dx:    ICD-10-CM ICD-9-CM   1. Acute pain of left knee  M25.562 719.46   2. Difficulty walking  R26.2 719.7        PT OP Goals     Row Name 22 1100          PT Short Term Goals    STG Date to Achieve 22  -     STG 1 Pt will report pain rated 2/10 at worst in order to demonstrate ability to return to normalized ADLs and functional activities.  -     STG 1 Progress Met  -     STG 2 Pt will be independent with initial HEP for symptom management.  -     STG 2 Progress Met  -     STG 3 Pt will demonstrate knee AROM flex to 100 deg in order to improve ability to perform ADLs.  -     STG 3 Progress Not Met  -        Long Term Goals    LTG Date to Achieve 22  -     LTG 1 Pt will be independent and compliant with advanced HEP for long term management of symptoms and prevention of future occurrence.  -     LTG 1 Progress Met  -     LTG 2 Pt will reduce level of perceived disability as measured by the KOS-ADL  to 90% in order to improve QOL.  -     LTG 2 Progress Not Met  -     LTG 3 Pt will demonstrate knee AROM 0-115 in order to improve ability to ascend/descend stairs and perform gait tasks.  -     LTG 3 Progress Not Met  [facility]     LTG 4 Pt will demonstrate L knee flex/ext strength to 4+/5 in order to improve endurance with recreational activities.  -     LTG 4 Progress Not Met  -     LTG 5 Pt will demonstrate proper gait pattern without AD in order to reduce stress on affected tissues.  -     LTG 5 Progress Not Met  -           User Key  (r) = Recorded By, (t) = Taken By, (c) = Cosigned By    Initials Name Provider Type    Ligia Perez, PT Physical Therapist                OP PT Discharge Summary  Date of Discharge: 22  Reason for Discharge: Lack of progress  Outcomes Achieved: Refer to plan of care  for updates on goals achieved  Discharge Destination: Home with home program  Discharge Instructions/Additional Comments: Pt. was seen for 24 physical therapy sessions s/p L TKA 12/17/2021. Treatment included therapeutic exercise, manual therapy, therapeutic activity, electrical stimulation  and patient education with home exercise program . Progress to physical therapy goals was slow as pt. met 2/3 STGs, and 1/5 LTGs. Pt. Continued to demonstrate significant deficits in L knee AROM particularly into flexion. Pt. Gait remained antalgic though did improve with increased ambulation and he returned to low-level walking regimen. Pt. Demonstrated limited knee flexion during swing phase and full knee extension at terminal stance. Pt. verbalized compliance with HEP despite continued stiffness and limited P/AROM. Throughout course of therapy implemented use of modalities including moist heat and electrical stimulation with sustained knee flexion stretch with minimal improvement noted. Pt. With poor tolerance to manual therapy throughout previous treatments and no significant change in ROM over later weeks of therapy despite efforts in therapeutic exercise/stretching, modalities and manual interventions. At last appointment discussed with pt. importance of compliance with HEP, plan to hold on PT and f/u with MD prior to scheduling further visits. Pt. did not return for further follow up.      Time Calculation:                    Ligia Leonard, PT  11/23/2022

## 2022-12-16 ENCOUNTER — OFFICE VISIT (OUTPATIENT)
Dept: ORTHOPEDIC SURGERY | Facility: CLINIC | Age: 77
End: 2022-12-16

## 2022-12-16 VITALS — BODY MASS INDEX: 27 KG/M2 | TEMPERATURE: 97.2 F | WEIGHT: 172 LBS | HEIGHT: 67 IN

## 2022-12-16 DIAGNOSIS — R52 PAIN: Primary | ICD-10-CM

## 2022-12-16 DIAGNOSIS — Z96.652 S/P TKR (TOTAL KNEE REPLACEMENT), LEFT: ICD-10-CM

## 2022-12-16 PROCEDURE — 99212 OFFICE O/P EST SF 10 MIN: CPT | Performed by: NURSE PRACTITIONER

## 2022-12-16 PROCEDURE — 73562 X-RAY EXAM OF KNEE 3: CPT | Performed by: NURSE PRACTITIONER

## 2022-12-16 RX ORDER — CLINDAMYCIN HYDROCHLORIDE 300 MG/1
CAPSULE ORAL
Qty: 2 CAPSULE | Refills: 5 | Status: SHIPPED | OUTPATIENT
Start: 2022-12-16

## 2022-12-16 NOTE — PROGRESS NOTES
"Chuck Zheng : 1945 MRN: [id number] DATE: 2022    DIAGNOSIS: Annual follow up left total knee      SUBJECTIVE:Patient returns today for a one year follow up of left total knee replacement. Patient reports that he is doing better than his last visit.  He states he went back to physical therapy to results physiotherapy and they greatly benefited him in comparison to going to [facility] after surgery.  Patient states he did not feel like he was properly taking care of with our physical therapy as he saw a different person every time in comparison to where he saw the same person that results.  Overall patient states he is very happy with his results now he is walking 5 to 6 miles a day.  He denies any limitation from his knee.  Denies any signs or symptoms of infection, and he is without any other significant complaints today.    OBJECTIVE:    Temp 97.2 °F (36.2 °C) (Temporal)   Ht 170.2 cm (67\")   Wt 78 kg (172 lb)   BMI 26.94 kg/m²   Family History   Problem Relation Age of Onset   • Dementia Mother    • Glaucoma Mother    • Heart disease Father    • Colon polyps Father    • Malig Hyperthermia Neg Hx      Past Medical History:   Diagnosis Date   • Adhesive capsulitis of shoulder    • Arthritis    • Benign non-nodular prostatic hyperplasia 2016   • Benign prostatic hypertrophy    • Coronary artery disease    • Functional diarrhea 2017    Resolved with gluten free diet   • H/O cardiovascular stress test      normal   • Hemorrhoid    • History of COVID-19 2020   • Hyperlipidemia    • Hypertension    • Myocardial infarction (HCC)      2007, distal RCA 80% stenosis, s/p PTCA   • Nephrolithiasis     left kidney   • Obstructive sleep apnea     BIPAP   • Primary insomnia 2016   • Rupture of flexor tendon of hand    • Syncope     2004 neg w/u   • Tear of meniscus of knee      Past Surgical History:   Procedure Laterality Date   • CHOLECYSTECTOMY     • COLONOSCOPY   "    06/2003, nl, , 02/2014 , nl, needs C-scope in 2024   • CORONARY STENT PLACEMENT      5/2007 distal RCA   • EYE SURGERY      JOEY CATARACTS W IOL   • HAND SURGERY Right     THUMB SX   • KNEE ARTHROSCOPY Right     2015   • KNEE ARTHROSCOPY Left 01/15/2019    Procedure: LEFT KNEE ARTHROSCOPY,  PARTIAL MEDIAL MENISECTOMY;  Surgeon: Mason Ochoa MD;  Location: [facility];  Service: Orthopedics   • TONSILLECTOMY     • TOTAL KNEE ARTHROPLASTY Left 12/17/2021    Procedure: TOTAL KNEE ARTHROPLASTY;  Surgeon: Neal Goodwin MD;  [facility]: [facility] OR;  Service: Orthopedics;  Laterality: Left;     Social History     Socioeconomic History   • Marital status:    Tobacco Use   • Smoking status: Never   • Smokeless tobacco: Never   Vaping Use   • Vaping Use: Never used   Substance and Sexual Activity   • Alcohol use: Yes     Alcohol/week: 6.0 standard drinks     Types: 6 Glasses of wine per week     Comment: 7 drinks weekly   • Drug use: No   • Sexual activity: Not Currently     Partners: Female     Review of Systems - a 14 point review of systems was performed. All systems were negative.    Exam:. The incision is well healed. Range of motion is measured at 0 to 115. The calf is soft and nontender with a negative Homans sign. Alignment is neutral. Good quad strength. There is no evidence of varus/valgus or flexion instability. No effusion. Intact to light touch with palpable distal pulses.     DIAGNOSTIC STUDIES  Xrays: 3 views of the left knee (AP, lateral, and sunrise) were ordered and reviewed for evaluation of recent knee replacement. They demonstrate a well positioned, well aligned knee replacement without complicating factors noted. In comparison with previous films there has been no change.    ASSESSMENT: Annual follow up left knee replacement.    PLAN:  Continue activities as tolerated    Follow up MAXINE Fernandez  12/16/2022

## 2023-04-25 ENCOUNTER — TELEPHONE (OUTPATIENT)
Dept: ORTHOPEDIC SURGERY | Facility: CLINIC | Age: 78
End: 2023-04-25

## 2023-04-25 NOTE — TELEPHONE ENCOUNTER
Caller: Chuck Zheng    Relationship to patient: Self    Best call back number: [phone]    Patient is needing: PT HAD KNEE SURGERY WITH DR. KIM 2021 AND HE SAID HE STARTED DEVELOPING AN ALLERGIC REACTION ALL OVER HIS BODY 3 MONTH AFTER THIS PROCEDURE WAS COMPLETED. PT HAS BEEN TO DERMATOLOGY MULTIPLE TIMES AND NO ANTIBIOTICS OR CREAM SEEMS TO WORK. PT IS WONDERING IF THIS ALLERGIC REACTION MIGHT BE RELATED TO ANY METAL THAT WAS POSSIBLY USED DURING OPERATION. HE IS NEEDING TO CONFIRM THIS INFORMATION ASAP.

## 2023-04-25 NOTE — TELEPHONE ENCOUNTER
I called pt no answer, I left VM for pt to call office back to schedule a follow up with Dr. Goodwin

## 2023-05-16 ENCOUNTER — OFFICE VISIT (OUTPATIENT)
Dept: ORTHOPEDIC SURGERY | Facility: CLINIC | Age: 78
End: 2023-05-16
Payer: COMMERCIAL

## 2023-05-16 VITALS — HEIGHT: 68 IN | TEMPERATURE: 97.3 F | WEIGHT: 172 LBS | BODY MASS INDEX: 26.07 KG/M2

## 2023-05-16 DIAGNOSIS — R52 PAIN: Primary | ICD-10-CM

## 2023-05-16 DIAGNOSIS — Z96.652 STATUS POST LEFT KNEE REPLACEMENT: ICD-10-CM

## 2023-05-16 PROBLEM — D31.30 NEVUS OF CHOROID: Status: ACTIVE | Noted: 2017-08-23

## 2023-05-16 PROBLEM — Z98.61 HISTORY OF PERCUTANEOUS TRANSLUMINAL CORONARY ANGIOPLASTY: Status: ACTIVE | Noted: 2019-02-12

## 2023-05-16 PROBLEM — N20.0 RENAL STONE: Status: ACTIVE | Noted: 2020-11-12

## 2023-05-16 PROBLEM — J30.2 SEASONAL ALLERGIC RHINITIS: Status: ACTIVE | Noted: 2020-11-12

## 2023-05-16 PROBLEM — I21.9 MYOCARDIAL INFARCTION: Status: ACTIVE | Noted: 2021-05-12

## 2023-05-16 PROBLEM — Z98.890 OTHER SPECIFIED POSTPROCEDURAL STATES: Status: ACTIVE | Noted: 2021-12-18

## 2023-05-16 PROBLEM — N40.0 BENIGN LOCALIZED HYPERPLASIA OF PROSTATE: Status: ACTIVE | Noted: 2021-02-23

## 2023-05-16 PROBLEM — M25.519 SHOULDER PAIN: Status: ACTIVE | Noted: 2019-02-07

## 2023-05-16 PROCEDURE — 99213 OFFICE O/P EST LOW 20 MIN: CPT | Performed by: ORTHOPAEDIC SURGERY

## 2023-05-16 RX ORDER — CLOBETASOL PROPIONATE 0.5 MG/G
CREAM TOPICAL
COMMUNITY
Start: 2022-12-28

## 2023-05-16 RX ORDER — BETAMETHASONE DIPROPIONATE 0.5 MG/G
CREAM TOPICAL
COMMUNITY
Start: 2022-12-26

## 2023-05-16 NOTE — PROGRESS NOTES
Patient: Chuck Zheng  YOB: 1945 77 y.o. male  Medical Record Number: [id number]    Chief Complaints:   Chief Complaint   Patient presents with   • Left Knee - Follow-up       History of Present Illness:Chuck Zheng is a 77 y.o. male who presents for follow-up of left total knee about a year and a half out overall he is doing reasonably well he has a little stiffness in the knee but minimal pain.  He did have some skin changes and not only around the knee but also on his arms and chest and head and has seen a dermatologist for this it is unclear exactly what this is related to.  He denies any known history of allergy or reaction to metal or cement    Allergies:   Allergies   Allergen Reactions   • Ace Inhibitors Cough   • Beta Adrenergic Blockers      Can tolerate in low dose   • Sulfa Antibiotics Other (See Comments)     FELT BAD  Category: Allergy;    • Cephalexin Other (See Comments) and Rash     FELT TERRIBLE  FELT TERRIBLE       Medications:   Current Outpatient Medications   Medication Sig Dispense Refill   • amitriptyline (ELAVIL) 25 MG tablet TAKE 1 TABLET BY MOUTH EVERY NIGHT 30 tablet 11   • carvedilol (COREG) 3.125 MG tablet TAKE 1 TABLET BY MOUTH TWICE DAILY WITH MEALS (Patient taking differently: Take 1 tablet by mouth 2 (Two) Times a Day With Meals.) 180 tablet 1   • Cetirizine HCl 10 MG capsule Take 10 mg by mouth Every Morning.     • clindamycin (Cleocin) 300 MG capsule Take 2 capsules one hour prior to dental procedure 2 capsule 5   • clonazePAM (KlonoPIN) 0.5 MG tablet      • finasteride (PROSCAR) 5 MG tablet      • Multiple Vitamin (MULTI VITAMIN DAILY) tablet Take  by mouth. HOLD X 1 WEEK     • Omega-3 Fatty Acids (FISH OIL DOUBLE STRENGTH) 1200 MG capsule Take  by mouth. STOPPED 1/2/19  HOLD X 1 WEEK     • tamsulosin (FLOMAX) 0.4 MG capsule 24 hr capsule      • betamethasone, augmented, (DIPROLENE) 0.05 % cream APPLY TO CHEST AND BACK TWICE DAILY AS DIRECTED (Patient  "not taking: Reported on 5/16/2023)     • clobetasol (TEMOVATE) 0.05 % cream APPLY EVERY DAY TO CHEST AND BACK AS NEEDED FOR RASH (Patient not taking: Reported on 5/16/2023)     • meloxicam (Mobic) 15 MG tablet Take 1 tablet by mouth Daily As Needed for Moderate Pain. (Patient not taking: Reported on 5/16/2023) 30 tablet 0   • mupirocin (BACTROBAN) 2 % ointment APPLY EVERY DAY TO FLANK FOR WOUND CARE (Patient not taking: Reported on 5/16/2023)     • NON FORMULARY Take 1 tablet by mouth Daily. CBD GUMMI 500MG  HOLD X 1 WEEK (Patient not taking: Reported on 5/16/2023)     • rosuvastatin (CRESTOR) 40 MG tablet Take 1 tablet by mouth Every Morning. (Patient not taking: Reported on 5/16/2023) 90 tablet 1     No current facility-administered medications for this visit.         The following portions of the patient's history were reviewed and updated as appropriate: allergies, current medications, past family history, past medical history, past social history, past surgical history and problem list.    Review of Systems:   Pertinent positives/negative listed in HPI above    Physical Exam:   Vitals:    05/16/23 1547   Temp: 97.3 °F (36.3 °C)   Weight: 78 kg (172 lb)   Height: 172.7 cm (68\")   PainSc: 0-No pain   PainLoc: Knee       General: A and O x 3, ASA, NAD      Knee Exam List: Knee:  left    ALIGNMENT:     Neutral  ,   Patella  tracks  Midline without crepitance    GAIT:    Nonantalgic    SKIN:    Well healed midline incision, no erythema or fluctuance    RANGE OF MOTION:   0  -  95   DEG    STRENGTH:   5  / 5    LIGAMENTS:    No varus / valgus instability.   No  Flexion   instability.       DISTAL PULSES:    Paplable    DISTAL SENSATION :   Intact    LYMPHATICS:     No   lymphadenopathy    OTHER:     No   Effusion      Calf soft / nontender ,   Negative Azeem's sign            Radiology:  Xrays 3views left knee (ap,lateral, sunrise) were ordered and reviewed for evaluation of knee pain demonstrating a well-positioned " total knee replacement the most superior margin of the patella on the lateral view shows a small chronic appearing fracture with minimal displacement it is likely functionally insignificant based on location.  In comparison previous films this is new.  It has a chronic appearance.      Assessment/Plan:  Left total knee replacement overall low suspicion of allergy or any mechanical problems I did offer to him that we could send off some blood work to a [facility] in [place] for full allergy testing on metal and cement but I recommended this point that he monitor his response to discontinuation of his medications that he is going to alter.  If he still has any skin reactions or skin lesions he will call back and we will get him set up for this lab testing      Diagnoses and all orders for this visit:    1. Pain (Primary)  -     XR Knee 3 View Left        Neal Goodwin MD  5/16/2023

## 2023-05-16 NOTE — LETTER
May 16, 2023       No Recipients    Patient: Chuck Zheng   YOB: 1945   Date of Visit: 5/16/2023       Dear Dr. Suarez.  I saw Chuck today attached are relevant notes        If you have questions, please do not hesitate to call me. I look forward to following Chuck along with you.         Sincerely,        Neal Goodwin MD        CC:   No Recipients    Neal Goodwin MD  05/16/23 1630  Sign when Signing Visit  Patient: Chuck Zheng  YOB: 1945 77 y.o. male  Medical Record Number: [id number]    Chief Complaints:   Chief Complaint   Patient presents with   • Left Knee - Follow-up       History of Present Illness:Chuck Zheng is a 77 y.o. male who presents for follow-up of left total knee about a year and a half out overall he is doing reasonably well he has a little stiffness in the knee but minimal pain.  He did have some skin changes and not only around the knee but also on his arms and chest and head and has seen a dermatologist for this it is unclear exactly what this is related to.  He denies any known history of allergy or reaction to metal or cement    Allergies:   Allergies   Allergen Reactions   • Ace Inhibitors Cough   • Beta Adrenergic Blockers      Can tolerate in low dose   • Sulfa Antibiotics Other (See Comments)     FELT BAD  Category: Allergy;    • Cephalexin Other (See Comments) and Rash     FELT TERRIBLE  FELT TERRIBLE       Medications:   Current Outpatient Medications   Medication Sig Dispense Refill   • amitriptyline (ELAVIL) 25 MG tablet TAKE 1 TABLET BY MOUTH EVERY NIGHT 30 tablet 11   • carvedilol (COREG) 3.125 MG tablet TAKE 1 TABLET BY MOUTH TWICE DAILY WITH MEALS (Patient taking differently: Take 1 tablet by mouth 2 (Two) Times a Day With Meals.) 180 tablet 1   • Cetirizine HCl 10 MG capsule Take 10 mg by mouth Every Morning.     • clindamycin (Cleocin) 300 MG capsule Take 2 capsules one hour prior to dental procedure 2 capsule 5   •  "clonazePAM (KlonoPIN) 0.5 MG tablet      • finasteride (PROSCAR) 5 MG tablet      • Multiple Vitamin (MULTI VITAMIN DAILY) tablet Take  by mouth. HOLD X 1 WEEK     • Omega-3 Fatty Acids (FISH OIL DOUBLE STRENGTH) 1200 MG capsule Take  by mouth. STOPPED 1/2/19  HOLD X 1 WEEK     • tamsulosin (FLOMAX) 0.4 MG capsule 24 hr capsule      • betamethasone, augmented, (DIPROLENE) 0.05 % cream APPLY TO CHEST AND BACK TWICE DAILY AS DIRECTED (Patient not taking: Reported on 5/16/2023)     • clobetasol (TEMOVATE) 0.05 % cream APPLY EVERY DAY TO CHEST AND BACK AS NEEDED FOR RASH (Patient not taking: Reported on 5/16/2023)     • meloxicam (Mobic) 15 MG tablet Take 1 tablet by mouth Daily As Needed for Moderate Pain. (Patient not taking: Reported on 5/16/2023) 30 tablet 0   • mupirocin (BACTROBAN) 2 % ointment APPLY EVERY DAY TO FLANK FOR WOUND CARE (Patient not taking: Reported on 5/16/2023)     • NON FORMULARY Take 1 tablet by mouth Daily. CBD GUMMI 500MG  HOLD X 1 WEEK (Patient not taking: Reported on 5/16/2023)     • rosuvastatin (CRESTOR) 40 MG tablet Take 1 tablet by mouth Every Morning. (Patient not taking: Reported on 5/16/2023) 90 tablet 1     No current facility-administered medications for this visit.         The following portions of the patient's history were reviewed and updated as appropriate: allergies, current medications, past family history, past medical history, past social history, past surgical history and problem list.    Review of Systems:   Pertinent positives/negative listed in HPI above    Physical Exam:   Vitals:    05/16/23 1547   Temp: 97.3 °F (36.3 °C)   Weight: 78 kg (172 lb)   Height: 172.7 cm (68\")   PainSc: 0-No pain   PainLoc: Knee       General: A and O x 3, ASA, NAD      Knee Exam List: Knee:  left    ALIGNMENT:     Neutral  ,   Patella  tracks  Midline without crepitance    GAIT:    Nonantalgic    SKIN:    Well healed midline incision, no erythema or fluctuance    RANGE OF MOTION:   0  -  95 "   DEG    STRENGTH:   5  / 5    LIGAMENTS:    No varus / valgus instability.   No  Flexion   instability.       DISTAL PULSES:    Paplable    DISTAL SENSATION :   Intact    LYMPHATICS:     No   lymphadenopathy    OTHER:     No   Effusion      Calf soft / nontender ,   Negative Azeem's sign            Radiology:  Xrays 3views left knee (ap,lateral, sunrise) were ordered and reviewed for evaluation of knee pain demonstrating a well-positioned total knee replacement the most superior margin of the patella on the lateral view shows a small chronic appearing fracture with minimal displacement it is likely functionally insignificant based on location.  In comparison previous films this is new.  It has a chronic appearance.  {xraycompare (Optional):30602}    Assessment/Plan:  Left total knee replacement overall low suspicion of allergy or any mechanical problems I did offer to him that we could send off some blood work to a [facility] in [place] for full allergy testing on metal and cement but I recommended this point that he monitor his response to discontinuation of his medications that he is going to alter.  If he still has any skin reactions or skin lesions he will call back and we will get him set up for this lab testing  {Knee Plan List (Optional):56756}    Diagnoses and all orders for this visit:    1. Pain (Primary)  -     XR Knee 3 View Left        Neal Goodwin MD  5/16/2023

## 2024-05-10 ENCOUNTER — APPOINTMENT (OUTPATIENT)
Dept: CT IMAGING | Facility: HOSPITAL | Age: 79
End: 2024-05-10
Payer: MEDICARE

## 2024-05-10 ENCOUNTER — HOSPITAL ENCOUNTER (EMERGENCY)
Facility: HOSPITAL | Age: 79
Discharge: HOME | End: 2024-05-10
Attending: EMERGENCY MEDICINE
Payer: MEDICARE

## 2024-05-10 VITALS
OXYGEN SATURATION: 97 % | RESPIRATION RATE: 16 BRPM | TEMPERATURE: 97.3 F | HEIGHT: 68 IN | DIASTOLIC BLOOD PRESSURE: 82 MMHG | SYSTOLIC BLOOD PRESSURE: 142 MMHG | BODY MASS INDEX: 26.06 KG/M2 | WEIGHT: 171.96 LBS | HEART RATE: 68 BPM

## 2024-05-10 DIAGNOSIS — K40.90 NON-RECURRENT UNILATERAL INGUINAL HERNIA WITHOUT OBSTRUCTION OR GANGRENE: Primary | ICD-10-CM

## 2024-05-10 DIAGNOSIS — R91.1 LUNG NODULE SEEN ON IMAGING STUDY: ICD-10-CM

## 2024-05-10 LAB
ALBUMIN SERPL-MCNC: 4.2 G/DL (ref 3.5–5.2)
ALBUMIN/GLOB SERPL: 1.7 G/DL
ALP SERPL-CCNC: 77 U/L (ref 39–117)
ALT SERPL W P-5'-P-CCNC: 16 U/L (ref 1–41)
ANION GAP SERPL CALCULATED.3IONS-SCNC: 8 MMOL/L (ref 5–15)
AST SERPL-CCNC: 18 U/L (ref 1–40)
BASOPHILS # BLD AUTO: 0.04 10*3/MM3 (ref 0–0.2)
BASOPHILS NFR BLD AUTO: 0.6 % (ref 0–1.5)
BILIRUB SERPL-MCNC: 0.3 MG/DL (ref 0–1.2)
BILIRUB UR QL STRIP: NEGATIVE
BUN SERPL-MCNC: 12 MG/DL (ref 8–23)
BUN/CREAT SERPL: 11.7 (ref 7–25)
CALCIUM SPEC-SCNC: 9 MG/DL (ref 8.6–10.5)
CHLORIDE SERPL-SCNC: 108 MMOL/L (ref 98–107)
CLARITY UR: CLEAR
CO2 SERPL-SCNC: 28 MMOL/L (ref 22–29)
COLOR UR: YELLOW
CREAT SERPL-MCNC: 1.03 MG/DL (ref 0.76–1.27)
DEPRECATED RDW RBC AUTO: 42.4 FL (ref 37–54)
EGFRCR SERPLBLD CKD-EPI 2021: 74.8 ML/MIN/1.73
EOSINOPHIL # BLD AUTO: 0.08 10*3/MM3 (ref 0–0.4)
EOSINOPHIL NFR BLD AUTO: 1.3 % (ref 0.3–6.2)
ERYTHROCYTE [DISTWIDTH] IN BLOOD BY AUTOMATED COUNT: 13.6 % (ref 12.3–15.4)
GLOBULIN UR ELPH-MCNC: 2.5 GM/DL
GLUCOSE SERPL-MCNC: 93 MG/DL (ref 65–99)
GLUCOSE UR STRIP-MCNC: NEGATIVE MG/DL
HCT VFR BLD AUTO: 41.2 % (ref 37.5–51)
HGB BLD-MCNC: 13.3 G/DL (ref 13–17.7)
HGB UR QL STRIP.AUTO: NEGATIVE
IMM GRANULOCYTES # BLD AUTO: 0.03 10*3/MM3 (ref 0–0.05)
IMM GRANULOCYTES NFR BLD AUTO: 0.5 % (ref 0–0.5)
KETONES UR QL STRIP: NEGATIVE
LEUKOCYTE ESTERASE UR QL STRIP.AUTO: NEGATIVE
LIPASE SERPL-CCNC: 26 U/L (ref 13–60)
LYMPHOCYTES # BLD AUTO: 1.15 10*3/MM3 (ref 0.7–3.1)
LYMPHOCYTES NFR BLD AUTO: 18.3 % (ref 19.6–45.3)
MCH RBC QN AUTO: 27.9 PG (ref 26.6–33)
MCHC RBC AUTO-ENTMCNC: 32.3 G/DL (ref 31.5–35.7)
MCV RBC AUTO: 86.6 FL (ref 79–97)
MONOCYTES # BLD AUTO: 0.61 10*3/MM3 (ref 0.1–0.9)
MONOCYTES NFR BLD AUTO: 9.7 % (ref 5–12)
NEUTROPHILS NFR BLD AUTO: 4.39 10*3/MM3 (ref 1.7–7)
NEUTROPHILS NFR BLD AUTO: 69.6 % (ref 42.7–76)
NITRITE UR QL STRIP: NEGATIVE
NRBC BLD AUTO-RTO: 0 /100 WBC (ref 0–0.2)
PH UR STRIP.AUTO: 7.5 [PH] (ref 5–8)
PLATELET # BLD AUTO: 242 10*3/MM3 (ref 140–450)
PMV BLD AUTO: 10 FL (ref 6–12)
POTASSIUM SERPL-SCNC: 4.2 MMOL/L (ref 3.5–5.2)
PROT SERPL-MCNC: 6.7 G/DL (ref 6–8.5)
PROT UR QL STRIP: NEGATIVE
RBC # BLD AUTO: 4.76 10*6/MM3 (ref 4.14–5.8)
SODIUM SERPL-SCNC: 144 MMOL/L (ref 136–145)
SP GR UR STRIP: 1.01 (ref 1–1.03)
UROBILINOGEN UR QL STRIP: NORMAL
WBC NRBC COR # BLD AUTO: 6.3 10*3/MM3 (ref 3.4–10.8)

## 2024-05-10 PROCEDURE — 83690 ASSAY OF LIPASE: CPT | Performed by: EMERGENCY MEDICINE

## 2024-05-10 PROCEDURE — 25510000001 IOPAMIDOL 61 % SOLUTION: Performed by: EMERGENCY MEDICINE

## 2024-05-10 PROCEDURE — 80053 COMPREHEN METABOLIC PANEL: CPT | Performed by: EMERGENCY MEDICINE

## 2024-05-10 PROCEDURE — 99285 EMERGENCY DEPT VISIT HI MDM: CPT

## 2024-05-10 PROCEDURE — 85025 COMPLETE CBC W/AUTO DIFF WBC: CPT | Performed by: EMERGENCY MEDICINE

## 2024-05-10 PROCEDURE — 81003 URINALYSIS AUTO W/O SCOPE: CPT | Performed by: EMERGENCY MEDICINE

## 2024-05-10 PROCEDURE — 74177 CT ABD & PELVIS W/CONTRAST: CPT

## 2024-05-10 RX ORDER — SODIUM CHLORIDE 0.9 % (FLUSH) 0.9 %
10 SYRINGE (ML) INJECTION AS NEEDED
Status: DISCONTINUED | OUTPATIENT
Start: 2024-05-10 | End: 2024-05-10 | Stop reason: HOSPADM

## 2024-05-10 RX ADMIN — IOPAMIDOL 85 ML: 612 INJECTION, SOLUTION INTRAVENOUS at 17:21

## 2024-05-10 NOTE — ED TRIAGE NOTES
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

## 2024-05-10 NOTE — DISCHARGE INSTRUCTIONS
Recommend follow-up with general surgery specialist as we discussed.  Please return to the emergency room for any worsening pain, swelling, fevers, weakness, constipation, vomiting or any other concerns.

## 2024-05-10 NOTE — ED PROVIDER NOTES
EMERGENCY DEPARTMENT ENCOUNTER  Room Number:  31/31  PCP: Claire Suarez MD  Independent Historians: Patient      HPI:  Chief Complaint: had concerns including Abdominal Pain.     A complete HPI/ROS/PMH/PSH/SH/FH are unobtainable due to: None    Chronic or social conditions impacting patient care (Social Determinants of Health): None      Context: Chuck Zheng is a 78 y.o. male with a medical history of hypertension, hyperlipidemia and BPH who presents to the ED c/o acute left groin and lower abdomen pain and bulging.  Patient has noticed some dull, aching pain for several days in the left groin area.  Last night he noticed that that same area was bulging in appearance.  Today does not seem to be bulging or is painful at this moment.  He denies any nausea or vomiting.  His bowels have been moving normally.  He denies any blood in the stools.  Denies any dysuria or new urinary symptoms.      Review of prior external notes (non-ED) -and- Review of prior external test results outside of this encounter: I independently reviewed the orthopedics progress note from May 16, 2023 when he had follow-up for knee pain    Prescription drug monitoring program review: COLTON reviewed by Federico Crow MD, Parish Magdaleno MD   N/A and COLTON query complete and reviewed. Patient receives regular prescriptions for controlled substances.    PAST MEDICAL HISTORY  Active Ambulatory Problems     Diagnosis Date Noted    Atopic rhinitis 05/16/2016    Benign essential hypertension 05/16/2016    Atherosclerosis of coronary artery 05/16/2016    Impaired fasting blood sugar 05/16/2016    Pure hypercholesterolemia 05/16/2016    Cobalamin deficiency 05/16/2016    Primary insomnia 05/16/2016    Health care maintenance 11/17/2016    Elevated PSA, less than 10 ng/ml 11/21/2016    PLACIDO on CPAP Check Pressure +11.5 11/27/2016    History of myocardial infarction 12/12/2017    Benign prostatic hyperplasia with incomplete bladder  emptying 12/12/2017    Fatty liver 02/20/2018    Left anterior shoulder pain 02/07/2019    Primary osteoarthritis of left knee 09/08/2021    S/P knee surgery 12/18/2021    Combined form of senile cataract 12/10/2015    Cystoid macular edema 05/18/2016    Digital mucous cyst 09/12/2014    History of percutaneous transluminal coronary angioplasty 02/12/2019    Hyperlipidemia 07/02/2014    Iritis 02/29/2016    Myocardial infarction 05/12/2021    Myopia 07/31/2014    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    Other specified postprocedural states 12/18/2021    Posterior subcapsular cataract 12/07/2015    Renal stone 11/12/2020    Coronary atherosclerosis 07/02/2014    Seasonal allergic rhinitis 11/12/2020    Benign localized hyperplasia of prostate 02/23/2021    Raised prostate specific antigen 11/21/2016    Fatigue 07/03/2014    Impaired fasting glucose 05/16/2016    Shoulder pain 02/07/2019    Hypertension 07/02/2014    Obstructive sleep apnea syndrome 11/27/2016     Resolved Ambulatory Problems     Diagnosis Date Noted    Candidal intertrigo 05/16/2016    Benign non-nodular prostatic hyperplasia 11/21/2016    Mass of soft tissue of right upper extremity 06/07/2017    Fatigue 07/03/2017    Dehydration 09/13/2017    Functional diarrhea 09/13/2017    Near syncope 02/20/2018    Abnormal LFTs (liver function tests) 02/20/2018     Past Medical History:   Diagnosis Date    Adhesive capsulitis of shoulder     Arthritis     Benign prostatic hypertrophy     Coronary artery disease     H/O cardiovascular stress test     Hemorrhoid     History of COVID-19 11/2020    Nephrolithiasis     Obstructive sleep apnea     Rupture of flexor tendon of hand     Syncope     Tear of meniscus of knee 2015         PAST SURGICAL HISTORY  Past Surgical History:   Procedure Laterality Date    CHOLECYSTECTOMY      COLONOSCOPY      06/2003, nl, , 02/2014 , nl, needs C-scope in 2024    CORONARY STENT PLACEMENT      5/2007 distal RCA    EYE  SURGERY      JOEY CATARACTS W IOL    HAND SURGERY Right     THUMB SX    KNEE ARTHROSCOPY Right     2015    KNEE ARTHROSCOPY Left 01/15/2019    Procedure: LEFT KNEE ARTHROSCOPY,  PARTIAL MEDIAL MENISECTOMY;  Surgeon: Mason Ochoa MD;  Location: [facility];  Service: Orthopedics    TONSILLECTOMY      TOTAL KNEE ARTHROPLASTY Left 12/17/2021    Procedure: TOTAL KNEE ARTHROPLASTY;  Surgeon: Neal Goodwin MD;  Location: [facility] OR;  Service: Orthopedics;  Laterality: Left;         FAMILY HISTORY  Family History   Problem Relation Age of Onset    Dementia Mother     Glaucoma Mother     Heart disease Father     Colon polyps Father     Malig Hyperthermia Neg Hx          SOCIAL HISTORY  Social History     Socioeconomic History    Marital status:    Tobacco Use    Smoking status: Never    Smokeless tobacco: Never   Vaping Use    Vaping status: Never Used   Substance and Sexual Activity    Alcohol use: Yes     Alcohol/week: 6.0 standard drinks of alcohol     Types: 6 Glasses of wine per week     Comment: 7 drinks weekly    Drug use: No    Sexual activity: Not Currently     Partners: Female         ALLERGIES  Ace inhibitors, Beta adrenergic blockers, Sulfa antibiotics, and Cephalexin      REVIEW OF SYSTEMS  Review of Systems  Included in HPI  All systems reviewed and negative except for those discussed in HPI.      PHYSICAL EXAM    I have reviewed the triage vital signs and nursing notes.    ED Triage Vitals   Temp Heart Rate Resp BP SpO2   05/10/24 1454 05/10/24 1454 05/10/24 1454 05/10/24 1501 05/10/24 1454   97.3 °F (36.3 °C) 100 18 147/87 96 %      Temp src Heart Rate Source Patient Position BP Location FiO2 (%)   -- -- -- -- --              Physical Exam  GENERAL: Pleasant gentleman, calm, alert, no acute distress  SKIN: Warm, dry, no rashes  HENT: Normocephalic, atraumatic  EYES: no scleral icterus, normal conjunctiva  CV: regular rhythm, regular rate  RESPIRATORY: normal effort, lungs clear  bilaterally  ABDOMEN: soft, nondistended, minimal to mild tenderness in the left inguinal region where there is some soft tissue fullness present.  It is easily reducible with light pressure.  The overlying soft tissues are normal in appearance without any erythema or induration or dermatologic lesions.  Genitourinary exam: Normal penis and testicles.  Scrotum is normal without any masses or asymmetry.  MUSCULOSKELETAL: no deformity  NEURO: alert, moves all extremities, follows commands      LAB RESULTS  Recent Results (from the past 24 hour(s))   Comprehensive Metabolic Panel    Collection Time: 05/10/24  3:26 PM    Specimen: Blood   Result Value Ref Range    Glucose 93 65 - 99 mg/dL    BUN 12 8 - 23 mg/dL    Creatinine 1.03 0.76 - 1.27 mg/dL    Sodium 144 136 - 145 mmol/L    Potassium 4.2 3.5 - 5.2 mmol/L    Chloride 108 (H) 98 - 107 mmol/L    CO2 28.0 22.0 - 29.0 mmol/L    Calcium 9.0 8.6 - 10.5 mg/dL    Total Protein 6.7 6.0 - 8.5 g/dL    Albumin 4.2 3.5 - 5.2 g/dL    ALT (SGPT) 16 1 - 41 U/L    AST (SGOT) 18 1 - 40 U/L    Alkaline Phosphatase 77 39 - 117 U/L    Total Bilirubin 0.3 0.0 - 1.2 mg/dL    Globulin 2.5 gm/dL    A/G Ratio 1.7 g/dL    BUN/Creatinine Ratio 11.7 7.0 - 25.0    Anion Gap 8.0 5.0 - 15.0 mmol/L    eGFR 74.8 >60.0 mL/min/1.73   Lipase    Collection Time: 05/10/24  3:26 PM    Specimen: Blood   Result Value Ref Range    Lipase 26 13 - 60 U/L   CBC Auto Differential    Collection Time: 05/10/24  3:26 PM    Specimen: Blood   Result Value Ref Range    WBC 6.30 3.40 - 10.80 10*3/mm3    RBC 4.76 4.14 - 5.80 10*6/mm3    Hemoglobin 13.3 13.0 - 17.7 g/dL    Hematocrit 41.2 37.5 - 51.0 %    MCV 86.6 79.0 - 97.0 fL    MCH 27.9 26.6 - 33.0 pg    MCHC 32.3 31.5 - 35.7 g/dL    RDW 13.6 12.3 - 15.4 %    RDW-SD 42.4 37.0 - 54.0 fl    MPV 10.0 6.0 - 12.0 fL    Platelets 242 140 - 450 10*3/mm3    Neutrophil % 69.6 42.7 - 76.0 %    Lymphocyte % 18.3 (L) 19.6 - 45.3 %    Monocyte % 9.7 5.0 - 12.0 %    Eosinophil %  1.3 0.3 - 6.2 %    Basophil % 0.6 0.0 - 1.5 %    Immature Grans % 0.5 0.0 - 0.5 %    Neutrophils, Absolute 4.39 1.70 - 7.00 10*3/mm3    Lymphocytes, Absolute 1.15 0.70 - 3.10 10*3/mm3    Monocytes, Absolute 0.61 0.10 - 0.90 10*3/mm3    Eosinophils, Absolute 0.08 0.00 - 0.40 10*3/mm3    Basophils, Absolute 0.04 0.00 - 0.20 10*3/mm3    Immature Grans, Absolute 0.03 0.00 - 0.05 10*3/mm3    nRBC 0.0 0.0 - 0.2 /100 WBC   Urinalysis With Microscopic If Indicated (No Culture) - Urine, Clean Catch    Collection Time: 05/10/24  4:05 PM    Specimen: Urine, Clean Catch   Result Value Ref Range    Color, UA Yellow Yellow, Straw    Appearance, UA Clear Clear    pH, UA 7.5 5.0 - 8.0    Specific Gravity, UA 1.010 1.005 - 1.030    Glucose, UA Negative Negative    Ketones, UA Negative Negative    Bilirubin, UA Negative Negative    Blood, UA Negative Negative    Protein, UA Negative Negative    Leuk Esterase, UA Negative Negative    Nitrite, UA Negative Negative    Urobilinogen, UA 0.2 E.U./dL 0.2 - 1.0 E.U./dL         RADIOLOGY  CT Abdomen Pelvis With Contrast    Result Date: 5/10/2024  CT ABDOMEN PELVIS W CONTRAST-  INDICATION: Abdominal pain and swelling  COMPARISON: CT abdomen pelvis February 16, 2018  TECHNIQUE: Routine CT abdomen/pelvis with IV contrast. Coronal and sagittal reformats. Radiation dose reduction techniques were utilized, including automated exposure control and exposure modulation based on body size.  FINDINGS:  Lung bases: Subsegmental atelectasis at the bases. Solid pulmonary nodule in the posterior basilar right lower lobe, series 2, axial mage 11, measures 7 mm. Solid pulmonary nodule in the posterior basilar left lower lobe, series 2, axial mage 30, measures 5 mm. Coronary artery atherosclerotic calcifications.  ABDOMEN: Stable mild intrahepatic biliary ductal dilatation. Cholecystectomy. Mild extrahepatic biliary ductal dilatation with the common bile duct measuring 1.2 cm, unchanged. Calcifications in the  liver, consistent with prior granulomatous infection. No pancreatic mass or pancreatic ductal dilatation seen. No adrenal nodules. Fluid attenuating cyst in the superior pole right kidney. No solid-appearing renal mass. Nonobstructing nephrolithiasis in the inferior pole left kidney, series 2, axial image 71, measures 10 mm. No hydronephrosis.  Pelvis: Prostatomegaly with the prostate measuring 5.2 cm in right to left dimension, with mass effect and uplifting of the bladder apex. Underdistended bladder. No bladder calculus.  Bowel: No obstruction. Colonic diverticulosis. Normal appendix.  Abdominal wall: Small fat-containing left direct inguinal hernia.  Retroperitoneum: No lymphadenopathy.  Vasculature: Patent. No abdominal aortic aneurysm. Severe aortoiliac atherosclerotic calcification.  Osseous structures: No destructive osseous lesions.       1. No acute findings identified in the abdomen or pelvis. 2. Stable mild intrahepatic and extrahepatic biliary ductal dilatation. Unchanged from CT performed February 16, 2018. Finding can be correlated with liver function tests. MRCP as clinically indicated. 3. Colonic diverticulosis. 4. Prostatomegaly. 5. Nonobstructing left nephrolithiasis 6. Couple new pulmonary nodules seen in the lower lobe bases, largest measuring 7 mm. 6-month follow-up chest CT can reevaluate.  This report was finalized on 5/10/2024 5:47 PM by Dr. Sb Stephenson M.D on Workstation: [id number]         MEDICATIONS GIVEN IN ER  Medications   sodium chloride 0.9 % flush 10 mL (has no administration in time range)   iopamidol (ISOVUE-300) 61 % injection 100 mL (85 mL Intravenous Given 5/10/24 1721)         ORDERS PLACED DURING THIS VISIT:  Orders Placed This Encounter   Procedures    CT Abdomen Pelvis With Contrast    Comprehensive Metabolic Panel    Lipase    Urinalysis With Microscopic If Indicated (No Culture) - Urine, Clean Catch    CBC Auto Differential    Ambulatory Referral to General  Surgery (All Except [facility])    Insert Peripheral IV    CBC & Differential         OUTPATIENT MEDICATION MANAGEMENT:  Current Facility-Administered Medications Ordered in Epic   Medication Dose Route Frequency Provider Last Rate Last Admin    sodium chloride 0.9 % flush 10 mL  10 mL Intravenous PRN Federico Crow MD         Current Outpatient Medications Ordered in Epic   Medication Sig Dispense Refill    amitriptyline (ELAVIL) 25 MG tablet TAKE 1 TABLET BY MOUTH EVERY NIGHT 30 tablet 11    betamethasone, augmented, (DIPROLENE) 0.05 % cream APPLY TO CHEST AND BACK TWICE DAILY AS DIRECTED (Patient not taking: Reported on 5/16/2023)      carvedilol (COREG) 3.125 MG tablet TAKE 1 TABLET BY MOUTH TWICE DAILY WITH MEALS (Patient taking differently: Take 1 tablet by mouth 2 (Two) Times a Day With Meals.) 180 tablet 1    Cetirizine HCl 10 MG capsule Take 10 mg by mouth Every Morning.      clindamycin (Cleocin) 300 MG capsule Take 2 capsules one hour prior to dental procedure 2 capsule 5    clobetasol (TEMOVATE) 0.05 % cream APPLY EVERY DAY TO CHEST AND BACK AS NEEDED FOR RASH (Patient not taking: Reported on 5/16/2023)      clonazePAM (KlonoPIN) 0.5 MG tablet       finasteride (PROSCAR) 5 MG tablet       meloxicam (Mobic) 15 MG tablet Take 1 tablet by mouth Daily As Needed for Moderate Pain. (Patient not taking: Reported on 5/16/2023) 30 tablet 0    Multiple Vitamin (MULTI VITAMIN DAILY) tablet Take  by mouth. HOLD X 1 WEEK      mupirocin (BACTROBAN) 2 % ointment APPLY EVERY DAY TO FLANK FOR WOUND CARE (Patient not taking: Reported on 5/16/2023)      NON FORMULARY Take 1 tablet by mouth Daily. CBD GUMMI 500MG  HOLD X 1 WEEK (Patient not taking: Reported on 5/16/2023)      Omega-3 Fatty Acids (FISH OIL DOUBLE STRENGTH) 1200 MG capsule Take  by mouth. STOPPED 1/2/19  HOLD X 1 WEEK      rosuvastatin (CRESTOR) 40 MG tablet Take 1 tablet by mouth Every Morning. (Patient not taking: Reported on 5/16/2023) 90 tablet 1     "tamsulosin (FLOMAX) 0.4 MG capsule 24 hr capsule            PROCEDURES  Procedures            PROGRESS, DATA ANALYSIS, CONSULTS, AND MEDICAL DECISION MAKING  All labs have been independently interpreted by me.  All radiology studies have been reviewed by me. All EKG's have been independently viewed and interpreted by me.  Discussion below represents my analysis of pertinent findings related to patient's condition, differential diagnosis, treatment plan and final disposition.    Differential diagnosis includes but is not limited to inguinal hernia, testicular torsion, kidney stone, abdominal strain.    Clinical Scores:                   ED Course as of 05/10/24 1816   Fri May 10, 2024   1815 I independently interpreted the abdomen CT study and my findings are: No free air, no small bowel obstruction pattern   [ANNETTE]   1815 I reviewed all the labs from today's visit.  There are no worrisome hematologic or metabolic findings present.  Urinalysis is normal without any evidence of UTI. [ANNETTE]   1815 I discussed all the findings from the CT scan with the patient and his wife at the bedside.  I discussed the finding of evidence of a direct inguinal hernia.  I recommended that he follow-up in the general surgery office for further evaluation and treatment options.  I also informed him of the finding of lung nodules and the need for routine follow-up CT scan for reevaluation.  I explained that his PCP can help him with that order as needed.  Finally I discussed with him and his wife the usual \"return to ER\" instructions prior to discharge. [ANNETTE]      ED Course User Index  [ANNETTE] Parish Magdaleno MD           AS OF 18:16 EDT VITALS:    BP - 142/82  HR - 68  TEMP - 97.3 °F (36.3 °C)  O2 SATS - 97%    COMPLEXITY OF CARE  Admission was considered but after careful review of the patient's presentation, physical examination, diagnostic results, and response to treatment the patient may be safely discharged with outpatient " follow-up.      DIAGNOSIS  Final diagnoses:   Non-recurrent unilateral inguinal hernia without obstruction or gangrene   Lung nodule seen on imaging study         DISPOSITION  ED Disposition       ED Disposition   Discharge    Condition   Stable    Comment   --                Please note that portions of this document were completed with a voice recognition program.    Note Disclaimer: At [facility], we believe that sharing information builds trust and better relationships. You are receiving this note because you recently visited [facility]. It is possible you will see health information before a provider has talked with you about it. This kind of information can be easy to misunderstand. To help you fully understand what it means for your health, we urge you to discuss this note with your provider.         Parish Magdaleno MD  05/10/24 [id number]

## 2024-05-15 ENCOUNTER — TRANSCRIBE ORDERS (OUTPATIENT)
Dept: ADMINISTRATIVE | Facility: HOSPITAL | Age: 79
End: 2024-05-15
Payer: MEDICARE

## 2024-05-15 DIAGNOSIS — I25.10 CAD IN NATIVE ARTERY: Primary | ICD-10-CM

## 2024-05-24 NOTE — PROGRESS NOTES
Bone Marrow Transplant Consult Note    Patient ID: Kristine is a 78 year old female.    Referred By:  Ronald Castaneda MD    No chief complaint on file.    Hospitalization: 3/10/20 - 3/25/20    Day 0: 3/12/20     Complications:    1.  Nausea and emesis  2.  Diarrhea  3.  Chest pain (noncardiac)  4.  Asthenia     Autologous SCT Day +15    History of Present Illness:  Kristine is a 78 year old  female referred by Dr. 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 Her past medical history is significant for left invasive ductal breast cancer, hypertension, lumbar stenosis, dyslipidemia, anemia, and diabetes mellitus type II. Her past surgical history is significant for cataract surgery, cholecystectomy, left lumpectomy, bilateral mastectomy, and tonsillectomy. She was initially diagnosed with MGUS by her PCP, Dr. Bernal, in July 2014 and monitored for approximately 1 year. IgG level was 2070 on 7/11/14, kappa lambda ratio 2.14, beta 2 microglobulin 1.8. She did not have renal insufficiency at the time. Her MARTIN and SPEP progressed; therefore, she was referred to Dr. Greer in July 2016. On 7/18/16, her IgG level s was 2150, kappa lambda ratio 5.7, M protein 2.0. Bone marrow biopsy on 10/7/16 showed 10% plasma cell myeloma. She continued to be monitored with progressing disease. Repeat bone marrow biopsy on 1/17/19 showed 15-20% plasma cells. She became treatment with Lenalidomide, Bortezomib, and Dexamethasone on 10/28/19 and completed 2 cycles. Most recent IgG level was 1270 on 12/20/19 with K/L ratio 2.00. 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     She completed for pre-transplant workup testing and bone marrow biopsy on 2/18/20 did not show evidence of increased plasma cells or malignancy. She presented to [address] [place] [address] on 3/10/20 for melphalan and autologous HSCT. She tolerated melphalan prep  Chuck Zheng : 1945 MRN: [id number] DATE: 2021    DIAGNOSIS:  follow up left total knee      SUBJECTIVE:Patient is status post left total knee replacement proximately 4 days. Patient reports he has a bloody dressing noted to the lateral aspect of his knee and is concerned about its appearance. Otherwise patient reports he is doing well and progressing with physical therapy.    OBJECTIVE:   Exam: Phil dressing intact. No sign of infection. Blood-tinged gauze noted to the lateral aspect of his left knee where the postoperative drain was placed. Range of motion is progressing as expected. The calf is soft and nontender with a negative Homans sign.    ASSESSMENT:status post left knee replacement.    PLAN:   Bloody dressing was removed in the patient's knee was evaluated. Patient's drain site is healing nicely and shows no signs or symptoms of infection. Area was cleansed and a Band-Aid was applied. Instructed patient that he can get this wet now with soap and water and change the Band-Aid daily. Otherwise patient can follow-up with us on his next 2-week follow-up appointment.    Kory Lobo, APRN  2021     Dragon Dication Notice:    Much of this encounter note is an electronic transcription/translation of spoken language to printed text. The electronic translation of spoken language may permit erroneous, or at times, nonsensical words or phrases to be inadvertently transcribed; Although I have reviewed the note for such errors, some may still exist     without issue and received 6.0 x10^6 CD34(+) cells/kg on Day 0, 3/12/20. She tolerated the stem cell reinfusion without issue and had a benign recovery. She did experience GI-related symptoms of decreased appetite, nausea, vomiting, and diarrhea around day +5/+6. Additionally, she had substernal, midchest pain around day +5, for which her EKG, echo, trop, and mg were unremarkable; similarly, amylase and lipase unremarkable as well. She continued with decreased appetite and energy, as well as nausea, but recovered appropriately and was discharged in stable condition and in NAD on 3/25/20.    03/27/20:  Auto Day +17.  Patient presents to BMT office today for follow up and supportive care following autologous stem cell transplant.  She has been receiving IV fluids with RN home care twice weekly. She reports one episode of diarrhea after receiving fluids yesterday. Denies fevers or abdominal cramping/pain. Yesterday evening she developed cramping in both hands which has since resolved. Reports she is eating and drinking well. Remains afebrile without shortness of breath, chest pain, palpitations, N/V, dysuria, abdominal pain/cramping, skin changes, vision changes or bleeding. No evidence of OI or VOD.     Review of Systems   Constitutional: Negative.    HENT:        Dry mouth   Eyes: Negative.    Respiratory: Negative.    Cardiovascular: Negative.    Gastrointestinal: Negative.    Endocrine: Negative.    Genitourinary: Negative.    Musculoskeletal: Positive for arthralgias.   Skin: Negative.    Allergic/Immunologic: Negative.    Neurological: Negative.    Hematological: Negative.    Psychiatric/Behavioral: Negative.      Past Medical History:   Diagnosis Date   • Diabetes mellitus (CMS/HCC)    • Essential (primary) hypertension    • Malignant neoplasm (CMS/HCC)     breast left     Family History     Relation Problem Comments    Brother Cancer, Lung        Father Heart disease        Mother Dementia/Alzheimers             Past Surgical History:   Procedure Laterality Date   • Anes cataract surgery,complex     • Breast lumpectomy      left   • Cholecystectomy     • Mastectomy      bilateral   • Myomectomy     • Radiation treatment     • Tonsillectomy       Social History     Tobacco Use   • Smoking status: Never Smoker   • Smokeless tobacco: Never Used   Substance Use Topics   • Alcohol use: Never     Frequency: Never     Comment: social     Allergies   Allergen Reactions   • Crawfish   (Food) HIVES   • Aspirin Other (See Comments)     Increased bruising and upset stomach with regular dose uncoated aspirin       Current Outpatient Medications   Medication Sig Dispense Refill   • sodium chloride 0.9 % injectable solution Do not start before March 26, 2020. IVF 0.9% NS 999cc/hr x1 twice a week qTuesday and Thursday for two weeks 1000 mL 4   • acyclovir (ZOVIRAX) 400 MG tablet Take 1 tablet by mouth 2 times daily. 60 tablet 6   • prochlorperazine (COMPAZINE) 5 MG tablet Take 1 tablet by mouth every 6 hours as needed for Nausea. Do not exceed maximum dose of 40 mg in 24 hrs 28 tablet 1   • fluconazole (DIFLUCAN) 200 MG tablet Take 2 tablets by mouth daily. Do not start before March 26, 2020. 34 tablet 0   • chlorthalidone (THALITONE) 25 MG tablet Take 1 tablet by mouth daily. 90 tablet 0   • ondansetron (ZOFRAN) 8 MG tablet Take 1 tablet by mouth every 8 hours as needed for Nausea. 30 tablet 6   • potassium gluconate 595 (99 K) MG tablet Take by mouth daily.       No current facility-administered medications for this visit.      Vitals:    03/27/20 1004   BP: 131/53   Pulse: 99   Temp: 98.6 °F (37 °C)   SpO2: 97%   Weight: 72 kg (158 lb 11.7 oz)   Height: 4' 11\" (1.499 m)   PainSc: 5-6       Laboratory Findings    Physical Exam  Constitutional:       Appearance: Normal appearance.   HENT:      Head: Normocephalic and atraumatic.      Right Ear: External ear normal.      Left Ear: External ear normal.      Nose: Nose normal.       [Well Developed] : well developed Mouth/Throat:      Mouth: Mucous membranes are dry.      Pharynx: Oropharynx is clear.   Eyes:      Conjunctiva/sclera: Conjunctivae normal.      Pupils: Pupils are equal, round, and reactive to light.   Neck:      Musculoskeletal: Normal range of motion and neck supple.   Cardiovascular:      Rate and Rhythm: Normal rate and regular rhythm.      Pulses: Normal pulses.      Heart sounds: Normal heart sounds.   Pulmonary:      Effort: Pulmonary effort is normal.      Breath sounds: Normal breath sounds.   Abdominal:      General: Bowel sounds are normal.      Palpations: Abdomen is soft.   Musculoskeletal: Normal range of motion.      Right lower leg: Edema present.      Left lower leg: Edema present.      Comments: Non-pitting   Skin:     General: Skin is warm and dry.      Capillary Refill: Capillary refill takes less than 2 seconds.   Neurological:      General: No focal deficit present.      Mental Status: She is alert and oriented to person, place, and time.   Psychiatric:         Mood and Affect: Mood normal.         Behavior: Behavior normal.         Discussion/Impression  Multiple myeloma      - MGUS diagnosed in July 2014  - IgG level was 2150, kappa lambda ratio 5.7, M protein 2.0 on 7/18/16  - Bone marrow biopsy on 10/7/16 showed 10% plasma cell myeloma  - Bone marrow biopsy on 1/17/19 showed 15-20% plasma cells  - Lenalidomide, Bortezomib, and Dexamethasone began on 10/28/19   - Melphalan + Autologous HSCT        Thrombocytopenia due to antineoplastic chemotherapy   -ID ppx: Fluconazole and Acyclovir  -1 unit PRBCs leukoreduced and irradiated for hgb <7  -1 unit plt leukoreduced and irradiated for plt<10     Diarrhea, improving    -2/2 chemotherapy  -Imodium 2mg PO PRN      Type 2 diabetes mellitus without complications  Lumbar stenosis with neurogenic claudication  -Tylenol 650 mg PO Q4H PRN     Benign essential hypertension     -Chlorthalidone 25 mg daily    KPS Score 70%    Plan  - Continue ppx abx  -  [Well Nourished] : well nourished Continue with IV hydration twice weekly with RN home care  - Increase oral intake and physical activity  - RTC on 4/1/20    Time  Total face to face time spent with patient 35 minutes.  Greater than 50% of the total time was spent counseling and/or coordinating care.    Jovita Gamboa, RENAMAHENDRA-BC   [No Acute Distress] : no acute distress [Normal Conjunctiva] : normal conjunctiva [Normal Venous Pressure] : normal venous pressure [No Carotid Bruit] : no carotid bruit [Normal S1, S2] : normal S1, S2 [No Murmur] : no murmur [No Rub] : no rub [No Gallop] : no gallop [Clear Lung Fields] : clear lung fields [Good Air Entry] : good air entry [No Respiratory Distress] : no respiratory distress  [Soft] : abdomen soft [Non Tender] : non-tender [No Masses/organomegaly] : no masses/organomegaly [Normal Bowel Sounds] : normal bowel sounds [Normal Gait] : normal gait [No Edema] : no edema [No Cyanosis] : no cyanosis [No Clubbing] : no clubbing [No Varicosities] : no varicosities [Moves all extremities] : moves all extremities [No Focal Deficits] : no focal deficits [Normal Speech] : normal speech [Alert and Oriented] : alert and oriented [Normal memory] : normal memory

## 2024-05-28 ENCOUNTER — OFFICE VISIT (OUTPATIENT)
Dept: SURGERY | Facility: CLINIC | Age: 79
End: 2024-05-28
Payer: MEDICARE

## 2024-05-28 VITALS — BODY MASS INDEX: 26.15 KG/M2 | HEIGHT: 68 IN | DIASTOLIC BLOOD PRESSURE: 86 MMHG | SYSTOLIC BLOOD PRESSURE: 140 MMHG

## 2024-05-28 DIAGNOSIS — K40.90 LEFT INGUINAL HERNIA: Primary | ICD-10-CM

## 2024-05-28 PROCEDURE — 1159F MED LIST DOCD IN RCRD: CPT | Performed by: SURGERY

## 2024-05-28 PROCEDURE — 99204 OFFICE O/P NEW MOD 45 MIN: CPT | Performed by: SURGERY

## 2024-05-28 PROCEDURE — 1160F RVW MEDS BY RX/DR IN RCRD: CPT | Performed by: SURGERY

## 2024-05-28 PROCEDURE — 3077F SYST BP >= 140 MM HG: CPT | Performed by: SURGERY

## 2024-05-28 PROCEDURE — 3079F DIAST BP 80-89 MM HG: CPT | Performed by: SURGERY

## 2024-05-28 RX ORDER — ASPIRIN 81 MG/1
81 TABLET ORAL DAILY
COMMUNITY

## 2024-05-28 RX ORDER — ALIROCUMAB 75 MG/ML
INJECTION, SOLUTION SUBCUTANEOUS
COMMUNITY

## 2024-05-28 RX ORDER — ZOLPIDEM TARTRATE 5 MG/1
5 TABLET ORAL NIGHTLY
COMMUNITY
Start: 2024-03-12

## 2024-05-28 NOTE — LETTER
May 28, 2024     Claire Suarez MD     Patient: Chuck Zheng   YOB: 1945   Date of Visit: 5/28/2024     Dear Claire Suarez MD:       Thank you for referring Chuck Zheng to me for evaluation. Below are the relevant portions of my assessment and plan of care.    If you have questions, please do not hesitate to call me. I look forward to following Chuck along with you.         Sincerely,        Chong Ahuja Jr., MD        CC:   No Recipients    Chong Ahuja Jr., MD  05/28/24 1233  Sign when Signing Visit  Subjective  Chuck Zheng is a 78 y.o. male who presents to the office in surgical consultation from Claire Suarez MD for a left inguinal hernia.    History of present illness  The patient is a very active male who does a lot of hiking and was recently carrying heavy boxes for his wife and developed acute pain in the left lower abdomen.  The pain persisted to the point that he presented to the emergency room where a CT scan of the abdomen and pelvis showed a fat-containing left inguinal hernia.  He has not had any further pain since that ER visit which was on 5/10/2024.  He has noticed a small bulge in the left groin.  He is able to reduce it.  He has not had any change in his bowel or bladder function.  His only previous abdominal surgery is a laparoscopic cholecystectomy.    He has a history of coronary artery disease and has undergone stenting of the right coronary artery in 2007.  He routinely takes aspirin but no other anticoagulant.  He has no cardiac symptoms.    Review of Systems   Constitutional:  Negative for fatigue and fever.   Respiratory:  Negative for chest tightness and shortness of breath.    Cardiovascular:  Negative for chest pain and palpitations.   Gastrointestinal:  Negative for abdominal pain, blood in stool, constipation, diarrhea, nausea and vomiting.     Past Medical History:   Diagnosis Date   • Adhesive capsulitis of shoulder    •  Arthritis    • Benign non-nodular prostatic hyperplasia 11/21/2016   • Benign prostatic hypertrophy    • Cholelithiasis 1995   • Coronary artery disease    • Functional diarrhea 09/13/2017    Resolved with gluten free diet   • H/O cardiovascular stress test     7/14 normal   • Hemorrhoid    • History of COVID-19 11/2020   • Hyperlipidemia    • Hypertension    • Myocardial infarction      05/2007, distal RCA 80% stenosis, s/p PTCA   • Nephrolithiasis     left kidney   • Obstructive sleep apnea     BIPAP   • Primary insomnia 05/16/2016   • Rupture of flexor tendon of hand    • Syncope     12/2004 neg w/u   • Tear of meniscus of knee 2015     Past Surgical History:   Procedure Laterality Date   • CHOLECYSTECTOMY     • COLONOSCOPY      06/2003, nl, , 02/2014 , nl, needs C-scope in 2024   • CORONARY STENT PLACEMENT      5/2007 distal RCA   • EYE SURGERY      JOEY CATARACTS W IOL   • HAND SURGERY Right     THUMB SX   • KNEE ARTHROSCOPY Right     2015   • KNEE ARTHROSCOPY Left 01/15/2019    Procedure: LEFT KNEE ARTHROSCOPY,  PARTIAL MEDIAL MENISECTOMY;  Surgeon: Mason Ochoa MD;  Location: [facility];  Service: Orthopedics   • TONSILLECTOMY     • TOTAL KNEE ARTHROPLASTY Left 12/17/2021    Procedure: TOTAL KNEE ARTHROPLASTY;  Surgeon: Neal Goodwin MD;  Location: [facility];  Service: Orthopedics;  Laterality: Left;     Family History   Problem Relation Age of Onset   • Dementia Mother    • Glaucoma Mother    • Alcohol abuse Mother    • Heart disease Father    • Colon polyps Father    • Hearing loss Father    • Alcohol abuse Sister    • Early death Sister    • Early death Sister    • Malig Hyperthermia Neg Hx      Social History     Socioeconomic History   • Marital status:    Tobacco Use   • Smoking status: Never   • Smokeless tobacco: Never   Vaping Use   • Vaping status: Never Used   Substance and Sexual Activity   • Alcohol use: Yes     Alcohol/week: 6.0 standard drinks of alcohol      Types: 6 Glasses of wine per week     Comment: 7 drinks weekly   • Drug use: No   • Sexual activity: Not Currently     Partners: Female     Birth control/protection: None       Objective  Physical Exam  Constitutional:       Appearance: Normal appearance. He is well-developed. He is not toxic-appearing.   Eyes:      General: No scleral icterus.  Pulmonary:      Effort: Pulmonary effort is normal. No respiratory distress.   Abdominal:      Palpations: Abdomen is soft.      Tenderness: There is no abdominal tenderness.      Hernia: Hernia is present in the left inguinal area. There is no hernia in the right inguinal area.      Comments: There is a moderately sized left inguinal hernia that is reducible.  It contains fat.   Skin:     General: Skin is warm and dry.   Neurological:      Mental Status: He is alert and oriented to person, place, and time.   Psychiatric:         Behavior: Behavior normal.         Thought Content: Thought content normal.         Judgment: Judgment normal.              Assessment & Plan    1.  Left inguinal hernia: The patient has a left inguinal hernia that is currently minimally symptomatic and contains fat.  The defect is large enough to permit the bowel but it is currently occupied by fat, likely the omentum.  This was discussed at length with the patient.  It was explained to him that the hernia will always be present if surgery is not performed as it does not heal spontaneously.  He is worried about general anesthesia and would like to avoid surgery if possible.  Approaches to inguinal hernia repair were discussed with him and it was explained that he is a very good candidate for a robotic left inguinal hernia repair.  Expectations for surgery and recovery were discussed with him.  Since the defect is large enough to permit bowel, my recommendation for him is to proceed with surgery.  He would like to think about his options and will contact our office if he wants to have  surgery.

## 2024-05-28 NOTE — PROGRESS NOTES
Subjective   Chuck Zheng is a 78 y.o. male who presents to the office in surgical consultation from Claire Suarez MD for a left inguinal hernia.    History of present illness  The patient is a very active male who does a lot of hiking and was recently carrying heavy boxes for his wife and developed acute pain in the left lower abdomen.  The pain persisted to the point that he presented to the emergency room where a CT scan of the abdomen and pelvis showed a fat-containing left inguinal hernia.  He has not had any further pain since that ER visit which was on 5/10/2024.  He has noticed a small bulge in the left groin.  He is able to reduce it.  He has not had any change in his bowel or bladder function.  His only previous abdominal surgery is a laparoscopic cholecystectomy.    He has a history of coronary artery disease and has undergone stenting of the right coronary artery in 2007.  He routinely takes aspirin but no other anticoagulant.  He has no cardiac symptoms.    Review of Systems   Constitutional:  Negative for fatigue and fever.   Respiratory:  Negative for chest tightness and shortness of breath.    Cardiovascular:  Negative for chest pain and palpitations.   Gastrointestinal:  Negative for abdominal pain, blood in stool, constipation, diarrhea, nausea and vomiting.     Past Medical History:   Diagnosis Date    Adhesive capsulitis of shoulder     Arthritis     Benign non-nodular prostatic hyperplasia 11/21/2016    Benign prostatic hypertrophy     Cholelithiasis 1995    Coronary artery disease     Functional diarrhea 09/13/2017    Resolved with gluten free diet    H/O cardiovascular stress test     7/14 normal    Hemorrhoid     History of COVID-19 11/2020    Hyperlipidemia     Hypertension     Myocardial infarction      05/2007, distal RCA 80% stenosis, s/p PTCA    Nephrolithiasis     left kidney    Obstructive sleep apnea     BIPAP    Primary insomnia 05/16/2016    Rupture of flexor tendon of  hand     Syncope     12/2004 neg w/u    Tear of meniscus of knee 2015     Past Surgical History:   Procedure Laterality Date    CHOLECYSTECTOMY      COLONOSCOPY      06/2003, nl, , 02/2014 , nl, needs C-scope in 2024    CORONARY STENT PLACEMENT      5/2007 distal RCA    EYE SURGERY      JOEY CATARACTS W IOL    HAND SURGERY Right     THUMB SX    KNEE ARTHROSCOPY Right     2015    KNEE ARTHROSCOPY Left 01/15/2019    Procedure: LEFT KNEE ARTHROSCOPY,  PARTIAL MEDIAL MENISECTOMY;  Surgeon: Mason Ochoa MD;  [place]  [facility] [place]  Service: [place]opedics    TONSILLECTOMY      TOTAL KNEE ARTHROPLASTY Left 12/17/2021    Procedure: TOTAL KNEE ARTHROPLASTY;  Surgeon: Neal Goodwin MD;  [facility];  Service: Orthopedics;  Laterality: Left;     Family History   Problem Relation Age of Onset    Dementia Mother     Glaucoma Mother     Alcohol abuse Mother     Heart disease Father     Colon polyps Father     Hearing loss Father     Alcohol abuse Sister     Early death Sister     Early death Sister     Malig Hyperthermia Neg Hx      Social History     Socioeconomic History    Marital status:    Tobacco Use    Smoking status: Never    Smokeless tobacco: Never   Vaping Use    Vaping status: Never Used   Substance and Sexual Activity    Alcohol use: Yes     Alcohol/week: 6.0 standard drinks of alcohol     Types: 6 Glasses of wine per week     Comment: 7 drinks weekly    Drug use: No    Sexual activity: Not Currently     Partners: Female     Birth control/protection: None       Objective   Physical Exam  Constitutional:       Appearance: Normal appearance. He is well-developed. He is not toxic-appearing.   Eyes:      General: No scleral icterus.  Pulmonary:      Effort: Pulmonary effort is normal. No respiratory distress.   Abdominal:      Palpations: Abdomen is soft.      Tenderness: There is no abdominal tenderness.      Hernia: Hernia is present in the left inguinal area. There is no  hernia in the right inguinal area.      Comments: There is a moderately sized left inguinal hernia that is reducible.  It contains fat.   Skin:     General: Skin is warm and dry.   Neurological:      Mental Status: He is alert and oriented to person, place, and time.   Psychiatric:         Behavior: Behavior normal.         Thought Content: Thought content normal.         Judgment: Judgment normal.              Assessment & Plan     1.  Left inguinal hernia: The patient has a left inguinal hernia that is currently minimally symptomatic and contains fat.  The defect is large enough to permit the bowel but it is currently occupied by fat, likely the omentum.  This was discussed at length with the patient.  It was explained to him that the hernia will always be present if surgery is not performed as it does not heal spontaneously.  He is worried about general anesthesia and would like to avoid surgery if possible.  Approaches to inguinal hernia repair were discussed with him and it was explained that he is a very good candidate for a robotic left inguinal hernia repair.  Expectations for surgery and recovery were discussed with him.  Since the defect is large enough to permit bowel, my recommendation for him is to proceed with surgery.  He would like to think about his options and will contact our office if he wants to have surgery.

## 2024-05-30 ENCOUNTER — HOSPITAL ENCOUNTER (OUTPATIENT)
Dept: NUCLEAR MEDICINE | Facility: HOSPITAL | Age: 79
Discharge: HOME | End: 2024-05-30
Payer: MEDICARE

## 2024-05-30 ENCOUNTER — HOSPITAL ENCOUNTER (OUTPATIENT)
Dept: CARDIOLOGY | Facility: HOSPITAL | Age: 79
Discharge: HOME | End: 2024-05-30
Admitting: INTERNAL MEDICINE
Payer: MEDICARE

## 2024-05-30 VITALS
SYSTOLIC BLOOD PRESSURE: 112 MMHG | WEIGHT: 171 LBS | BODY MASS INDEX: 25.91 KG/M2 | HEIGHT: 68 IN | DIASTOLIC BLOOD PRESSURE: 87 MMHG

## 2024-05-30 DIAGNOSIS — I25.10 CAD IN NATIVE ARTERY: ICD-10-CM

## 2024-05-30 LAB
AORTIC ARCH: 3.2 CM
AORTIC DIMENSIONLESS INDEX: 0.8 (DI)
ASCENDING AORTA: 3.7 CM
BH CV ECHO MEAS - ACS: 1.78 CM
BH CV ECHO MEAS - AI P1/2T: 423.7 MSEC
BH CV ECHO MEAS - AO MAX PG: 5.7 MMHG
BH CV ECHO MEAS - AO MEAN PG: 3 MMHG
BH CV ECHO MEAS - AO ROOT DIAM: 3.4 CM
BH CV ECHO MEAS - AO V2 MAX: 119 CM/SEC
BH CV ECHO MEAS - AO V2 VTI: 24.7 CM
BH CV ECHO MEAS - AVA(I,D): 2.6 CM2
BH CV ECHO MEAS - EDV(CUBED): 97.3 ML
BH CV ECHO MEAS - EDV(MOD-SP2): 95 ML
BH CV ECHO MEAS - EDV(MOD-SP4): 92 ML
BH CV ECHO MEAS - EF(MOD-BP): 56.5 %
BH CV ECHO MEAS - EF(MOD-SP2): 56.8 %
BH CV ECHO MEAS - EF(MOD-SP4): 57.6 %
BH CV ECHO MEAS - ESV(CUBED): 28.7 ML
BH CV ECHO MEAS - ESV(MOD-SP2): 41 ML
BH CV ECHO MEAS - ESV(MOD-SP4): 39 ML
BH CV ECHO MEAS - FS: 33.4 %
BH CV ECHO MEAS - IVS/LVPW: 1.1 CM
BH CV ECHO MEAS - IVSD: 1.1 CM
BH CV ECHO MEAS - LAT PEAK E' VEL: 11.3 CM/SEC
BH CV ECHO MEAS - LV DIASTOLIC VOL/BSA (35-75): 48.1 CM2
BH CV ECHO MEAS - LV MASS(C)D: 169.9 GRAMS
BH CV ECHO MEAS - LV MAX PG: 3.5 MMHG
BH CV ECHO MEAS - LV MEAN PG: 2 MMHG
BH CV ECHO MEAS - LV SYSTOLIC VOL/BSA (12-30): 20.4 CM2
BH CV ECHO MEAS - LV V1 MAX: 93.6 CM/SEC
BH CV ECHO MEAS - LV V1 VTI: 20 CM
BH CV ECHO MEAS - LVIDD: 4.6 CM
BH CV ECHO MEAS - LVIDS: 3.1 CM
BH CV ECHO MEAS - LVOT AREA: 3.2 CM2
BH CV ECHO MEAS - LVOT DIAM: 2.02 CM
BH CV ECHO MEAS - LVPWD: 1 CM
BH CV ECHO MEAS - MED PEAK E' VEL: 7.3 CM/SEC
BH CV ECHO MEAS - MR MAX PG: 53.5 MMHG
BH CV ECHO MEAS - MR MAX VEL: 365.8 CM/SEC
BH CV ECHO MEAS - MV A DUR: 0.11 SEC
BH CV ECHO MEAS - MV A MAX VEL: 63.8 CM/SEC
BH CV ECHO MEAS - MV DEC SLOPE: 400.5 CM/SEC2
BH CV ECHO MEAS - MV DEC TIME: 0.21 SEC
BH CV ECHO MEAS - MV E MAX VEL: 77.1 CM/SEC
BH CV ECHO MEAS - MV E/A: 1.21
BH CV ECHO MEAS - MV MAX PG: 2.7 MMHG
BH CV ECHO MEAS - MV MEAN PG: 1.42 MMHG
BH CV ECHO MEAS - MV P1/2T: 59 MSEC
BH CV ECHO MEAS - MV V2 VTI: 21.5 CM
BH CV ECHO MEAS - MVA(P1/2T): 3.7 CM2
BH CV ECHO MEAS - MVA(VTI): 3 CM2
BH CV ECHO MEAS - PA ACC TIME: 0.13 SEC
BH CV ECHO MEAS - PA V2 MAX: 77.6 CM/SEC
BH CV ECHO MEAS - PULM A REVS DUR: 0.12 SEC
BH CV ECHO MEAS - PULM A REVS VEL: 36.7 CM/SEC
BH CV ECHO MEAS - PULM DIAS VEL: 42.8 CM/SEC
BH CV ECHO MEAS - PULM S/D: 1.42
BH CV ECHO MEAS - PULM SYS VEL: 60.9 CM/SEC
BH CV ECHO MEAS - QP/QS: 0.58
BH CV ECHO MEAS - RAP SYSTOLE: 3 MMHG
BH CV ECHO MEAS - RV MAX PG: 1.2 MMHG
BH CV ECHO MEAS - RV V1 MAX: 54.8 CM/SEC
BH CV ECHO MEAS - RV V1 VTI: 12.5 CM
BH CV ECHO MEAS - RVOT DIAM: 1.95 CM
BH CV ECHO MEAS - RVSP: 26 MMHG
BH CV ECHO MEAS - SUP REN AO DIAM: 2.2 CM
BH CV ECHO MEAS - SV(LVOT): 64.1 ML
BH CV ECHO MEAS - SV(MOD-SP2): 54 ML
BH CV ECHO MEAS - SV(MOD-SP4): 53 ML
BH CV ECHO MEAS - SV(RVOT): 37.3 ML
BH CV ECHO MEAS - SVI(LVOT): 33.5 ML/M2
BH CV ECHO MEAS - SVI(MOD-SP2): 28.2 ML/M2
BH CV ECHO MEAS - SVI(MOD-SP4): 27.7 ML/M2
BH CV ECHO MEAS - TAPSE (>1.6): 2.07 CM
BH CV ECHO MEAS - TR MAX PG: 23 MMHG
BH CV ECHO MEAS - TR MAX VEL: 240 CM/SEC
BH CV ECHO MEASUREMENTS AVERAGE E/E' RATIO: 8.29
BH CV REST NUCLEAR ISOTOPE DOSE: 12 MCI
BH CV STRESS BP STAGE 1: NORMAL
BH CV STRESS BP STAGE 2: NORMAL
BH CV STRESS BP STAGE 3: NORMAL
BH CV STRESS DURATION MIN STAGE 1: 3
BH CV STRESS DURATION MIN STAGE 2: 3
BH CV STRESS DURATION MIN STAGE 3: 0
BH CV STRESS DURATION SEC STAGE 1: 0
BH CV STRESS DURATION SEC STAGE 2: 0
BH CV STRESS DURATION SEC STAGE 3: 30
BH CV STRESS GRADE STAGE 1: 10
BH CV STRESS GRADE STAGE 2: 12
BH CV STRESS GRADE STAGE 3: 14
BH CV STRESS HR STAGE 1: 110
BH CV STRESS HR STAGE 2: 118
BH CV STRESS HR STAGE 3: 125
BH CV STRESS METS STAGE 1: 5
BH CV STRESS METS STAGE 2: 7.5
BH CV STRESS METS STAGE 3: 10
BH CV STRESS NUCLEAR ISOTOPE DOSE: 33 MCI
BH CV STRESS PROTOCOL 1: NORMAL
BH CV STRESS RECOVERY BP: NORMAL MMHG
BH CV STRESS RECOVERY HR: 79 BPM
BH CV STRESS SPEED STAGE 1: 1.7
BH CV STRESS SPEED STAGE 2: 2.5
BH CV STRESS SPEED STAGE 3: 3.4
BH CV STRESS STAGE 1: 1
BH CV STRESS STAGE 2: 2
BH CV STRESS STAGE 3: 3
BH CV XLRA - RV BASE: 2.7 CM
BH CV XLRA - RV LENGTH: 8.2 CM
BH CV XLRA - RV MID: 2.7 CM
BH CV XLRA - TDI S': 12.2 CM/SEC
LEFT ATRIUM VOLUME INDEX: 31.6 ML/M2
LV EF NUC BP: 60 %
MAXIMAL PREDICTED HEART RATE: 143 BPM
PERCENT MAX PREDICTED HR: 88.11 %
SINUS: 3.3 CM
STJ: 2.5 CM
STRESS BASELINE BP: NORMAL MMHG
STRESS BASELINE HR: 74 BPM
STRESS PERCENT HR: 104 %
STRESS POST ESTIMATED WORKLOAD: 7.8 METS
STRESS POST EXERCISE DUR MIN: 6 MIN
STRESS POST EXERCISE DUR SEC: 30 SEC
STRESS POST PEAK BP: NORMAL MMHG
STRESS POST PEAK HR: 126 BPM
STRESS TARGET HR: 122 BPM

## 2024-05-30 PROCEDURE — 0 TECHNETIUM SESTAMIBI: Performed by: INTERNAL MEDICINE

## 2024-05-30 PROCEDURE — A9500 TC99M SESTAMIBI: HCPCS | Performed by: INTERNAL MEDICINE

## 2024-05-30 PROCEDURE — 93017 CV STRESS TEST TRACING ONLY: CPT

## 2024-05-30 PROCEDURE — 78452 HT MUSCLE IMAGE SPECT MULT: CPT

## 2024-05-30 PROCEDURE — 93306 TTE W/DOPPLER COMPLETE: CPT

## 2024-05-30 RX ADMIN — TECHNETIUM TC 99M SESTAMIBI 1 DOSE: 1 INJECTION INTRAVENOUS at 09:20

## 2024-05-30 RX ADMIN — TECHNETIUM TC 99M SESTAMIBI 1 DOSE: 1 INJECTION INTRAVENOUS at 08:10

## 2024-05-31 ENCOUNTER — TELEPHONE (OUTPATIENT)
Dept: SURGERY | Facility: CLINIC | Age: 79
End: 2024-05-31
Payer: MEDICARE

## 2024-05-31 DIAGNOSIS — K40.90 LEFT INGUINAL HERNIA: Primary | ICD-10-CM

## 2024-05-31 NOTE — TELEPHONE ENCOUNTER
Patient calling to schedule Left Inguinal Hernia Repair. Patient requested surgery date of 8/5. Please place orders.

## 2024-08-30 ENCOUNTER — PRE-ADMISSION TESTING (OUTPATIENT)
Dept: PREADMISSION TESTING | Facility: HOSPITAL | Age: 79
End: 2024-08-30
Payer: MEDICARE

## 2024-08-30 VITALS
SYSTOLIC BLOOD PRESSURE: 153 MMHG | TEMPERATURE: 97.6 F | BODY MASS INDEX: 26.21 KG/M2 | RESPIRATION RATE: 18 BRPM | WEIGHT: 167 LBS | OXYGEN SATURATION: 100 % | DIASTOLIC BLOOD PRESSURE: 70 MMHG | HEIGHT: 67 IN | HEART RATE: 82 BPM

## 2024-08-30 LAB
ANION GAP SERPL CALCULATED.3IONS-SCNC: 6 MMOL/L (ref 5–15)
BUN SERPL-MCNC: 12 MG/DL (ref 8–23)
BUN/CREAT SERPL: 13.6 (ref 7–25)
CALCIUM SPEC-SCNC: 9.3 MG/DL (ref 8.6–10.5)
CHLORIDE SERPL-SCNC: 108 MMOL/L (ref 98–107)
CO2 SERPL-SCNC: 29 MMOL/L (ref 22–29)
CREAT SERPL-MCNC: 0.88 MG/DL (ref 0.76–1.27)
DEPRECATED RDW RBC AUTO: 43.5 FL (ref 37–54)
EGFRCR SERPLBLD CKD-EPI 2021: 88.6 ML/MIN/1.73
ERYTHROCYTE [DISTWIDTH] IN BLOOD BY AUTOMATED COUNT: 13.5 % (ref 12.3–15.4)
GLUCOSE SERPL-MCNC: 103 MG/DL (ref 65–99)
HCT VFR BLD AUTO: 40.3 % (ref 37.5–51)
HGB BLD-MCNC: 13 G/DL (ref 13–17.7)
MCH RBC QN AUTO: 28.3 PG (ref 26.6–33)
MCHC RBC AUTO-ENTMCNC: 32.3 G/DL (ref 31.5–35.7)
MCV RBC AUTO: 87.8 FL (ref 79–97)
PLATELET # BLD AUTO: 235 10*3/MM3 (ref 140–450)
PMV BLD AUTO: 9.7 FL (ref 6–12)
POTASSIUM SERPL-SCNC: 4.7 MMOL/L (ref 3.5–5.2)
RBC # BLD AUTO: 4.59 10*6/MM3 (ref 4.14–5.8)
SODIUM SERPL-SCNC: 143 MMOL/L (ref 136–145)
WBC NRBC COR # BLD AUTO: 5.15 10*3/MM3 (ref 3.4–10.8)

## 2024-08-30 PROCEDURE — 80048 BASIC METABOLIC PNL TOTAL CA: CPT

## 2024-08-30 PROCEDURE — 36415 COLL VENOUS BLD VENIPUNCTURE: CPT

## 2024-08-30 PROCEDURE — 85027 COMPLETE CBC AUTOMATED: CPT

## 2024-08-30 RX ORDER — AMLODIPINE BESYLATE 5 MG/1
5 TABLET ORAL NIGHTLY
COMMUNITY

## 2024-08-30 RX ORDER — CHOLECALCIFEROL (VITAMIN D3) 25 MCG
1000 TABLET ORAL DAILY
COMMUNITY

## 2024-08-30 RX ORDER — CHLORAL HYDRATE 500 MG
1000 CAPSULE ORAL
COMMUNITY

## 2024-08-30 NOTE — DISCHARGE INSTRUCTIONS
Take the following medications the morning of surgery:    NONE      If you are on prescription narcotic pain medication to control your pain you may also take that medication the morning of surgery.      General Instructions:     Do not eat solid food after midnight the night before surgery.  Clear liquids day of surgery are allowed but must be stopped at least two hours before your hospital arrival time.       Allowed clear liquids      Water, sodas, and tea or coffee with no cream or milk added.       12 to 20 ounces of a clear liquid that contains carbohydrates is recommended.  If non-diabetic, have Gatorade or Powerade.  If diabetic, have G2 or Powerade Zero.     Do not have liquids red in color.  Do not consume chicken, beef, pork or vegetable broth or bouillon cubes of any variety as they are not considered clear liquids and are not allowed.      Infants may have breast milk up to four hours before surgery.  Infants drinking formula may drink formula up to six hours before surgery.   Patients who avoid smoking, chewing tobacco and alcohol for 4 weeks prior to surgery have a reduced risk of post-operative complications.  Quit smoking as many days before surgery as you can.  Do not smoke, use chewing tobacco or drink alcohol the day of surgery.   If applicable bring your C-PAP/ BI-PAP machine in with you to preop day of surgery.  Bring any papers given to you in the doctor’s office.  Wear clean comfortable clothes.  Do not wear contact lenses, false eyelashes or make-up.  Bring a case for your glasses.   Bring crutches or walker if applicable.  Remove all piercings.  Leave jewelry and any other valuables at home.  Hair extensions with metal clips must be removed prior to surgery.  The Pre-Admission Testing nurse will instruct you to bring medications if unable to obtain an accurate list in Pre-Admission Testing.        If you were given a blood bank ID arm band remember to bring it with you the day of  surgery.    Preventing a Surgical Site Infection:  For 2 to 3 days before surgery, avoid shaving with a razor because the razor can irritate skin and make it easier to develop an infection.    Any areas of open skin can increase the risk of a post-operative wound infection by allowing bacteria to enter and travel throughout the body.  Notify your surgeon if you have any skin wounds / rashes even if it is not near the expected surgical site.  The area will need assessed to determine if surgery should be delayed until it is healed.  The night prior to surgery shower using a fresh bar of anti-bacterial soap (such as Dial) and clean washcloth.  Sleep in a clean bed with clean clothing.  Do not allow pets to sleep with you.  Shower on the morning of surgery using a fresh bar of anti-bacterial soap (such as Dial) and clean washcloth.  Dry with a clean towel and dress in clean clothing.  Ask your surgeon if you will be receiving antibiotics prior to surgery.  Make sure you, your family, and all healthcare providers clean their hands with soap and water or an alcohol based hand  before caring for you or your wound.    Day of surgery:  Your arrival time is approximately two hours before your scheduled surgery time.  Please note if you have an early arrival time the surgery doors do not open before 5:00 AM.  Upon arrival, a Pre-op nurse and Anesthesiologist will review your health history, obtain vital signs, and answer questions you may have.  The only belongings needed at this time will be a list of your home medications and if applicable your C-PAP/BI-PAP machine.  A Pre-op nurse will start an IV and you may receive medication in preparation for surgery, including something to help you relax.     Please be aware that surgery does come with discomfort.  We want to make every effort to control your discomfort so please discuss any uncontrolled symptoms with your nurse.   Your doctor will most likely have prescribed  pain medications.      If you are going home after surgery you will receive individualized written care instructions before being discharged.  A responsible adult must drive you to and from the hospital on the day of your surgery and ideally stay with you through the night.   .  Discharge prescriptions can be filled by the hospital pharmacy during regular pharmacy hours.  If you are having surgery late in the day/evening your prescription may be e-prescribed to your pharmacy.  Please verify your pharmacy hours or chose a 24 hour pharmacy to avoid not having access to your prescription because your pharmacy has closed for the day.    If you are staying overnight following surgery, you will be transported to your hospital room following the recovery period.  [facility] has all private rooms.    If you have any questions please call Pre-Admission Testing at [phone].  Deductibles and co-payments are collected on the day of service. Please be prepared to pay the required co-pay, deductible or deposit on the day of service as defined by your plan.    Call your surgeon immediately if you experience any of the following symptoms:  Sore Throat  Shortness of Breath or difficulty breathing  Cough  Chills  Body soreness or muscle pain  Headache  Fever  New loss of taste or smell  Do not arrive for your surgery ill.  Your procedure will need to be rescheduled to another time.  You will need to call your physician before the day of surgery to avoid any unnecessary exposure to hospital staff as well as other patients.  CHLORHEXIDINE CLOTH INSTRUCTIONS  The morning of surgery follow these instructions using the Chlorhexidine cloths you've been given.  These steps reduce bacteria on the body.  Do not use the cloths near your eyes, ears mouth, genitalia or on open wounds.  Throw the cloths away after use but do not try to flush them down a toilet.      Open and remove one cloth at a time from the package.     Leave the cloth unfolded and begin the bathing.  Massage the skin with the cloths using gentle pressure to remove bacteria.  Do not scrub harshly.   Follow the steps below with one 2% CHG cloth per area (6 total cloths).  One cloth for neck, shoulders and chest.  One cloth for both arms, hands, fingers and underarms (do underarms last).  One cloth for the abdomen followed by groin.  One cloth for right leg and foot including between the toes.  One cloth for left leg and foot including between the toes.  The last cloth is to be used for the back of the neck, back and buttocks.    Allow the CHG to air dry 3 minutes on the skin which will give it time to work and decrease the chance of irritation.  The skin may feel sticky until it is dry.  Do not rinse with water or any other liquid or you will lose the beneficial effects of the CHG.  If mild skin irritation occurs, do rinse the skin to remove the CHG.  Report this to the nurse at time of admission.  Do not apply lotions, creams, ointments, deodorants or perfumes after using the clothes. Dress in clean clothes before coming to the hospital.

## 2024-09-09 ENCOUNTER — ANESTHESIA (OUTPATIENT)
Dept: PERIOP | Facility: HOSPITAL | Age: 79
End: 2024-09-09
Payer: MEDICARE

## 2024-09-09 ENCOUNTER — ANESTHESIA EVENT (OUTPATIENT)
Dept: PERIOP | Facility: HOSPITAL | Age: 79
End: 2024-09-09
Payer: MEDICARE

## 2024-09-09 ENCOUNTER — HOSPITAL ENCOUNTER (OUTPATIENT)
Facility: HOSPITAL | Age: 79
Setting detail: HOSPITAL OUTPATIENT SURGERY
Discharge: HOME | End: 2024-09-09
Attending: SURGERY | Admitting: SURGERY
Payer: MEDICARE

## 2024-09-09 VITALS
RESPIRATION RATE: 16 BRPM | OXYGEN SATURATION: 96 % | HEART RATE: 85 BPM | SYSTOLIC BLOOD PRESSURE: 114 MMHG | DIASTOLIC BLOOD PRESSURE: 68 MMHG | TEMPERATURE: 97.5 F

## 2024-09-09 DIAGNOSIS — K40.90 LEFT INGUINAL HERNIA: ICD-10-CM

## 2024-09-09 PROCEDURE — 25810000003 LACTATED RINGERS PER 1000 ML: Performed by: SURGERY

## 2024-09-09 PROCEDURE — 25010000002 MAGNESIUM SULFATE PER 500 MG OF MAGNESIUM: Performed by: NURSE ANESTHETIST, CERTIFIED REGISTERED

## 2024-09-09 PROCEDURE — 25010000002 GLYCOPYRROLATE 1 MG/5ML SOLUTION: Performed by: NURSE ANESTHETIST, CERTIFIED REGISTERED

## 2024-09-09 PROCEDURE — 25810000003 LACTATED RINGERS PER 1000 ML: Performed by: NURSE ANESTHETIST, CERTIFIED REGISTERED

## 2024-09-09 PROCEDURE — 25010000002 ONDANSETRON PER 1 MG: Performed by: NURSE ANESTHETIST, CERTIFIED REGISTERED

## 2024-09-09 PROCEDURE — S0260 H&P FOR SURGERY: HCPCS | Performed by: SURGERY

## 2024-09-09 PROCEDURE — 25010000002 DEXAMETHASONE SODIUM PHOSPHATE 20 MG/5ML SOLUTION: Performed by: NURSE ANESTHETIST, CERTIFIED REGISTERED

## 2024-09-09 PROCEDURE — 25010000002 PROPOFOL 10 MG/ML EMULSION: Performed by: NURSE ANESTHETIST, CERTIFIED REGISTERED

## 2024-09-09 PROCEDURE — C1781 MESH (IMPLANTABLE): HCPCS | Performed by: SURGERY

## 2024-09-09 PROCEDURE — 25010000002 FENTANYL CITRATE (PF) 50 MCG/ML SOLUTION: Performed by: NURSE ANESTHETIST, CERTIFIED REGISTERED

## 2024-09-09 PROCEDURE — 49650 LAP ING HERNIA REPAIR INIT: CPT | Performed by: SURGERY

## 2024-09-09 PROCEDURE — 25810000003 LACTATED RINGERS PER 1000 ML: Performed by: ANESTHESIOLOGY

## 2024-09-09 PROCEDURE — 25010000002 KETOROLAC TROMETHAMINE PER 15 MG: Performed by: NURSE ANESTHETIST, CERTIFIED REGISTERED

## 2024-09-09 PROCEDURE — S2900 ROBOTIC SURGICAL SYSTEM: HCPCS | Performed by: SURGERY

## 2024-09-09 PROCEDURE — 25010000002 CLINDAMYCIN 900 MG/50ML SOLUTION: Performed by: SURGERY

## 2024-09-09 PROCEDURE — 25010000002 SUGAMMADEX 200 MG/2ML SOLUTION: Performed by: NURSE ANESTHETIST, CERTIFIED REGISTERED

## 2024-09-09 DEVICE — KNOTLESS TISSUE CONTROL DEVICE, UNDYED UNIDIRECTIONAL (ANTIBACTERIAL) SYNTHETIC ABSORBABLE DEVICE
Type: IMPLANTABLE DEVICE | Site: ABDOMEN | Status: FUNCTIONAL
Brand: STRATAFIX

## 2024-09-09 DEVICE — 3DMAX™ MID ANATOMICAL MESH, LARGE, LEFT, 4" X 6", 10 X 16 CM
Type: IMPLANTABLE DEVICE | Site: ABDOMEN | Status: FUNCTIONAL
Brand: 3DMAX™ MID ANATOMICAL MESH

## 2024-09-09 RX ORDER — DIPHENHYDRAMINE HYDROCHLORIDE 50 MG/ML
12.5 INJECTION INTRAMUSCULAR; INTRAVENOUS
Status: DISCONTINUED | OUTPATIENT
Start: 2024-09-09 | End: 2024-09-09 | Stop reason: HOSPADM

## 2024-09-09 RX ORDER — MAGNESIUM SULFATE HEPTAHYDRATE 500 MG/ML
INJECTION, SOLUTION INTRAMUSCULAR; INTRAVENOUS AS NEEDED
Status: DISCONTINUED | OUTPATIENT
Start: 2024-09-09 | End: 2024-09-09 | Stop reason: SURG

## 2024-09-09 RX ORDER — IPRATROPIUM BROMIDE AND ALBUTEROL SULFATE 2.5; .5 MG/3ML; MG/3ML
3 SOLUTION RESPIRATORY (INHALATION) ONCE AS NEEDED
Status: DISCONTINUED | OUTPATIENT
Start: 2024-09-09 | End: 2024-09-09 | Stop reason: HOSPADM

## 2024-09-09 RX ORDER — EPHEDRINE SULFATE 50 MG/ML
INJECTION INTRAVENOUS AS NEEDED
Status: DISCONTINUED | OUTPATIENT
Start: 2024-09-09 | End: 2024-09-09 | Stop reason: SURG

## 2024-09-09 RX ORDER — DEXAMETHASONE SODIUM PHOSPHATE 4 MG/ML
INJECTION, SOLUTION INTRA-ARTICULAR; INTRALESIONAL; INTRAMUSCULAR; INTRAVENOUS; SOFT TISSUE AS NEEDED
Status: DISCONTINUED | OUTPATIENT
Start: 2024-09-09 | End: 2024-09-09 | Stop reason: SURG

## 2024-09-09 RX ORDER — HYDROMORPHONE HYDROCHLORIDE 1 MG/ML
0.25 INJECTION, SOLUTION INTRAMUSCULAR; INTRAVENOUS; SUBCUTANEOUS
Status: DISCONTINUED | OUTPATIENT
Start: 2024-09-09 | End: 2024-09-09 | Stop reason: HOSPADM

## 2024-09-09 RX ORDER — HYDROCODONE BITARTRATE AND ACETAMINOPHEN 7.5; 325 MG/1; MG/1
1 TABLET ORAL EVERY 4 HOURS PRN
Status: DISCONTINUED | OUTPATIENT
Start: 2024-09-09 | End: 2024-09-09 | Stop reason: HOSPADM

## 2024-09-09 RX ORDER — FLUMAZENIL 0.1 MG/ML
0.2 INJECTION INTRAVENOUS AS NEEDED
Status: DISCONTINUED | OUTPATIENT
Start: 2024-09-09 | End: 2024-09-09 | Stop reason: HOSPADM

## 2024-09-09 RX ORDER — ONDANSETRON 2 MG/ML
4 INJECTION INTRAMUSCULAR; INTRAVENOUS ONCE AS NEEDED
Status: COMPLETED | OUTPATIENT
Start: 2024-09-09 | End: 2024-09-09

## 2024-09-09 RX ORDER — SODIUM CHLORIDE, SODIUM LACTATE, POTASSIUM CHLORIDE, CALCIUM CHLORIDE 600; 310; 30; 20 MG/100ML; MG/100ML; MG/100ML; MG/100ML
INJECTION, SOLUTION INTRAVENOUS CONTINUOUS PRN
Status: DISCONTINUED | OUTPATIENT
Start: 2024-09-09 | End: 2024-09-09 | Stop reason: SURG

## 2024-09-09 RX ORDER — LABETALOL HYDROCHLORIDE 5 MG/ML
5 INJECTION, SOLUTION INTRAVENOUS
Status: DISCONTINUED | OUTPATIENT
Start: 2024-09-09 | End: 2024-09-09 | Stop reason: HOSPADM

## 2024-09-09 RX ORDER — KETOROLAC TROMETHAMINE 30 MG/ML
INJECTION, SOLUTION INTRAMUSCULAR; INTRAVENOUS AS NEEDED
Status: DISCONTINUED | OUTPATIENT
Start: 2024-09-09 | End: 2024-09-09 | Stop reason: SURG

## 2024-09-09 RX ORDER — SODIUM CHLORIDE, SODIUM LACTATE, POTASSIUM CHLORIDE, CALCIUM CHLORIDE 600; 310; 30; 20 MG/100ML; MG/100ML; MG/100ML; MG/100ML
9 INJECTION, SOLUTION INTRAVENOUS CONTINUOUS
Status: DISCONTINUED | OUTPATIENT
Start: 2024-09-09 | End: 2024-09-09 | Stop reason: HOSPADM

## 2024-09-09 RX ORDER — BUPIVACAINE HYDROCHLORIDE AND EPINEPHRINE 5; 5 MG/ML; UG/ML
INJECTION, SOLUTION EPIDURAL; INTRACAUDAL; PERINEURAL AS NEEDED
Status: DISCONTINUED | OUTPATIENT
Start: 2024-09-09 | End: 2024-09-09 | Stop reason: HOSPADM

## 2024-09-09 RX ORDER — PROMETHAZINE HYDROCHLORIDE 25 MG/1
25 TABLET ORAL ONCE AS NEEDED
Status: DISCONTINUED | OUTPATIENT
Start: 2024-09-09 | End: 2024-09-09 | Stop reason: HOSPADM

## 2024-09-09 RX ORDER — ONDANSETRON 2 MG/ML
INJECTION INTRAMUSCULAR; INTRAVENOUS AS NEEDED
Status: DISCONTINUED | OUTPATIENT
Start: 2024-09-09 | End: 2024-09-09 | Stop reason: SURG

## 2024-09-09 RX ORDER — OXYCODONE AND ACETAMINOPHEN 5; 325 MG/1; MG/1
1 TABLET ORAL EVERY 6 HOURS PRN
Qty: 10 TABLET | Refills: 0 | Status: SHIPPED | OUTPATIENT
Start: 2024-09-09

## 2024-09-09 RX ORDER — SODIUM CHLORIDE 0.9 % (FLUSH) 0.9 %
3-10 SYRINGE (ML) INJECTION AS NEEDED
Status: DISCONTINUED | OUTPATIENT
Start: 2024-09-09 | End: 2024-09-09 | Stop reason: HOSPADM

## 2024-09-09 RX ORDER — LIDOCAINE HYDROCHLORIDE 20 MG/ML
INJECTION, SOLUTION INFILTRATION; PERINEURAL AS NEEDED
Status: DISCONTINUED | OUTPATIENT
Start: 2024-09-09 | End: 2024-09-09 | Stop reason: SURG

## 2024-09-09 RX ORDER — HYDRALAZINE HYDROCHLORIDE 20 MG/ML
5 INJECTION INTRAMUSCULAR; INTRAVENOUS
Status: DISCONTINUED | OUTPATIENT
Start: 2024-09-09 | End: 2024-09-09 | Stop reason: HOSPADM

## 2024-09-09 RX ORDER — NALOXONE HCL 0.4 MG/ML
0.2 VIAL (ML) INJECTION AS NEEDED
Status: DISCONTINUED | OUTPATIENT
Start: 2024-09-09 | End: 2024-09-09 | Stop reason: HOSPADM

## 2024-09-09 RX ORDER — PROPOFOL 10 MG/ML
VIAL (ML) INTRAVENOUS AS NEEDED
Status: DISCONTINUED | OUTPATIENT
Start: 2024-09-09 | End: 2024-09-09 | Stop reason: SURG

## 2024-09-09 RX ORDER — CLINDAMYCIN PHOSPHATE 900 MG/50ML
900 INJECTION, SOLUTION INTRAVENOUS ONCE
Status: COMPLETED | OUTPATIENT
Start: 2024-09-09 | End: 2024-09-09

## 2024-09-09 RX ORDER — HYDROCODONE BITARTRATE AND ACETAMINOPHEN 5; 325 MG/1; MG/1
1 TABLET ORAL ONCE AS NEEDED
Status: DISCONTINUED | OUTPATIENT
Start: 2024-09-09 | End: 2024-09-09 | Stop reason: HOSPADM

## 2024-09-09 RX ORDER — EPHEDRINE SULFATE 50 MG/ML
5 INJECTION, SOLUTION INTRAVENOUS ONCE AS NEEDED
Status: DISCONTINUED | OUTPATIENT
Start: 2024-09-09 | End: 2024-09-09 | Stop reason: HOSPADM

## 2024-09-09 RX ORDER — SODIUM CHLORIDE 0.9 % (FLUSH) 0.9 %
3 SYRINGE (ML) INJECTION EVERY 12 HOURS SCHEDULED
Status: DISCONTINUED | OUTPATIENT
Start: 2024-09-09 | End: 2024-09-09 | Stop reason: HOSPADM

## 2024-09-09 RX ORDER — FENTANYL CITRATE 50 UG/ML
50 INJECTION, SOLUTION INTRAMUSCULAR; INTRAVENOUS ONCE AS NEEDED
Status: DISCONTINUED | OUTPATIENT
Start: 2024-09-09 | End: 2024-09-09 | Stop reason: HOSPADM

## 2024-09-09 RX ORDER — GLYCOPYRROLATE 0.2 MG/ML
INJECTION INTRAMUSCULAR; INTRAVENOUS AS NEEDED
Status: DISCONTINUED | OUTPATIENT
Start: 2024-09-09 | End: 2024-09-09 | Stop reason: SURG

## 2024-09-09 RX ORDER — DROPERIDOL 2.5 MG/ML
0.62 INJECTION, SOLUTION INTRAMUSCULAR; INTRAVENOUS
Status: DISCONTINUED | OUTPATIENT
Start: 2024-09-09 | End: 2024-09-09 | Stop reason: HOSPADM

## 2024-09-09 RX ORDER — FAMOTIDINE 10 MG/ML
20 INJECTION, SOLUTION INTRAVENOUS ONCE
Status: COMPLETED | OUTPATIENT
Start: 2024-09-09 | End: 2024-09-09

## 2024-09-09 RX ORDER — MIDAZOLAM HYDROCHLORIDE 1 MG/ML
0.5 INJECTION INTRAMUSCULAR; INTRAVENOUS
Status: DISCONTINUED | OUTPATIENT
Start: 2024-09-09 | End: 2024-09-09 | Stop reason: HOSPADM

## 2024-09-09 RX ORDER — PROMETHAZINE HYDROCHLORIDE 25 MG/1
25 SUPPOSITORY RECTAL ONCE AS NEEDED
Status: DISCONTINUED | OUTPATIENT
Start: 2024-09-09 | End: 2024-09-09 | Stop reason: HOSPADM

## 2024-09-09 RX ORDER — ROCURONIUM BROMIDE 10 MG/ML
INJECTION, SOLUTION INTRAVENOUS AS NEEDED
Status: DISCONTINUED | OUTPATIENT
Start: 2024-09-09 | End: 2024-09-09 | Stop reason: SURG

## 2024-09-09 RX ORDER — FENTANYL CITRATE 50 UG/ML
INJECTION, SOLUTION INTRAMUSCULAR; INTRAVENOUS AS NEEDED
Status: DISCONTINUED | OUTPATIENT
Start: 2024-09-09 | End: 2024-09-09 | Stop reason: SURG

## 2024-09-09 RX ORDER — LIDOCAINE HYDROCHLORIDE 10 MG/ML
0.5 INJECTION, SOLUTION INFILTRATION; PERINEURAL ONCE AS NEEDED
Status: DISCONTINUED | OUTPATIENT
Start: 2024-09-09 | End: 2024-09-09 | Stop reason: HOSPADM

## 2024-09-09 RX ORDER — FENTANYL CITRATE 50 UG/ML
25 INJECTION, SOLUTION INTRAMUSCULAR; INTRAVENOUS
Status: DISCONTINUED | OUTPATIENT
Start: 2024-09-09 | End: 2024-09-09 | Stop reason: HOSPADM

## 2024-09-09 RX ADMIN — SODIUM CHLORIDE, POTASSIUM CHLORIDE, SODIUM LACTATE AND CALCIUM CHLORIDE 9 ML/HR: 600; 310; 30; 20 INJECTION, SOLUTION INTRAVENOUS at 06:43

## 2024-09-09 RX ADMIN — MAGNESIUM SULFATE HEPTAHYDRATE 1 G: 500 INJECTION, SOLUTION INTRAMUSCULAR; INTRAVENOUS at 07:43

## 2024-09-09 RX ADMIN — CLINDAMYCIN PHOSPHATE 900 MG: 900 INJECTION, SOLUTION INTRAVENOUS at 07:18

## 2024-09-09 RX ADMIN — EPHEDRINE SULFATE 10 MG: 50 INJECTION INTRAVENOUS at 08:00

## 2024-09-09 RX ADMIN — SUGAMMADEX 200 MG: 100 INJECTION, SOLUTION INTRAVENOUS at 08:44

## 2024-09-09 RX ADMIN — PROPOFOL 150 MG: 10 INJECTION, EMULSION INTRAVENOUS at 07:31

## 2024-09-09 RX ADMIN — FENTANYL CITRATE 25 MCG: 50 INJECTION, SOLUTION INTRAMUSCULAR; INTRAVENOUS at 07:31

## 2024-09-09 RX ADMIN — KETOROLAC TROMETHAMINE 15 MG: 30 INJECTION, SOLUTION INTRAMUSCULAR at 08:44

## 2024-09-09 RX ADMIN — FENTANYL CITRATE 50 MCG: 50 INJECTION, SOLUTION INTRAMUSCULAR; INTRAVENOUS at 08:53

## 2024-09-09 RX ADMIN — GLYCOPYRROLATE 0.1 MG: 0.2 INJECTION INTRAMUSCULAR; INTRAVENOUS at 07:39

## 2024-09-09 RX ADMIN — ONDANSETRON 4 MG: 2 INJECTION INTRAMUSCULAR; INTRAVENOUS at 09:42

## 2024-09-09 RX ADMIN — DEXAMETHASONE SODIUM PHOSPHATE 4 MG: 4 INJECTION, SOLUTION INTRAMUSCULAR; INTRAVENOUS at 07:39

## 2024-09-09 RX ADMIN — FENTANYL CITRATE 25 MCG: 50 INJECTION, SOLUTION INTRAMUSCULAR; INTRAVENOUS at 08:44

## 2024-09-09 RX ADMIN — LIDOCAINE HYDROCHLORIDE 100 MG: 20 INJECTION, SOLUTION INFILTRATION; PERINEURAL at 07:31

## 2024-09-09 RX ADMIN — FAMOTIDINE 20 MG: 10 INJECTION INTRAVENOUS at 06:42

## 2024-09-09 RX ADMIN — SODIUM CHLORIDE, POTASSIUM CHLORIDE, SODIUM LACTATE AND CALCIUM CHLORIDE 9 ML/HR: 600; 310; 30; 20 INJECTION, SOLUTION INTRAVENOUS at 06:42

## 2024-09-09 RX ADMIN — GLYCOPYRROLATE 0.2 MG: 0.2 INJECTION INTRAMUSCULAR; INTRAVENOUS at 07:58

## 2024-09-09 RX ADMIN — SODIUM CHLORIDE, SODIUM LACTATE, POTASSIUM CHLORIDE, CALCIUM CHLORIDE: 600; 310; 30; 20 INJECTION, SOLUTION INTRAVENOUS at 07:24

## 2024-09-09 RX ADMIN — SODIUM CHLORIDE, POTASSIUM CHLORIDE, SODIUM LACTATE AND CALCIUM CHLORIDE: 600; 310; 30; 20 INJECTION, SOLUTION INTRAVENOUS at 07:24

## 2024-09-09 RX ADMIN — ROCURONIUM BROMIDE 50 MG: 10 INJECTION, SOLUTION INTRAVENOUS at 07:31

## 2024-09-09 RX ADMIN — ONDANSETRON 4 MG: 2 INJECTION INTRAMUSCULAR; INTRAVENOUS at 08:44

## 2024-09-09 NOTE — OP NOTE
Surgeon: Chong Ahuja Jr., M.D.    Assistant: BIJAN Hanna    An assistant was necessary and provided valuable retraction and exposure, as well as camera holding, instrument exchanges, needle and suture retrieval, and wound closure.  An assistant was crucial for the success of the case especially while I was at the console.    Pre-Operative Diagnosis:     Left inguinal hernia [K40.90]    Post-Operative Diagnosis:    Left inguinal hernia    Procedure Performed:     Da Manuel robot-assisted laparoscopic left inguinal hernia repair    Indications:     The patient is a pleasant 78-year-old male who is very active and developed pain in the left groin.  He had a CT scan of the abdomen and pelvis that showed a fat-containing left inguinal hernia.  He has noticed a bulge in the left groin at the site of pain.  He presents for da Manuel robot assisted laparoscopic left inguinal hernia repair, possible bilateral inguinal hernia repairs.  The patient understands the indications, alternatives, risks, and benefits of the procedure and wishes to proceed.     Procedure:     The patient was identified and taken to the operating room where he was placed in the supine position on the operating table.  Monitors were placed and he underwent general endotracheal anesthesia and was appropriately monitored throughout the case by the anesthesia personnel.  The abdomen was prepped and draped in the standard surgical fashion.  Each incision was infiltrated with 0.5% Marcaine with epinephrine prior to making the incision.  A supraumbilical incision was made and carried through the skin into the subcutaneous tissue.  The abdominal wall was elevated with skin hooks and a Veress needle was inserted through the incision into the abdomen without difficulty.  The abdomen was then insufflated with low pressures encountered initially.  Once fully insufflated, the Veress needle was removed and an 8 mm da Manuel port was placed in the same  incision, again with traction applied to the abdominal wall using skin hooks.  The laparoscope was inserted and the area of Veress needle and port insertion was inspected, no injury had occurred.  An 8 mm right mid abdominal port and left mid abdominal port were then placed by making a skin incision carried through the skin into the subcutaneous tissue and inserting the port through the incision into the abdomen with direct visualization intra-abdominal using laparoscope.  Attention was then turned to the pelvis.   There was a moderately sized left inguinal hernia but no right inguinal hernia.  The robot was then docked.  Attention was turned to the left lower abdomen.  The peritoneum was then incised from the anterior superior iliac spine all the way to the midline about 6 cm above the hernia defect.  The peritoneum was then reflected off the abdominal wall, first a laterally and then medially.  Along the medial aspect dissection was taken below Canelo's ligament to fully expose Canelo's ligament.  The hernia sac was then completely reduced.  A Bard 3D max mesh was then placed in the pocket and secured at Canelo's ligament with 2-0 Vicryl suture and then to the left and right of the epigastric artery with 2-0 Vicryl suture.  This provided excellent coverage of the hernia.  The peritoneum was then reapproximated with 2-0 barbed suture.  Hemostasis was noted be adequate.  The needles were removed from the abdomen.  The robot was undocked.  The ports were removed.  All skin incisions were closed with a 4-0 Monocryl in a subcuticular fashion followed by Mastisol and Steri-Strips.  The sponge, needle, and instrument counts were correct at the end of the case.  The patient tolerated the procedure well and was transferred to the recovery room in stable condition.     Estimated Blood Loss:      minimal    Specimens:     None    Chong Ahuja Jr., M.D.

## 2024-09-09 NOTE — ANESTHESIA POSTPROCEDURE EVALUATION
Patient: Chuck Zheng    Procedure Summary       Date: 09/09/24 Room / Location: [facility] OR [address] [place] [address] MAIN OR    Anesthesia Start: 0724 Anesthesia Stop: 0900    Procedure: Davinci assisted laparoscopic left inguinal hernia repair with mesh (Left: Abdomen) Diagnosis:       Left inguinal hernia      (Left inguinal hernia [K40.90])    Surgeons: Chong Ahuja Jr., MD Provider: Clayton Mathis MD    Anesthesia Type: general ASA Status: 3            Anesthesia Type: general    Vitals  Vitals Value Taken Time   /68 09/09/24 0915   Temp 36.4 °C (97.5 °F) 09/09/24 0856   Pulse 90 09/09/24 0930   Resp 14 09/09/24 0930   SpO2 97 % 09/09/24 0930           Post Anesthesia Care and Evaluation    Pain management: adequate    Airway patency: patent  Anesthetic complications: No anesthetic complications    Cardiovascular status: acceptable  Respiratory status: acceptable  Hydration status: acceptable    Comments: /68 (BP Location: Left arm, Patient Position: Sitting)   Pulse 90   Temp 36.4 °C (97.5 °F) (Oral)   Resp 16   SpO2 96%

## 2024-09-09 NOTE — ANESTHESIA PREPROCEDURE EVALUATION
Anesthesia Evaluation     Patient summary reviewed   NPO Solid Status: > 8 hours             Airway   Mallampati: II  Neck ROM: full  No difficulty expected  Dental      Pulmonary    (+) ,sleep apnea  Cardiovascular     ECG reviewed  Rhythm: regular    (+) hypertension, past MI  >12 months, CAD, cardiac stents       Neuro/Psych  GI/Hepatic/Renal/Endo      Musculoskeletal     Abdominal    Substance History      OB/GYN          Other   arthritis,                   Anesthesia Plan    ASA 3     general       Anesthetic plan, risks, benefits, and alternatives have been provided, discussed and informed consent has been obtained with: patient.

## 2024-09-09 NOTE — DISCHARGE INSTRUCTIONS
Dr. Chong Ahuja   [address]  [place], [place] [address] [phone]    Discharge Instructions for Hernia Surgery      Go home, rest and take it easy today; however, you should get up and move about several times today to reduce the risk of developing a clot in your legs.      You may experience some dizziness or memory loss from the anesthesia.  This may last for the next 24 hours.  Someone should plan on staying with you for the first 24 hours for your safety.    Do not make any important legal decisions or sign any legal papers for the next 24 hours.      Eat and drink lightly today.  Start off with liquids, jello, soup, crackers or other bland foods at first. You may advance your diet tomorrow as tolerated as long as you do not experience any nausea or vomiting.     You may remove your outer dressings in 2 days.  The white tapes called steri-strips should stay in place.  They will fall off on their own in 1-2 weeks.  Do not worry if they come off sooner.      You may notice some bleeding/drainage on your outer dressings. A little bloody drainage is normal. If the bleeding/drainage is such that the bandage cannot absorb it, remove the dressing, apply clean gauze and apply firm pressure for a full 15 minutes.  If the bleeding continues, please call me.    You may shower tomorrow.  No tub baths until your incisions are completely healed.     No lifting > 20 lbs. until you are seen at your follow-up visit.         You have received a prescription for a narcotic pain medicine, as you will have some pain following surgery.   You will not be totally pain free, but your pain medicine should make the pain tolerable.  Please take your pain medicine as prescribed and always take your pills with food to prevent nausea. If you are having severe pain that cannot be controlled by the pain medicine, please contact me.      If you had a laparoscopic surgery, it is not unusual to experience pain/discomfort in your  shoulders or under your ribs after surgery.  It is from the gas used during the laparoscopic procedure and usually lasts 1-3 days.  The prescription pain medicine is used to treat the surgical pain and does not typically alleviate this “gassy” pain.     No driving for 24 hours and for as long as you are taking your prescription pain medicine.      You will need to call the office at [phone] to schedule a follow-up appointment in 2 weeks.           Remember to contact me for any of the following:    Fever > 101 degrees  Severe pain that cannot be controlled by taking your pain pills  Severe nausea or vomiting   Significant bleeding of your incisions  Drainage that has a bad smell or is yellow or green in appearance  Any other questions or concerns        Additional Instruction for Inguinal Hernia Patients Only    If you did not urinate at the hospital after your surgery or if you feel the need to urinate and cannot, this will necessitate a return to the Emergency Room for placement of a urinary catheter.  You should also notify me as well.  As a rule, you should be able to empty your bladder within 4-6 hours after discharge from the hospital.      You may notice some scrotal bruising and/or swelling. A scrotal support or briefs as well as ice packs may be used to alleviate discomfort

## 2024-09-09 NOTE — ANESTHESIA PROCEDURE NOTES
Airway  Urgency: elective    Date/Time: 9/9/2024 7:35 AM  Airway not difficult    General Information and Staff    Patient location during procedure: OR    Indications and Patient Condition  Indications for airway management: airway protection    Preoxygenated: yes  Mask difficulty assessment: 2 - vent by mask + OA or adjuvant +/- NMBA    Final Airway Details  Final airway type: endotracheal airway      Successful airway: ETT  Cuffed: yes   Successful intubation technique: direct laryngoscopy  Facilitating devices/methods: intubating stylet  Endotracheal tube insertion site: oral  Blade: Isac  Blade size: 4  ETT size (mm): 7.5  Cormack-Lehane Classification: grade IIa - partial view of glottis  Placement verified by: chest auscultation and capnometry   Measured from: lips  ETT/EBT  to lips (cm): 23  Number of attempts at approach: 1  Assessment: lips, teeth, and gum same as pre-op and atraumatic intubation

## 2024-09-09 NOTE — H&P
Subjective  Chuck Zheng is a 78 y.o. male who presents for a left inguinal hernia.     History of present illness  The patient is a very active male who does a lot of hiking and was recently carrying heavy boxes for his wife and developed acute pain in the left lower abdomen.  The pain persisted to the point that he presented to the emergency room where a CT scan of the abdomen and pelvis showed a fat-containing left inguinal hernia.  He has not had any further pain since that ER visit which was on 5/10/2024.  He has noticed a small bulge in the left groin.  He is able to reduce it.  He has not had any change in his bowel or bladder function.  His only previous abdominal surgery is a laparoscopic cholecystectomy.     He has a history of coronary artery disease and has undergone stenting of the right coronary artery in 2007.  He routinely takes aspirin but no other anticoagulant.  He has no cardiac symptoms.     Review of Systems   Constitutional:  Negative for fatigue and fever.   Respiratory:  Negative for chest tightness and shortness of breath.    Cardiovascular:  Negative for chest pain and palpitations.   Gastrointestinal:  Negative for abdominal pain, blood in stool, constipation, diarrhea, nausea and vomiting.      Medical History        Past Medical History:   Diagnosis Date    Adhesive capsulitis of shoulder      Arthritis      Benign non-nodular prostatic hyperplasia 11/21/2016    Benign prostatic hypertrophy      Cholelithiasis 1995    Coronary artery disease      Functional diarrhea 09/13/2017     Resolved with gluten free diet    H/O cardiovascular stress test       7/14 normal    Hemorrhoid      History of COVID-19 11/2020    Hyperlipidemia      Hypertension      Myocardial infarction        05/2007, distal RCA 80% stenosis, s/p PTCA    Nephrolithiasis       left kidney    Obstructive sleep apnea       BIPAP    Primary insomnia 05/16/2016    Rupture of flexor tendon of hand      Syncope        12/2004 neg w/u    Tear of meniscus of knee 2015         Surgical History         Past Surgical History:   Procedure Laterality Date    CHOLECYSTECTOMY        COLONOSCOPY         06/2003, nl, , 02/2014 , nl, needs C-scope in 2024    CORONARY STENT PLACEMENT         5/2007 distal RCA    EYE SURGERY         JOEY CATARACTS W IOL    HAND SURGERY Right       THUMB SX    KNEE ARTHROSCOPY Right       2015    KNEE ARTHROSCOPY Left 01/15/2019     Procedure: LEFT KNEE ARTHROSCOPY,  PARTIAL MEDIAL MENISECTOMY;  Surgeon: Mason Ochoa MD;  [place]:  [facility];  Service: Orthopedics    TONSILLECTOMY        TOTAL KNEE ARTHROPLASTY Left 12/17/2021     Procedure: TOTAL KNEE ARTHROPLASTY;  Surgeon: Neal Goodwin MD;  [facility];  Service: Orthopedics;  Laterality: Left;               Family History   Problem Relation Age of Onset    Dementia Mother      Glaucoma Mother      Alcohol abuse Mother      Heart disease Father      Colon polyps Father      Hearing loss Father      Alcohol abuse Sister      Early death Sister      Early death Sister      Malig Hyperthermia Neg Hx        Social History   Social History            Socioeconomic History    Marital status:    Tobacco Use    Smoking status: Never    Smokeless tobacco: Never   Vaping Use    Vaping status: Never Used   Substance and Sexual Activity    Alcohol use: Yes       Alcohol/week: 6.0 standard drinks of alcohol       Types: 6 Glasses of wine per week       Comment: 7 drinks weekly    Drug use: No    Sexual activity: Not Currently       Partners: Female       Birth control/protection: None                  Objective  Physical Exam  Constitutional:       Appearance: Normal appearance. He is well-developed. He is not toxic-appearing.   Eyes:      General: No scleral icterus.  Pulmonary:      Effort: Pulmonary effort is normal. No respiratory distress.   Abdominal:      Palpations: Abdomen is soft.      Tenderness: There is no  abdominal tenderness.      Hernia: Hernia is present in the left inguinal area. There is no hernia in the right inguinal area.      Comments: There is a moderately sized left inguinal hernia that is reducible.  It contains fat.   Skin:     General: Skin is warm and dry.   Neurological:      Mental Status: He is alert and oriented to person, place, and time.   Psychiatric:         Behavior: Behavior normal.         Thought Content: Thought content normal.         Judgment: Judgment normal.                        Assessment & Plan  1.  Left inguinal hernia: The patient has a left inguinal hernia that is currently minimally symptomatic and contains fat.  The defect is large enough to permit the bowel but it is currently occupied by fat, likely the omentum.  This was discussed at length with the patient.  It was explained to him that the hernia will always be present if surgery is not performed as it does not heal spontaneously.  He is worried about general anesthesia and would like to avoid surgery if possible.  Approaches to inguinal hernia repair were discussed with him and it was explained that he is a very good candidate for a robotic left inguinal hernia repair.  Expectations for surgery and recovery were discussed with him.  He considered his options and contacted our office and is now scheduled for da Manuel robot assisted laparoscopic left inguinal hernia repair, possible bilateral inguinal hernia repairs.  The patient understands the indications, alternatives, risks, and benefits of the procedure and wishes to proceed.

## 2024-09-24 ENCOUNTER — OFFICE VISIT (OUTPATIENT)
Dept: SURGERY | Facility: CLINIC | Age: 79
End: 2024-09-24
Payer: MEDICARE

## 2024-09-24 VITALS
BODY MASS INDEX: 26.56 KG/M2 | DIASTOLIC BLOOD PRESSURE: 78 MMHG | SYSTOLIC BLOOD PRESSURE: 122 MMHG | HEIGHT: 67 IN | WEIGHT: 169.2 LBS

## 2024-09-24 DIAGNOSIS — Z48.89 POSTOPERATIVE VISIT: Primary | ICD-10-CM

## 2024-09-24 PROCEDURE — 99024 POSTOP FOLLOW-UP VISIT: CPT | Performed by: SURGERY

## 2024-09-24 PROCEDURE — 1159F MED LIST DOCD IN RCRD: CPT | Performed by: SURGERY

## 2024-09-24 PROCEDURE — 3074F SYST BP LT 130 MM HG: CPT | Performed by: SURGERY

## 2024-09-24 PROCEDURE — 1160F RVW MEDS BY RX/DR IN RCRD: CPT | Performed by: SURGERY

## 2024-09-24 PROCEDURE — 3078F DIAST BP <80 MM HG: CPT | Performed by: SURGERY

## 2024-11-15 ENCOUNTER — TRANSCRIBE ORDERS (OUTPATIENT)
Dept: ADMINISTRATIVE | Facility: HOSPITAL | Age: 79
End: 2024-11-15
Payer: MEDICARE

## 2024-11-15 DIAGNOSIS — R91.1 LUNG NODULE: Primary | ICD-10-CM

## 2024-12-12 ENCOUNTER — HOSPITAL ENCOUNTER (OUTPATIENT)
Dept: CT IMAGING | Facility: HOSPITAL | Age: 79
Discharge: HOME | End: 2024-12-12
Admitting: FAMILY MEDICINE
Payer: MEDICARE

## 2024-12-12 DIAGNOSIS — R91.1 LUNG NODULE: ICD-10-CM

## 2024-12-12 PROCEDURE — 25510000001 IOPAMIDOL 61 % SOLUTION: Performed by: FAMILY MEDICINE

## 2024-12-12 PROCEDURE — 71260 CT THORAX DX C+: CPT

## 2024-12-12 RX ORDER — IOPAMIDOL 612 MG/ML
100 INJECTION, SOLUTION INTRAVASCULAR
Status: COMPLETED | OUTPATIENT
Start: 2024-12-12 | End: 2024-12-12

## 2024-12-12 RX ADMIN — IOPAMIDOL 75 ML: 612 INJECTION, SOLUTION INTRAVENOUS at 15:54

## 2025-02-04 RX ORDER — MELOXICAM 15 MG/1
15 TABLET ORAL DAILY
Qty: 30 TABLET | Refills: 0 | Status: CANCELLED | OUTPATIENT
Start: 2025-02-04

## 2025-02-04 NOTE — TELEPHONE ENCOUNTER
LVM for patient that he no longer needs antibiotic for dental appts and he will need to get the meloxicam through his PCP since it has been awhile since we have seen him

## 2025-02-04 NOTE — TELEPHONE ENCOUNTER
Provider: JULIO    Caller: Chuck Zheng    Relationship to Patient: Self    Pharm[facility]: [facility]     Phone Number: [phone]    Reason for Call: PATIENT HAD A KNEE REPLACEMENT IN 2021. HE HAS A SEVERAL DENTAL APPTS COMING UP, THE 1ST ON 02/06/25, AND HE IS ASKING IF HE STILL NEEDS TO TAKE ANTIBIOTICS BEFORE HIS APPT. HE IS ALSO ASKING IF HE CAN GET A REFILL OF THE MELOXICAM THAT [facility] PRESCRIBED FOR HIM PREVIOUSLY, HE STATES HE IS GOING ON HIKING TRIP SOON. PLEASE LEAVE A VM IF HE DOESN'T ANSWER.

## 2025-02-05 ENCOUNTER — TRANSCRIBE ORDERS (OUTPATIENT)
Dept: ADMINISTRATIVE | Facility: HOSPITAL | Age: 80
End: 2025-02-05
Payer: MEDICARE

## 2025-02-05 DIAGNOSIS — R91.8 LUNG NODULES: Primary | ICD-10-CM

## 2025-04-22 ENCOUNTER — HOSPITAL ENCOUNTER (OUTPATIENT)
Dept: CT IMAGING | Facility: HOSPITAL | Age: 80
Discharge: HOME | End: 2025-04-22
Admitting: THORACIC SURGERY (CARDIOTHORACIC VASCULAR SURGERY)
Payer: MEDICARE

## 2025-04-22 DIAGNOSIS — R91.8 LUNG NODULES: ICD-10-CM

## 2025-04-22 PROCEDURE — 71250 CT THORAX DX C-: CPT

## 2025-05-21 NOTE — HOME HEALTH
Chronic condition since 4/2024.  Did complete physical therapy, also has had steroid injections in the past with some improvement of his symptoms for about a 3-week period.  Most recently has been seeing a chiropractor, but insurance stopped covering the soon.  Declines steroid injection at this time.  Continue with home exercise programs.  Patient prefers to delay orthopedic evaluation/possible surgery which I agree with for now.          "Subjective: \"I went to Dr Goodwin's office the other day and they took off the bandage over the drain hole as I was concerned it was still bleeding. LISA has stopped pumping and still intact.\"    Falls reported: none    Medication changes: none      Plan for next visit: Continue gait pattern training with rolling walker or cane if he obtains, HEP reinstruction with further upgrades as tolerated, and patient education as needed"

## 2025-08-04 ENCOUNTER — TRANSCRIBE ORDERS (OUTPATIENT)
Dept: ADMINISTRATIVE | Facility: HOSPITAL | Age: 80
End: 2025-08-04
Payer: MEDICARE

## 2025-08-04 DIAGNOSIS — R91.1 LUNG NODULE: Primary | ICD-10-CM

## (undated) DEVICE — GLV SURG SENSICARE PI PF LF 7 GRN STRL

## (undated) DEVICE — SYR LL TP 10ML STRL

## (undated) DEVICE — SUT ETHLN 4/0 PS2 PLSTC 1667G

## (undated) DEVICE — STERILE PATIENT PROTECTIVE PAD FOR IMP® KNEE POSITIONERS & COHESIVE WRAP (10 / CASE): Brand: DE MAYO KNEE POSITIONER®

## (undated) DEVICE — SUT VIC 0 CT1 36IN J946H

## (undated) DEVICE — SKIN PREP TRAY W/CHG: Brand: MEDLINE INDUSTRIES, INC.

## (undated) DEVICE — VIOLET BRAIDED (POLYGLACTIN 910), SYNTHETIC ABSORBABLE SUTURE: Brand: COATED VICRYL

## (undated) DEVICE — SYR LUERLOK 5CC

## (undated) DEVICE — BLD SHV DBL/CUT COOLCUT 4MM 13CM

## (undated) DEVICE — MAT FLR ABSORBENT LG 4FT 10 2.5FT

## (undated) DEVICE — TUBING, SUCTION, 1/4" X 20', STRAIGHT: Brand: MEDLINE INDUSTRIES, INC.

## (undated) DEVICE — LOU GENERAL ROBOT: Brand: MEDLINE INDUSTRIES, INC.

## (undated) DEVICE — ADHS LIQ MASTISOL 2/3ML

## (undated) DEVICE — THE STERILE LIGHT HANDLE COVER IS USED WITH STERIS SURGICAL LIGHTING AND VISUALIZATION SYSTEMS.

## (undated) DEVICE — BLADELESS OBTURATOR: Brand: WECK VISTA

## (undated) DEVICE — PK ARTHSCP 40

## (undated) DEVICE — ARM DRAPE

## (undated) DEVICE — COLUMN DRAPE

## (undated) DEVICE — DRAPE,REIN 53X77,STERILE: Brand: MEDLINE

## (undated) DEVICE — ENDOPATH PNEUMONEEDLE INSUFFLATION NEEDLES WITH LUER LOCK CONNECTORS 120MM: Brand: ENDOPATH

## (undated) DEVICE — SUT VIC 1 CT1 36IN J947H

## (undated) DEVICE — GLV SURG BIOGEL LTX PF 8

## (undated) DEVICE — DRSNG SURESITE WNDW 2.38X2.75

## (undated) DEVICE — GLV SURG SENSICARE W/ALOE PF LF 8 STRL

## (undated) DEVICE — UNDERCAST PADDING: Brand: DEROYAL

## (undated) DEVICE — STPLR SKIN VISISTAT WD 35CT

## (undated) DEVICE — GLV SURG PREMIERPRO ORTHO LTX PF SZ7.5 BRN

## (undated) DEVICE — SEAL

## (undated) DEVICE — T-DRAPE,EXTREMITY,STERILE: Brand: MEDLINE

## (undated) DEVICE — GLV SURG BIOGEL LTX PF 7 1/2

## (undated) DEVICE — PREMIUM WET SKIN PREP TRAY: Brand: MEDLINE INDUSTRIES, INC.

## (undated) DEVICE — TUBING SET, GRAVITY, 4-SPIKE

## (undated) DEVICE — SUT MNCRYL PLS ANTIB UD 4/0 PS2 18IN

## (undated) DEVICE — GLV SURG TRIUMPH CLASSIC PF LTX 7.5 STRL

## (undated) DEVICE — APPL DURAPREP IODOPHOR APL 26ML

## (undated) DEVICE — NEEDLE, QUINCKE 22GX3.5": Brand: MEDLINE INDUSTRIES, INC.

## (undated) DEVICE — SOL NACL 0.9PCT 100ML SGL

## (undated) DEVICE — GLV SURG SENSICARE PI MIC PF SZ7 LF STRL

## (undated) DEVICE — PAD,ABDOMINAL,8"X10",ST,LF: Brand: MEDLINE

## (undated) DEVICE — DRSNG WND GZ CURAD OIL EMULSION 3X3IN STRL

## (undated) DEVICE — STRIP,CLOSURE,WOUND,MEDI-STRIP,1/2X4: Brand: MEDLINE

## (undated) DEVICE — 3M™ IOBAN™ 2 ANTIMICROBIAL INCISE DRAPE 6640EZ: Brand: IOBAN™ 2

## (undated) DEVICE — SYS CLS SKIN PREMIERPRO EXOFINFUSION 22CM

## (undated) DEVICE — PENCL E/S ULTRAVAC TELESCP NOSE HOLSTR 10FT

## (undated) DEVICE — COVER,MAYO STAND,STERILE: Brand: MEDLINE

## (undated) DEVICE — TBG INSUFFLATION LUER LOCK: Brand: MEDLINE INDUSTRIES, INC.

## (undated) DEVICE — PATIENT RETURN ELECTRODE, SINGLE-USE, CONTACT QUALITY MONITORING, ADULT, WITH 9FT CORD, FOR PATIENTS WEIGING OVER 33LBS. (15KG): Brand: MEGADYNE

## (undated) DEVICE — DUAL CUT SAGITTAL BLADE

## (undated) DEVICE — PREP SOL POVIDONE/IODINE BT 4OZ

## (undated) DEVICE — SOL ANTISTICK CAUTRY ELECTROLUBE LF

## (undated) DEVICE — ABL ASP APOLLO RF XL 90D

## (undated) DEVICE — MEDI-VAC YANKAUER SUCTION HANDLE W/BULBOUS TIP: Brand: CARDINAL HEALTH

## (undated) DEVICE — KT DRN EVAC WND PVC PCH WTROC RND 10F400

## (undated) DEVICE — TRAP FLD MINIVAC MEGADYNE 100ML

## (undated) DEVICE — TIP COVER ACCESSORY

## (undated) DEVICE — BNDG ELAS ELITE V/CLOSE 6IN 5YD LF STRL

## (undated) DEVICE — PK KN TOTL 40